# Patient Record
Sex: FEMALE | Race: BLACK OR AFRICAN AMERICAN | Employment: UNEMPLOYED | URBAN - METROPOLITAN AREA
[De-identification: names, ages, dates, MRNs, and addresses within clinical notes are randomized per-mention and may not be internally consistent; named-entity substitution may affect disease eponyms.]

---

## 2017-02-20 ENCOUNTER — OFFICE VISIT (OUTPATIENT)
Dept: OBGYN CLINIC | Facility: CLINIC | Age: 37
End: 2017-02-20

## 2017-02-20 VITALS
WEIGHT: 125 LBS | HEART RATE: 74 BPM | SYSTOLIC BLOOD PRESSURE: 110 MMHG | HEIGHT: 61.5 IN | DIASTOLIC BLOOD PRESSURE: 68 MMHG | BODY MASS INDEX: 23.3 KG/M2

## 2017-02-20 DIAGNOSIS — Z01.419 ENCOUNTER FOR GYNECOLOGICAL EXAMINATION WITHOUT ABNORMAL FINDING: Primary | ICD-10-CM

## 2017-02-20 DIAGNOSIS — Z12.39 BREAST CANCER SCREENING: ICD-10-CM

## 2017-02-20 DIAGNOSIS — Z12.4 CERVICAL CANCER SCREENING: ICD-10-CM

## 2017-02-20 PROCEDURE — 99385 PREV VISIT NEW AGE 18-39: CPT | Performed by: OBSTETRICS & GYNECOLOGY

## 2017-02-20 PROCEDURE — 87624 HPV HI-RISK TYP POOLED RSLT: CPT | Performed by: OBSTETRICS & GYNECOLOGY

## 2017-02-20 PROCEDURE — 88175 CYTOPATH C/V AUTO FLUID REDO: CPT | Performed by: OBSTETRICS & GYNECOLOGY

## 2017-02-20 NOTE — PROGRESS NOTES
GYN H&P     2017  2:29 PM    CC: Patient presents with:  Physical: irregular menses since stopping pill/ would like pregnancy      HPI: patient is a 43year old  here for her annual gyne exam. Pt is new to our office. Moved from [place].  [facility] wheezing. Cardiovascular: Negative for chest pain, palpitations and leg swelling. Gastrointestinal: Negative for nausea, vomiting, abdominal pain, diarrhea, blood in stool   Genitourinary: Negative for dysuria, hematuria and difficulty urinating.    mAy Ramírez Ekta MARIE

## 2017-02-21 LAB — HPV I/H RISK 1 DNA SPEC QL NAA+PROBE: NEGATIVE

## 2017-06-08 ENCOUNTER — OFFICE VISIT (OUTPATIENT)
Dept: OBGYN CLINIC | Facility: CLINIC | Age: 37
End: 2017-06-08

## 2017-06-08 VITALS
WEIGHT: 129 LBS | HEIGHT: 62.5 IN | HEART RATE: 92 BPM | DIASTOLIC BLOOD PRESSURE: 80 MMHG | SYSTOLIC BLOOD PRESSURE: 124 MMHG | BODY MASS INDEX: 23.14 KG/M2

## 2017-06-08 DIAGNOSIS — O09.521 AMA (ADVANCED MATERNAL AGE) MULTIGRAVIDA 35+, FIRST TRIMESTER: Primary | ICD-10-CM

## 2017-06-08 PROBLEM — O09.529 AMA (ADVANCED MATERNAL AGE) MULTIGRAVIDA 35+: Status: ACTIVE | Noted: 2017-06-08

## 2017-06-08 PROBLEM — O09.529 AMA (ADVANCED MATERNAL AGE) MULTIGRAVIDA 35+ (HCC): Status: ACTIVE | Noted: 2017-06-08

## 2017-06-08 PROCEDURE — 87086 URINE CULTURE/COLONY COUNT: CPT | Performed by: OBSTETRICS & GYNECOLOGY

## 2017-06-08 RX ORDER — PRENATAL VIT/IRON FUM/FOLIC AC 27MG-0.8MG
1 TABLET ORAL DAILY
COMMUNITY
End: 2018-08-01 | Stop reason: ALTCHOICE

## 2017-06-08 NOTE — PROGRESS NOTES
NOB  Stopped OC in December, irregular menses   x1  PMH: diverticulitis  PSH: none  Normal PAP/HPV , declined [facility]  Scan: SIUP, 7 w,   Cardiac activity seen  Prenatal care and course discussed with patient including ultrasounds, genetic testing opt

## 2017-06-21 ENCOUNTER — LAB ENCOUNTER (OUTPATIENT)
Dept: LAB | Age: 37
End: 2017-06-21
Attending: OBSTETRICS & GYNECOLOGY
Payer: COMMERCIAL

## 2017-06-21 DIAGNOSIS — O09.521 AMA (ADVANCED MATERNAL AGE) MULTIGRAVIDA 35+, FIRST TRIMESTER: ICD-10-CM

## 2017-06-21 PROCEDURE — 86901 BLOOD TYPING SEROLOGIC RH(D): CPT | Performed by: OBSTETRICS & GYNECOLOGY

## 2017-06-21 PROCEDURE — 86850 RBC ANTIBODY SCREEN: CPT | Performed by: OBSTETRICS & GYNECOLOGY

## 2017-06-21 PROCEDURE — 36415 COLL VENOUS BLD VENIPUNCTURE: CPT | Performed by: OBSTETRICS & GYNECOLOGY

## 2017-06-21 PROCEDURE — 87340 HEPATITIS B SURFACE AG IA: CPT | Performed by: OBSTETRICS & GYNECOLOGY

## 2017-06-21 PROCEDURE — 86762 RUBELLA ANTIBODY: CPT | Performed by: OBSTETRICS & GYNECOLOGY

## 2017-06-21 PROCEDURE — 85025 COMPLETE CBC W/AUTO DIFF WBC: CPT | Performed by: OBSTETRICS & GYNECOLOGY

## 2017-06-21 PROCEDURE — 86780 TREPONEMA PALLIDUM: CPT | Performed by: OBSTETRICS & GYNECOLOGY

## 2017-06-21 PROCEDURE — 87389 HIV-1 AG W/HIV-1&-2 AB AG IA: CPT | Performed by: OBSTETRICS & GYNECOLOGY

## 2017-06-21 PROCEDURE — 86900 BLOOD TYPING SEROLOGIC ABO: CPT | Performed by: OBSTETRICS & GYNECOLOGY

## 2017-06-28 ENCOUNTER — TELEPHONE (OUTPATIENT)
Dept: OBGYN CLINIC | Facility: CLINIC | Age: 37
End: 2017-06-28

## 2017-07-06 ENCOUNTER — OFFICE VISIT (OUTPATIENT)
Dept: OBGYN CLINIC | Facility: CLINIC | Age: 37
End: 2017-07-06

## 2017-07-06 VITALS
WEIGHT: 133 LBS | SYSTOLIC BLOOD PRESSURE: 112 MMHG | HEIGHT: 62.5 IN | BODY MASS INDEX: 23.86 KG/M2 | DIASTOLIC BLOOD PRESSURE: 66 MMHG

## 2017-07-06 DIAGNOSIS — O09.91 HIGH-RISK PREGNANCY, FIRST TRIMESTER: Primary | ICD-10-CM

## 2017-07-06 DIAGNOSIS — O09.521 AMA (ADVANCED MATERNAL AGE) MULTIGRAVIDA 35+, FIRST TRIMESTER: ICD-10-CM

## 2017-07-12 ENCOUNTER — MED REC SCAN ONLY (OUTPATIENT)
Dept: OBGYN CLINIC | Facility: CLINIC | Age: 37
End: 2017-07-12

## 2017-07-12 ENCOUNTER — TELEPHONE (OUTPATIENT)
Dept: OBGYN CLINIC | Facility: CLINIC | Age: 37
End: 2017-07-12

## 2017-07-31 ENCOUNTER — OFFICE VISIT (OUTPATIENT)
Dept: OBGYN CLINIC | Facility: CLINIC | Age: 37
End: 2017-07-31

## 2017-07-31 VITALS
BODY MASS INDEX: 24.4 KG/M2 | WEIGHT: 136 LBS | DIASTOLIC BLOOD PRESSURE: 56 MMHG | HEIGHT: 62.5 IN | SYSTOLIC BLOOD PRESSURE: 104 MMHG

## 2017-07-31 DIAGNOSIS — O09.92 HIGH-RISK PREGNANCY, SECOND TRIMESTER: ICD-10-CM

## 2017-07-31 DIAGNOSIS — Z3A.15 15 WEEKS GESTATION OF PREGNANCY: Primary | ICD-10-CM

## 2017-07-31 DIAGNOSIS — O09.522 AMA (ADVANCED MATERNAL AGE) MULTIGRAVIDA 35+, SECOND TRIMESTER: ICD-10-CM

## 2017-07-31 NOTE — PATIENT INSTRUCTIONS
Medications Safe in Pregnancy  The following over-the-counter medications may be taken safely after 12 weeks gestation by any patient who is pregnant. Please follow the instructions on the package for adult dosage.   If you experience any symptoms benjamín

## 2017-07-31 NOTE — PROGRESS NOTES
ROSETTE  Doing well  No complaints.  No LOF/VB/uctx  RH +  Genetic testing low risk cf dna (first, quad/AFP)  Anatomy Scan to have level 2--order entered (18-20weeks)  rtc 4 wk

## 2017-08-29 ENCOUNTER — OFFICE VISIT (OUTPATIENT)
Dept: OBGYN CLINIC | Facility: CLINIC | Age: 37
End: 2017-08-29

## 2017-08-29 VITALS
WEIGHT: 140 LBS | SYSTOLIC BLOOD PRESSURE: 112 MMHG | DIASTOLIC BLOOD PRESSURE: 68 MMHG | HEIGHT: 62.5 IN | BODY MASS INDEX: 25.12 KG/M2

## 2017-08-29 DIAGNOSIS — O09.92 HIGH-RISK PREGNANCY, SECOND TRIMESTER: ICD-10-CM

## 2017-08-29 DIAGNOSIS — O09.522 AMA (ADVANCED MATERNAL AGE) MULTIGRAVIDA 35+, SECOND TRIMESTER: Primary | ICD-10-CM

## 2017-09-06 NOTE — PROGRESS NOTES
Outpatient Maternal-Fetal Medicine Consultation    Dear Dr. Michael Torres    Thank you for requesting ultrasound evaluation and maternal fetal medicine consultation on your patient Cayden Subramanian.   As you are aware she is a 43year old female  with a ____________________________________________________________________________  Dating:  LMP: 04/17/2017 EDC: 01/22/2018 GA by LMP: 20w3d  Current Scan on: 09/07/2017 EDC: 01/26/2018 GA by current scan: 19w6d  The calculation of the gestational age by curr mm.  ____________________________________________________________________________    I interpreted the results and reviewed them with the patient.     DISCUSSION  During her visit we discussed and reviewed the following issues:  ADVANCED MATERNAL AGE    Chandu Hernandez non-invasive pregnancy testing (NIPT) offers the highest detection rate (with the lowest false positive rate) for the detection of fetal aneuploidy amongst high-risk patients.   The limitations of detailed mid-trimester sonography was reviewed with the zander

## 2017-09-07 ENCOUNTER — OFFICE VISIT (OUTPATIENT)
Dept: PERINATAL CARE | Facility: HOSPITAL | Age: 37
End: 2017-09-07
Attending: OBSTETRICS & GYNECOLOGY
Payer: COMMERCIAL

## 2017-09-07 VITALS
HEART RATE: 100 BPM | BODY MASS INDEX: 25.76 KG/M2 | DIASTOLIC BLOOD PRESSURE: 44 MMHG | WEIGHT: 140 LBS | HEIGHT: 62 IN | SYSTOLIC BLOOD PRESSURE: 107 MMHG

## 2017-09-07 DIAGNOSIS — O09.522 ELDERLY MULTIGRAVIDA IN SECOND TRIMESTER: Primary | ICD-10-CM

## 2017-09-07 PROCEDURE — 99243 OFF/OP CNSLTJ NEW/EST LOW 30: CPT

## 2017-09-07 PROCEDURE — 76811 OB US DETAILED SNGL FETUS: CPT

## 2017-09-11 ENCOUNTER — TELEPHONE (OUTPATIENT)
Dept: OBGYN CLINIC | Facility: CLINIC | Age: 37
End: 2017-09-11

## 2017-09-11 NOTE — TELEPHONE ENCOUNTER
Returned patient's call. She reports having a cold with congestion; sore throat has resolved. Has some pain that started today on the right side of her face. Reviewed medications and congestion relief methods safe in pregnancy.  Questions answered and patie

## 2017-09-14 ENCOUNTER — HOSPITAL ENCOUNTER (OUTPATIENT)
Age: 37
Discharge: HOME | End: 2017-09-14
Attending: FAMILY MEDICINE
Payer: COMMERCIAL

## 2017-09-14 VITALS
HEIGHT: 61 IN | BODY MASS INDEX: 26.43 KG/M2 | TEMPERATURE: 99 F | DIASTOLIC BLOOD PRESSURE: 72 MMHG | RESPIRATION RATE: 18 BRPM | WEIGHT: 140 LBS | SYSTOLIC BLOOD PRESSURE: 127 MMHG | HEART RATE: 112 BPM | OXYGEN SATURATION: 100 %

## 2017-09-14 DIAGNOSIS — J06.9 VIRAL UPPER RESPIRATORY TRACT INFECTION: Primary | ICD-10-CM

## 2017-09-14 PROCEDURE — 99202 OFFICE O/P NEW SF 15 MIN: CPT

## 2017-09-14 PROCEDURE — 99212 OFFICE O/P EST SF 10 MIN: CPT

## 2017-09-14 NOTE — TELEPHONE ENCOUNTER
Pt stating she now is coughing up dark green mucous  I asked if pt followed up with PCP as directed by nurse on 9/11, pt stating she doesn't have a PCP as she just moved to area.   Pt requesting nurse call back to discuss her options

## 2017-09-14 NOTE — ED INITIAL ASSESSMENT (HPI)
Pt c/o cough x 1 week, productive with green Phlegm today, nasal congestion and runny nose. Pt denies sore throat, fever or chills. Denies any SOB when asked but states she does get a little SOB after coughing fits. Pt is 5 months gestation .   Per pt,

## 2017-09-14 NOTE — TELEPHONE ENCOUNTER
Pt reports continued respiratory congestion; productive cough; green sputum; denies fever or chest pain. Pt does not have PCP. Advised pt to go to walk in care. Patient verbalized understanding.

## 2017-09-14 NOTE — ED PROVIDER NOTES
Patient Seen in: [facility]    History   Patient presents with:  Cough/URI    Stated Complaint: COUGH, CONGESTION, SOB x1 week    HPI    Patient is a 58-year-old female.   Coming in today with complaint of cough, congestion noted in HPI. Constitutional and vital signs reviewed. All other systems reviewed and negative except as noted above. PSFH elements reviewed from today and agreed except as otherwise stated in HPI.     Physical Exam   ED Triage Vitals [09/14/17 144 as needed for control cold symptoms. Symptomatic treatment at this time. If she starts running fevers, or if no symptom improvement, she may return for reevaluation.       Disposition and Plan     Clinical Impression:  Viral upper respiratory tract infect

## 2017-09-26 ENCOUNTER — OFFICE VISIT (OUTPATIENT)
Dept: OBGYN CLINIC | Facility: CLINIC | Age: 37
End: 2017-09-26

## 2017-09-26 VITALS
BODY MASS INDEX: 25.76 KG/M2 | HEIGHT: 62 IN | DIASTOLIC BLOOD PRESSURE: 60 MMHG | SYSTOLIC BLOOD PRESSURE: 106 MMHG | WEIGHT: 140 LBS

## 2017-09-26 DIAGNOSIS — O09.522 AMA (ADVANCED MATERNAL AGE) MULTIGRAVIDA 35+, SECOND TRIMESTER: ICD-10-CM

## 2017-09-26 DIAGNOSIS — O09.92 HIGH-RISK PREGNANCY, SECOND TRIMESTER: Primary | ICD-10-CM

## 2017-09-30 ENCOUNTER — LAB ENCOUNTER (OUTPATIENT)
Dept: LAB | Age: 37
End: 2017-09-30
Attending: OBSTETRICS & GYNECOLOGY
Payer: COMMERCIAL

## 2017-09-30 DIAGNOSIS — O09.92 HIGH-RISK PREGNANCY, SECOND TRIMESTER: ICD-10-CM

## 2017-09-30 PROCEDURE — 36415 COLL VENOUS BLD VENIPUNCTURE: CPT

## 2017-09-30 PROCEDURE — 85025 COMPLETE CBC W/AUTO DIFF WBC: CPT

## 2017-09-30 PROCEDURE — 82950 GLUCOSE TEST: CPT

## 2017-10-02 NOTE — PROGRESS NOTES
Contacted patient and reported results. Instructed patient on iron supplement. She states understanding and has no questions at this time.

## 2017-10-26 ENCOUNTER — OFFICE VISIT (OUTPATIENT)
Dept: OBGYN CLINIC | Facility: CLINIC | Age: 37
End: 2017-10-26

## 2017-10-26 VITALS
HEIGHT: 62 IN | DIASTOLIC BLOOD PRESSURE: 70 MMHG | SYSTOLIC BLOOD PRESSURE: 100 MMHG | WEIGHT: 143 LBS | BODY MASS INDEX: 26.31 KG/M2

## 2017-10-26 DIAGNOSIS — O09.522 AMA (ADVANCED MATERNAL AGE) MULTIGRAVIDA 35+, SECOND TRIMESTER: Primary | ICD-10-CM

## 2017-10-26 NOTE — PROGRESS NOTES
ROSETTE  Doing well  Good FM, discussed fetal movement  RH positive  1 hr glucose/ CB normal  TDAP recommended during pregnancy and instructions given  RTC 2 weeks

## 2017-11-15 ENCOUNTER — OFFICE VISIT (OUTPATIENT)
Dept: OBGYN CLINIC | Facility: CLINIC | Age: 37
End: 2017-11-15

## 2017-11-15 VITALS
SYSTOLIC BLOOD PRESSURE: 104 MMHG | HEIGHT: 62 IN | BODY MASS INDEX: 26.68 KG/M2 | WEIGHT: 145 LBS | DIASTOLIC BLOOD PRESSURE: 54 MMHG

## 2017-11-15 DIAGNOSIS — O09.523 AMA (ADVANCED MATERNAL AGE) MULTIGRAVIDA 35+, THIRD TRIMESTER: Primary | ICD-10-CM

## 2017-11-15 DIAGNOSIS — Z23 NEED FOR VACCINATION: ICD-10-CM

## 2017-11-15 DIAGNOSIS — Z34.83 PRENATAL CARE, SUBSEQUENT PREGNANCY, THIRD TRIMESTER: ICD-10-CM

## 2017-11-15 PROCEDURE — 90686 IIV4 VACC NO PRSV 0.5 ML IM: CPT | Performed by: OBSTETRICS & GYNECOLOGY

## 2017-11-15 PROCEDURE — 90471 IMMUNIZATION ADMIN: CPT | Performed by: OBSTETRICS & GYNECOLOGY

## 2017-11-15 PROCEDURE — 90715 TDAP VACCINE 7 YRS/> IM: CPT | Performed by: OBSTETRICS & GYNECOLOGY

## 2017-11-15 PROCEDURE — 90472 IMMUNIZATION ADMIN EACH ADD: CPT | Performed by: OBSTETRICS & GYNECOLOGY

## 2017-11-15 NOTE — PROGRESS NOTES
ROSETTE  Doing well, +FM  Denies VB/LOF/uctx  Rh pos, TDAP and flu today  Growth US at 32 wks  NST at 36 wks for AMA  RTC in 2 wks  Fetal movement instructions given

## 2017-11-29 NOTE — PROGRESS NOTES
Keke Swenson    Dear Dr. Don Cuevas    Thank you for requesting ultrasound evaluation and maternal fetal medicine consultation on your patient Perez Pineda.   As you are aware she is a 43year old female  with bladder. Gastrointestinal Tract: stomach visible. Summary of Ultrasound Findings:  Impression:  The fetal growth is appropriate    ____________________________________________________________________________  Fetal Wellbeing Assessment:  Amniotic flui

## 2017-11-30 ENCOUNTER — OFFICE VISIT (OUTPATIENT)
Dept: OBGYN CLINIC | Facility: CLINIC | Age: 37
End: 2017-11-30

## 2017-11-30 ENCOUNTER — OFFICE VISIT (OUTPATIENT)
Dept: PERINATAL CARE | Facility: HOSPITAL | Age: 37
End: 2017-11-30
Attending: OBSTETRICS & GYNECOLOGY
Payer: COMMERCIAL

## 2017-11-30 VITALS — BODY MASS INDEX: 26 KG/M2 | SYSTOLIC BLOOD PRESSURE: 110 MMHG | WEIGHT: 144 LBS | DIASTOLIC BLOOD PRESSURE: 58 MMHG

## 2017-11-30 VITALS
WEIGHT: 144 LBS | HEART RATE: 108 BPM | SYSTOLIC BLOOD PRESSURE: 106 MMHG | DIASTOLIC BLOOD PRESSURE: 64 MMHG | BODY MASS INDEX: 26 KG/M2

## 2017-11-30 DIAGNOSIS — O09.523 ELDERLY MULTIGRAVIDA IN THIRD TRIMESTER: ICD-10-CM

## 2017-11-30 DIAGNOSIS — O09.523 AMA (ADVANCED MATERNAL AGE) MULTIGRAVIDA 35+, THIRD TRIMESTER: Primary | ICD-10-CM

## 2017-11-30 DIAGNOSIS — Z34.83 PRENATAL CARE, SUBSEQUENT PREGNANCY, THIRD TRIMESTER: ICD-10-CM

## 2017-11-30 PROCEDURE — 99213 OFFICE O/P EST LOW 20 MIN: CPT | Performed by: OBSTETRICS & GYNECOLOGY

## 2017-11-30 PROCEDURE — 76819 FETAL BIOPHYS PROFIL W/O NST: CPT | Performed by: OBSTETRICS & GYNECOLOGY

## 2017-11-30 PROCEDURE — 76805 OB US >/= 14 WKS SNGL FETUS: CPT | Performed by: OBSTETRICS & GYNECOLOGY

## 2017-11-30 NOTE — PROGRESS NOTES
ROSETTE  Doing well, +FM  Denies VB/LOF/uctx  Rh pos, TDAP received  RTC in 2 wks  Fetal movement instructions given  Growth US w/ MFM today  Thinking about PP tubal ligation, discussed at 6 weeks to be performed if she desires

## 2017-12-14 ENCOUNTER — OFFICE VISIT (OUTPATIENT)
Dept: OBGYN CLINIC | Facility: CLINIC | Age: 37
End: 2017-12-14

## 2017-12-14 VITALS
HEIGHT: 62 IN | BODY MASS INDEX: 27.67 KG/M2 | DIASTOLIC BLOOD PRESSURE: 60 MMHG | SYSTOLIC BLOOD PRESSURE: 100 MMHG | WEIGHT: 150.38 LBS

## 2017-12-14 DIAGNOSIS — O09.523 ELDERLY MULTIGRAVIDA IN THIRD TRIMESTER: Primary | ICD-10-CM

## 2017-12-14 DIAGNOSIS — Z34.93 ENCOUNTER FOR SUPERVISION OF NORMAL PREGNANCY IN THIRD TRIMESTER, UNSPECIFIED GRAVIDITY: ICD-10-CM

## 2017-12-14 NOTE — PROGRESS NOTES
ROSETTE  Doing well, +FM  Denies LOF/VB/uctx  Rh positive, TDAP and flu vaccine received  AMA: cffDNA low risk, s/p MFM Level 2 anatomy scan and growth scan with unremarkable findings, NST weekly at 36 wks     RTC in 1 wks    PTL and Fetal movement instruction

## 2017-12-22 ENCOUNTER — OFFICE VISIT (OUTPATIENT)
Dept: OBGYN CLINIC | Facility: CLINIC | Age: 37
End: 2017-12-22

## 2017-12-22 VITALS
HEIGHT: 62 IN | SYSTOLIC BLOOD PRESSURE: 110 MMHG | BODY MASS INDEX: 27.42 KG/M2 | WEIGHT: 149 LBS | DIASTOLIC BLOOD PRESSURE: 68 MMHG

## 2017-12-22 DIAGNOSIS — O09.523 ELDERLY MULTIGRAVIDA IN THIRD TRIMESTER: Primary | ICD-10-CM

## 2017-12-22 PROCEDURE — 87653 STREP B DNA AMP PROBE: CPT | Performed by: OBSTETRICS & GYNECOLOGY

## 2017-12-22 PROCEDURE — 87081 CULTURE SCREEN ONLY: CPT | Performed by: OBSTETRICS & GYNECOLOGY

## 2017-12-22 NOTE — PROGRESS NOTES
ROSETTE  Doing well, +FM  Denies VB/LOF/uctx  Mode of delivery:   anticipated  SVE cl/th/hi   GBS collected (35-37)  RTC 1 week  NST next week

## 2017-12-29 ENCOUNTER — OFFICE VISIT (OUTPATIENT)
Dept: OBGYN CLINIC | Facility: CLINIC | Age: 37
End: 2017-12-29

## 2017-12-29 ENCOUNTER — APPOINTMENT (OUTPATIENT)
Dept: OBGYN CLINIC | Facility: CLINIC | Age: 37
End: 2017-12-29

## 2017-12-29 VITALS — BODY MASS INDEX: 27 KG/M2 | DIASTOLIC BLOOD PRESSURE: 64 MMHG | SYSTOLIC BLOOD PRESSURE: 104 MMHG | WEIGHT: 150 LBS

## 2017-12-29 DIAGNOSIS — Z34.83 PRENATAL CARE, SUBSEQUENT PREGNANCY, THIRD TRIMESTER: ICD-10-CM

## 2017-12-29 DIAGNOSIS — Z3A.36 36 WEEKS GESTATION OF PREGNANCY: Primary | ICD-10-CM

## 2017-12-29 DIAGNOSIS — O09.523 ELDERLY MULTIGRAVIDA IN THIRD TRIMESTER: ICD-10-CM

## 2017-12-29 PROCEDURE — 59025 FETAL NON-STRESS TEST: CPT | Performed by: OBSTETRICS & GYNECOLOGY

## 2017-12-29 NOTE — PROGRESS NOTES
ROSETTE  Doing well, +FM  Denies VB/LOF/uctx  Mode of delivery:   anticipated  SVE cl/80/-1   GBS collected (35-37)POS  RTC 1 week  NST reactive

## 2018-01-04 ENCOUNTER — OFFICE VISIT (OUTPATIENT)
Dept: OBGYN CLINIC | Facility: CLINIC | Age: 38
End: 2018-01-04

## 2018-01-04 ENCOUNTER — APPOINTMENT (OUTPATIENT)
Dept: OBGYN CLINIC | Facility: CLINIC | Age: 38
End: 2018-01-04

## 2018-01-04 VITALS
HEIGHT: 62 IN | BODY MASS INDEX: 27.79 KG/M2 | DIASTOLIC BLOOD PRESSURE: 64 MMHG | SYSTOLIC BLOOD PRESSURE: 100 MMHG | WEIGHT: 151 LBS

## 2018-01-04 DIAGNOSIS — O09.523 AMA (ADVANCED MATERNAL AGE) MULTIGRAVIDA 35+, THIRD TRIMESTER: Primary | ICD-10-CM

## 2018-01-04 PROCEDURE — 59025 FETAL NON-STRESS TEST: CPT | Performed by: OBSTETRICS & GYNECOLOGY

## 2018-01-04 NOTE — PROGRESS NOTES
ROSETTE  Doing well, +FM  Denies VB/LOF/uctx  Mode of delivery:   anticipated.  IOL 17 at 715  SVE /-2 vtx   GBS pos, abx in labor  RTC 1 week  NST reactive

## 2018-01-08 ENCOUNTER — HOSPITAL ENCOUNTER (INPATIENT)
Facility: HOSPITAL | Age: 38
LOS: 2 days | Discharge: HOME | End: 2018-01-10
Attending: OBSTETRICS & GYNECOLOGY | Admitting: OBSTETRICS & GYNECOLOGY
Payer: COMMERCIAL

## 2018-01-08 PROBLEM — Z34.90 PREGNANCY (HCC): Status: ACTIVE | Noted: 2018-01-08

## 2018-01-08 PROBLEM — Z34.90 PREGNANCY: Status: ACTIVE | Noted: 2018-01-08

## 2018-01-08 LAB
BILIRUBIN URINE: NEGATIVE
BLOOD URINE: NEGATIVE
CONTROL RUN WITHIN 24 HOURS?: YES
GLUCOSE URINE: NEGATIVE
KETONE URINE: NEGATIVE
NITRITE URINE: NEGATIVE
PH URINE: 7 (ref 5–8)
PROTEIN URINE: NEGATIVE
SPEC GRAVITY: 1.01 (ref 1–1.03)
URINE COLOR: COLORLESS
UROBILINOGEN URINE: 0.2

## 2018-01-08 RX ORDER — AMPICILLIN 1 G/1
1 INJECTION, POWDER, FOR SOLUTION INTRAMUSCULAR; INTRAVENOUS EVERY 4 HOURS
Status: DISCONTINUED | OUTPATIENT
Start: 2018-01-09 | End: 2018-01-09 | Stop reason: HOSPADM

## 2018-01-08 RX ORDER — TERBUTALINE SULFATE 1 MG/ML
0.25 INJECTION, SOLUTION SUBCUTANEOUS AS NEEDED
Status: DISCONTINUED | OUTPATIENT
Start: 2018-01-08 | End: 2018-01-09 | Stop reason: HOSPADM

## 2018-01-08 RX ORDER — IBUPROFEN 600 MG/1
600 TABLET ORAL ONCE AS NEEDED
Status: DISCONTINUED | OUTPATIENT
Start: 2018-01-08 | End: 2018-01-09 | Stop reason: HOSPADM

## 2018-01-08 RX ORDER — SODIUM CHLORIDE, SODIUM LACTATE, POTASSIUM CHLORIDE, CALCIUM CHLORIDE 600; 310; 30; 20 MG/100ML; MG/100ML; MG/100ML; MG/100ML
INJECTION, SOLUTION INTRAVENOUS CONTINUOUS
Status: DISCONTINUED | OUTPATIENT
Start: 2018-01-09 | End: 2018-01-09 | Stop reason: HOSPADM

## 2018-01-08 RX ORDER — DEXTROSE, SODIUM CHLORIDE, SODIUM LACTATE, POTASSIUM CHLORIDE, AND CALCIUM CHLORIDE 5; .6; .31; .03; .02 G/100ML; G/100ML; G/100ML; G/100ML; G/100ML
INJECTION, SOLUTION INTRAVENOUS AS NEEDED
Status: DISCONTINUED | OUTPATIENT
Start: 2018-01-08 | End: 2018-01-09 | Stop reason: HOSPADM

## 2018-01-08 RX ORDER — TRISODIUM CITRATE DIHYDRATE AND CITRIC ACID MONOHYDRATE 500; 334 MG/5ML; MG/5ML
30 SOLUTION ORAL AS NEEDED
Status: DISCONTINUED | OUTPATIENT
Start: 2018-01-08 | End: 2018-01-09 | Stop reason: HOSPADM

## 2018-01-08 RX ORDER — MELATONIN
325
COMMUNITY
End: 2018-02-07

## 2018-01-08 RX ORDER — NALBUPHINE HCL 10 MG/ML
2.5 AMPUL (ML) INJECTION
Status: DISCONTINUED | OUTPATIENT
Start: 2018-01-08 | End: 2018-01-10

## 2018-01-08 RX ORDER — EPHEDRINE SULFATE 50 MG/ML
5 INJECTION, SOLUTION INTRAVENOUS AS NEEDED
Status: DISCONTINUED | OUTPATIENT
Start: 2018-01-08 | End: 2018-01-09

## 2018-01-09 LAB
ANTIBODY SCREEN: NEGATIVE
ERYTHROCYTE [DISTWIDTH] IN BLOOD BY AUTOMATED COUNT: 14.5 % (ref 11.5–16)
HCT VFR BLD AUTO: 32.7 % (ref 34–50)
HGB BLD-MCNC: 11.1 G/DL (ref 12–16)
MCH RBC QN AUTO: 30 PG (ref 27–33.2)
MCHC RBC AUTO-ENTMCNC: 33.9 G/DL (ref 31–37)
MCV RBC AUTO: 88.4 FL (ref 81–100)
PLATELET # BLD AUTO: 164 10(3)UL (ref 150–450)
RAPID HIV: NONREACTIVE
RBC # BLD AUTO: 3.7 X10(6)UL (ref 3.8–5.1)
RED CELL DISTRIBUTION WIDTH-SD: 46.5 FL (ref 35.1–46.3)
RH BLOOD TYPE: POSITIVE
T PALLIDUM AB SER QL IA: NONREACTIVE
WBC # BLD AUTO: 9.7 X10(3) UL (ref 4–13)

## 2018-01-09 PROCEDURE — 59400 OBSTETRICAL CARE: CPT | Performed by: OBSTETRICS & GYNECOLOGY

## 2018-01-09 RX ORDER — SIMETHICONE 80 MG
80 TABLET,CHEWABLE ORAL 3 TIMES DAILY PRN
Status: DISCONTINUED | OUTPATIENT
Start: 2018-01-09 | End: 2018-01-10

## 2018-01-09 RX ORDER — DOCUSATE SODIUM 100 MG/1
100 CAPSULE, LIQUID FILLED ORAL
Status: DISCONTINUED | OUTPATIENT
Start: 2018-01-09 | End: 2018-01-10

## 2018-01-09 RX ORDER — HYDROCODONE BITARTRATE AND ACETAMINOPHEN 5; 325 MG/1; MG/1
2 TABLET ORAL EVERY 4 HOURS PRN
Status: DISCONTINUED | OUTPATIENT
Start: 2018-01-09 | End: 2018-01-10

## 2018-01-09 RX ORDER — ACETAMINOPHEN 325 MG/1
650 TABLET ORAL EVERY 4 HOURS PRN
Status: DISCONTINUED | OUTPATIENT
Start: 2018-01-09 | End: 2018-01-10

## 2018-01-09 RX ORDER — IBUPROFEN 600 MG/1
600 TABLET ORAL EVERY 6 HOURS
Status: DISCONTINUED | OUTPATIENT
Start: 2018-01-09 | End: 2018-01-10

## 2018-01-09 RX ORDER — HYDROCODONE BITARTRATE AND ACETAMINOPHEN 5; 325 MG/1; MG/1
1 TABLET ORAL EVERY 4 HOURS PRN
Status: DISCONTINUED | OUTPATIENT
Start: 2018-01-09 | End: 2018-01-10

## 2018-01-09 RX ORDER — BISACODYL 10 MG
10 SUPPOSITORY, RECTAL RECTAL ONCE AS NEEDED
Status: ACTIVE | OUTPATIENT
Start: 2018-01-09 | End: 2018-01-09

## 2018-01-09 RX ORDER — ZOLPIDEM TARTRATE 5 MG/1
5 TABLET ORAL NIGHTLY PRN
Status: DISCONTINUED | OUTPATIENT
Start: 2018-01-09 | End: 2018-01-10

## 2018-01-09 NOTE — PROGRESS NOTES
Report received from [facility]. Patient received in stable condition.  Will continue to monitor

## 2018-01-09 NOTE — L&D DELIVERY NOTE
Letitia Dozier, Girl  [[id number]]     Labor Events     labor?:  No    steroids?:  None   Antibiotics received during labor?:  Yes   Antibiotics (enter # doses in comment):  ampicillin   Rupture date:  18   Rupture time:  711   Rupture type: Scoring Key:     0 1 2    Skin color Blue or pale Acrocyanotic Completely pink    Heart rate Absent <100 bpm >100 bpm    Reflex irritability No response Grimace Cry or active withdrawal    Muscle tone Limp Some flexion Active motion    Respiratory effort A sampled and/or banked. IV pitocin and gentle uterine massage helped delivery of the placenta. Umbilical cord gases were not sent. Placenta delivered spontaneosly by uterine massage.    Rectal-vaginal exam was normal.     Bleeding was minimal.  The patient

## 2018-01-09 NOTE — PROGRESS NOTES
Pt  EDC 18 presents to L&D with C/O uc's every 5mins since apx . Pt denies vag bleeding or leaking of fluid. Pt states + fetal movement. Urine spec obtained, EFM tested and applied.  ID bands verified per pt and this RN

## 2018-01-09 NOTE — H&P
Lavell Storm Patient Status:  Inpatient    1980 MRN [id number]   Location [facility] Attending Patti Samuels MD   Hosp Day # 1 PCP None Pcp     Subjective:  Leigh Fried gbbs    Assessment/Plan:    IUP at 38 1/7 weeks  Labor, admitted, had cervical change, then had some spacing of contractions.   She was offered dc to home, requested to stay  Augmentation started         Risks, benefits, alternatives and possible complicati

## 2018-01-09 NOTE — PROGRESS NOTES
Pt transferred via ambulation. POC discussed. Consents explained, pt verb understanding and agreeable to POC,consents signed. Report given to Pavan Montgomery.

## 2018-01-10 VITALS
DIASTOLIC BLOOD PRESSURE: 66 MMHG | OXYGEN SATURATION: 93 % | TEMPERATURE: 98 F | RESPIRATION RATE: 17 BRPM | SYSTOLIC BLOOD PRESSURE: 123 MMHG | BODY MASS INDEX: 28.51 KG/M2 | WEIGHT: 151 LBS | HEART RATE: 75 BPM | HEIGHT: 61 IN

## 2018-01-10 PROBLEM — Z34.90 PREGNANCY: Status: RESOLVED | Noted: 2018-01-08 | Resolved: 2018-01-10

## 2018-01-10 PROBLEM — Z34.90 PREGNANCY (HCC): Status: RESOLVED | Noted: 2018-01-08 | Resolved: 2018-01-10

## 2018-01-10 LAB
BASOPHILS # BLD AUTO: 0.06 X10(3) UL (ref 0–0.1)
BASOPHILS NFR BLD AUTO: 0.5 %
EOSINOPHIL # BLD AUTO: 0.17 X10(3) UL (ref 0–0.3)
EOSINOPHIL NFR BLD AUTO: 1.5 %
ERYTHROCYTE [DISTWIDTH] IN BLOOD BY AUTOMATED COUNT: 14.6 % (ref 11.5–16)
HCT VFR BLD AUTO: 32.4 % (ref 34–50)
HGB BLD-MCNC: 10.8 G/DL (ref 12–16)
IMMATURE GRANULOCYTE COUNT: 0.05 X10(3) UL (ref 0–1)
IMMATURE GRANULOCYTE RATIO %: 0.4 %
LYMPHOCYTES # BLD AUTO: 2.16 X10(3) UL (ref 0.9–4)
LYMPHOCYTES NFR BLD AUTO: 18.8 %
MCH RBC QN AUTO: 29.5 PG (ref 27–33.2)
MCHC RBC AUTO-ENTMCNC: 33.3 G/DL (ref 31–37)
MCV RBC AUTO: 88.5 FL (ref 81–100)
MONOCYTES # BLD AUTO: 0.72 X10(3) UL (ref 0.1–0.6)
MONOCYTES NFR BLD AUTO: 6.3 %
NEUTROPHIL ABS PRELIM: 8.31 X10 (3) UL (ref 1.3–6.7)
NEUTROPHILS # BLD AUTO: 8.31 X10(3) UL (ref 1.3–6.7)
NEUTROPHILS NFR BLD AUTO: 72.5 %
PLATELET # BLD AUTO: 145 10(3)UL (ref 150–450)
RBC # BLD AUTO: 3.66 X10(6)UL (ref 3.8–5.1)
RED CELL DISTRIBUTION WIDTH-SD: 47.2 FL (ref 35.1–46.3)
WBC # BLD AUTO: 11.5 X10(3) UL (ref 4–13)

## 2018-01-10 NOTE — PROGRESS NOTES
PPD#1  No C/O  Afebrile, VS stable  Minimal bleeding  Check Hg: pending  Stable, possible home today

## 2018-01-11 NOTE — PROGRESS NOTES
NURSING DISCHARGE NOTE    Discharged Home via Wheelchair. Accompanied by Support staff  Belongings Taken by patient/family. Discharge instructions reviewed, pt states understanding.

## 2018-01-14 ENCOUNTER — TELEPHONE (OUTPATIENT)
Dept: OBGYN UNIT | Facility: HOSPITAL | Age: 38
End: 2018-01-14

## 2018-01-22 ENCOUNTER — TELEPHONE (OUTPATIENT)
Dept: OBGYN CLINIC | Facility: CLINIC | Age: 38
End: 2018-01-22

## 2018-02-07 ENCOUNTER — OFFICE VISIT (OUTPATIENT)
Dept: OBGYN CLINIC | Facility: CLINIC | Age: 38
End: 2018-02-07

## 2018-02-07 VITALS
DIASTOLIC BLOOD PRESSURE: 70 MMHG | HEIGHT: 61 IN | WEIGHT: 126 LBS | BODY MASS INDEX: 23.79 KG/M2 | SYSTOLIC BLOOD PRESSURE: 104 MMHG | HEART RATE: 102 BPM

## 2018-02-07 RX ORDER — ACETAMINOPHEN AND CODEINE PHOSPHATE 120; 12 MG/5ML; MG/5ML
0.35 SOLUTION ORAL DAILY
Qty: 28 TABLET | Refills: 3 | Status: SHIPPED | OUTPATIENT
Start: 2018-02-07 | End: 2018-04-02

## 2018-02-07 NOTE — PROGRESS NOTES
GYNE postpartum note     S: patient is a 42year old yo   female who presents today for post partum visit. She underwent  on 18 . She is doing well, breast feeding. Has no complaints.  Bleeding is minimal now   Denies any depressed mood, anx

## 2018-02-15 ENCOUNTER — TELEPHONE (OUTPATIENT)
Dept: OBGYN CLINIC | Facility: CLINIC | Age: 38
End: 2018-02-15

## 2018-04-02 ENCOUNTER — TELEPHONE (OUTPATIENT)
Dept: OBGYN CLINIC | Facility: CLINIC | Age: 38
End: 2018-04-02

## 2018-04-02 RX ORDER — ACETAMINOPHEN AND CODEINE PHOSPHATE 120; 12 MG/5ML; MG/5ML
0.35 SOLUTION ORAL DAILY
Qty: 3 PACKAGE | Refills: 0 | Status: SHIPPED | OUTPATIENT
Start: 2018-04-02 | End: 2018-06-05

## 2018-04-02 NOTE — TELEPHONE ENCOUNTER
Received fax request from [facility] for 90-day supply rx for Marielos-Be, per pt's insurance. Patient has appt for annual on 5/17/18. 1 90-day supply sent.

## 2018-06-05 ENCOUNTER — APPOINTMENT (OUTPATIENT)
Dept: LAB | Age: 38
End: 2018-06-05
Attending: NURSE PRACTITIONER
Payer: COMMERCIAL

## 2018-06-05 ENCOUNTER — OFFICE VISIT (OUTPATIENT)
Dept: OBGYN CLINIC | Facility: CLINIC | Age: 38
End: 2018-06-05

## 2018-06-05 ENCOUNTER — TELEPHONE (OUTPATIENT)
Dept: OBGYN CLINIC | Facility: CLINIC | Age: 38
End: 2018-06-05

## 2018-06-05 VITALS
WEIGHT: 129 LBS | DIASTOLIC BLOOD PRESSURE: 68 MMHG | BODY MASS INDEX: 23.74 KG/M2 | SYSTOLIC BLOOD PRESSURE: 118 MMHG | HEIGHT: 61.75 IN

## 2018-06-05 DIAGNOSIS — Z30.41 SURVEILLANCE FOR BIRTH CONTROL, ORAL CONTRACEPTIVES: ICD-10-CM

## 2018-06-05 DIAGNOSIS — Z01.419 WELL WOMAN EXAM WITH ROUTINE GYNECOLOGICAL EXAM: Primary | ICD-10-CM

## 2018-06-05 PROBLEM — Z34.93 ENCOUNTER FOR SUPERVISION OF NORMAL PREGNANCY IN THIRD TRIMESTER (HCC): Status: RESOLVED | Noted: 2017-12-14 | Resolved: 2018-06-05

## 2018-06-05 PROBLEM — O09.523 ELDERLY MULTIGRAVIDA IN THIRD TRIMESTER: Status: RESOLVED | Noted: 2017-06-08 | Resolved: 2018-06-05

## 2018-06-05 PROBLEM — O09.523 ELDERLY MULTIGRAVIDA IN THIRD TRIMESTER (HCC): Status: RESOLVED | Noted: 2017-06-08 | Resolved: 2018-06-05

## 2018-06-05 PROBLEM — Z34.93 ENCOUNTER FOR SUPERVISION OF NORMAL PREGNANCY IN THIRD TRIMESTER: Status: RESOLVED | Noted: 2017-12-14 | Resolved: 2018-06-05

## 2018-06-05 PROCEDURE — 99395 PREV VISIT EST AGE 18-39: CPT | Performed by: NURSE PRACTITIONER

## 2018-06-05 RX ORDER — ACETAMINOPHEN AND CODEINE PHOSPHATE 120; 12 MG/5ML; MG/5ML
0.35 SOLUTION ORAL DAILY
Qty: 3 PACKAGE | Refills: 3 | Status: SHIPPED | OUTPATIENT
Start: 2018-06-05 | End: 2018-08-06 | Stop reason: ALTCHOICE

## 2018-06-05 NOTE — PROGRESS NOTES
Here for Routine Annual Exam  No concerns or questions. No menses since childbirth, still nursing. Contraception- taking the mini pill, would like a salpingectomy. Has a family history of ovarian cancer, wants permanent sterilization.   No C/O    ROS:

## 2018-06-06 NOTE — TELEPHONE ENCOUNTER
Patient called, she saw Chas Bautista yesterday and she forgot to ask her for a note for work that she was at the [address] [place] [address] yesterday.   Please fax it to pt - fax #  [phone]

## 2018-06-06 NOTE — TELEPHONE ENCOUNTER
Patient returned call. She needs a note stating that she was seen in the office yesterday. Letter faxed to [phone]    She also would like to proceed with scheduling tubal with Dr. Colleen Mccarthy.  Patient was counseled in February 2018 at her postpartum vi

## 2018-06-06 NOTE — TELEPHONE ENCOUNTER
Patient seen in office by Tylor Scott on 6/5/18 for an annual.     Call to patient to see if she needs note for work as proof that she was seen for her annual or if it is an excuse from work letter that she is requesting. No answer and VM box is full.

## 2018-06-25 NOTE — TELEPHONE ENCOUNTER
Patient cancelled her pre-op appointment and wants to talk to a nurse to discuss her appointment. Wants to know if it will be an exam or just a conversation. Please call her back to discuss.

## 2018-06-26 NOTE — TELEPHONE ENCOUNTER
S/p  18; pp appt 18; annual 18. Pt still nursing; taking Donnydonelson Tobar Be. Pt desires tubal or salpingectomy  Pt had to cancel pre op appt due to  issues.   Pt would like to know if appt with MD is just discussion or exam so she can plan

## 2018-07-17 ENCOUNTER — HOSPITAL ENCOUNTER (EMERGENCY)
Facility: HOSPITAL | Age: 38
Discharge: HOME | End: 2018-07-18
Attending: EMERGENCY MEDICINE | Admitting: SURGERY
Payer: COMMERCIAL

## 2018-07-17 DIAGNOSIS — K81.0 ACUTE CHOLECYSTITIS: Primary | ICD-10-CM

## 2018-07-17 DIAGNOSIS — K81.9 CHOLECYSTITIS: ICD-10-CM

## 2018-07-18 ENCOUNTER — APPOINTMENT (OUTPATIENT)
Dept: GENERAL RADIOLOGY | Facility: HOSPITAL | Age: 38
End: 2018-07-18
Attending: SURGERY
Payer: COMMERCIAL

## 2018-07-18 ENCOUNTER — ANESTHESIA (OUTPATIENT)
Dept: SURGERY | Facility: HOSPITAL | Age: 38
End: 2018-07-18

## 2018-07-18 ENCOUNTER — ANESTHESIA EVENT (OUTPATIENT)
Dept: SURGERY | Facility: HOSPITAL | Age: 38
End: 2018-07-18

## 2018-07-18 ENCOUNTER — APPOINTMENT (OUTPATIENT)
Dept: CT IMAGING | Age: 38
End: 2018-07-18
Attending: EMERGENCY MEDICINE
Payer: COMMERCIAL

## 2018-07-18 VITALS
BODY MASS INDEX: 24.55 KG/M2 | HEIGHT: 61 IN | HEART RATE: 86 BPM | SYSTOLIC BLOOD PRESSURE: 121 MMHG | RESPIRATION RATE: 16 BRPM | OXYGEN SATURATION: 100 % | WEIGHT: 130 LBS | TEMPERATURE: 98 F | DIASTOLIC BLOOD PRESSURE: 92 MMHG

## 2018-07-18 PROBLEM — K81.0 ACUTE CHOLECYSTITIS: Status: ACTIVE | Noted: 2018-07-18

## 2018-07-18 LAB
ALBUMIN SERPL-MCNC: 3.9 G/DL (ref 3.5–4.8)
ALP LIVER SERPL-CCNC: 88 U/L (ref 37–98)
ALT SERPL-CCNC: 30 U/L (ref 14–54)
AST SERPL-CCNC: 36 U/L (ref 15–41)
BASOPHILS # BLD AUTO: 0.08 X10(3) UL (ref 0–0.1)
BASOPHILS NFR BLD AUTO: 1.2 %
BILIRUB SERPL-MCNC: 0.2 MG/DL (ref 0.1–2)
BILIRUB UR QL STRIP.AUTO: NEGATIVE
BUN BLD-MCNC: 24 MG/DL (ref 8–20)
CALCIUM BLD-MCNC: 9.4 MG/DL (ref 8.3–10.3)
CHLORIDE: 107 MMOL/L (ref 101–111)
CLARITY UR REFRACT.AUTO: CLEAR
CO2: 22 MMOL/L (ref 22–32)
COLOR UR AUTO: YELLOW
CREAT BLD-MCNC: 1.14 MG/DL (ref 0.55–1.02)
EOSINOPHIL # BLD AUTO: 0.21 X10(3) UL (ref 0–0.3)
EOSINOPHIL NFR BLD AUTO: 3.1 %
ERYTHROCYTE [DISTWIDTH] IN BLOOD BY AUTOMATED COUNT: 13.3 % (ref 11.5–16)
GLUCOSE BLD-MCNC: 125 MG/DL (ref 70–99)
GLUCOSE UR STRIP.AUTO-MCNC: NEGATIVE MG/DL
HCT VFR BLD AUTO: 38 % (ref 34–50)
HGB BLD-MCNC: 12.8 G/DL (ref 12–16)
IMMATURE GRANULOCYTE COUNT: 0.01 X10(3) UL (ref 0–1)
IMMATURE GRANULOCYTE RATIO %: 0.1 %
KETONES UR STRIP.AUTO-MCNC: NEGATIVE MG/DL
LEUKOCYTE ESTERASE UR QL STRIP.AUTO: NEGATIVE
LIPASE: 273 U/L (ref 73–393)
LYMPHOCYTES # BLD AUTO: 3.08 X10(3) UL (ref 0.9–4)
LYMPHOCYTES NFR BLD AUTO: 45.3 %
M PROTEIN MFR SERPL ELPH: 7.5 G/DL (ref 6.1–8.3)
MCH RBC QN AUTO: 28.7 PG (ref 27–33.2)
MCHC RBC AUTO-ENTMCNC: 33.7 G/DL (ref 31–37)
MCV RBC AUTO: 85.2 FL (ref 81–100)
MONOCYTES # BLD AUTO: 0.66 X10(3) UL (ref 0.1–1)
MONOCYTES NFR BLD AUTO: 9.7 %
NEUTROPHIL ABS PRELIM: 2.76 X10 (3) UL (ref 1.3–6.7)
NEUTROPHILS # BLD AUTO: 2.76 X10(3) UL (ref 1.3–6.7)
NEUTROPHILS NFR BLD AUTO: 40.6 %
NITRITE UR QL STRIP.AUTO: NEGATIVE
PH UR STRIP.AUTO: 8 [PH] (ref 4.5–8)
PLATELET # BLD AUTO: 226 10(3)UL (ref 150–450)
POCT LOT NUMBER: NORMAL
POCT URINE PREGNANCY: NEGATIVE
POTASSIUM SERPL-SCNC: 3.7 MMOL/L (ref 3.6–5.1)
PROT UR STRIP.AUTO-MCNC: NEGATIVE MG/DL
RBC # BLD AUTO: 4.46 X10(6)UL (ref 3.8–5.1)
RBC UR QL AUTO: NEGATIVE
RED CELL DISTRIBUTION WIDTH-SD: 41.6 FL (ref 35.1–46.3)
SODIUM SERPL-SCNC: 138 MMOL/L (ref 136–144)
SP GR UR STRIP.AUTO: 1.01 (ref 1–1.03)
UROBILINOGEN UR STRIP.AUTO-MCNC: 0.2 MG/DL
WBC # BLD AUTO: 6.8 X10(3) UL (ref 4–13)

## 2018-07-18 PROCEDURE — 74300 X-RAY BILE DUCTS/PANCREAS: CPT | Performed by: SURGERY

## 2018-07-18 PROCEDURE — 99243 OFF/OP CNSLTJ NEW/EST LOW 30: CPT | Performed by: SURGERY

## 2018-07-18 PROCEDURE — 0FT44ZZ RESECTION OF GALLBLADDER, PERCUTANEOUS ENDOSCOPIC APPROACH: ICD-10-PCS | Performed by: SURGERY

## 2018-07-18 PROCEDURE — BF10YZZ FLUOROSCOPY OF BILE DUCTS USING OTHER CONTRAST: ICD-10-PCS | Performed by: SURGERY

## 2018-07-18 PROCEDURE — 74177 CT ABD & PELVIS W/CONTRAST: CPT | Performed by: EMERGENCY MEDICINE

## 2018-07-18 PROCEDURE — 47563 LAPARO CHOLECYSTECTOMY/GRAPH: CPT | Performed by: SURGERY

## 2018-07-18 RX ORDER — DIPHENHYDRAMINE HYDROCHLORIDE 50 MG/ML
12.5 INJECTION INTRAMUSCULAR; INTRAVENOUS AS NEEDED
Status: DISCONTINUED | OUTPATIENT
Start: 2018-07-18 | End: 2018-07-18

## 2018-07-18 RX ORDER — HYDROCODONE BITARTRATE AND ACETAMINOPHEN 5; 325 MG/1; MG/1
2 TABLET ORAL AS NEEDED
Status: COMPLETED | OUTPATIENT
Start: 2018-07-18 | End: 2018-07-18

## 2018-07-18 RX ORDER — ONDANSETRON 2 MG/ML
4 INJECTION INTRAMUSCULAR; INTRAVENOUS ONCE
Status: COMPLETED | OUTPATIENT
Start: 2018-07-18 | End: 2018-07-18

## 2018-07-18 RX ORDER — SODIUM CHLORIDE 9 MG/ML
INJECTION, SOLUTION INTRAVENOUS CONTINUOUS
Status: CANCELLED | OUTPATIENT
Start: 2018-07-18 | End: 2018-07-18

## 2018-07-18 RX ORDER — MEPERIDINE HYDROCHLORIDE 25 MG/ML
12.5 INJECTION INTRAMUSCULAR; INTRAVENOUS; SUBCUTANEOUS AS NEEDED
Status: DISCONTINUED | OUTPATIENT
Start: 2018-07-18 | End: 2018-07-18

## 2018-07-18 RX ORDER — HYDROMORPHONE HYDROCHLORIDE 1 MG/ML
1 INJECTION, SOLUTION INTRAMUSCULAR; INTRAVENOUS; SUBCUTANEOUS EVERY 30 MIN PRN
Status: DISCONTINUED | OUTPATIENT
Start: 2018-07-18 | End: 2018-07-18

## 2018-07-18 RX ORDER — HYDROMORPHONE HYDROCHLORIDE 1 MG/ML
0.5 INJECTION, SOLUTION INTRAMUSCULAR; INTRAVENOUS; SUBCUTANEOUS EVERY 30 MIN PRN
Status: CANCELLED | OUTPATIENT
Start: 2018-07-18 | End: 2018-07-18

## 2018-07-18 RX ORDER — SODIUM CHLORIDE, SODIUM LACTATE, POTASSIUM CHLORIDE, CALCIUM CHLORIDE 600; 310; 30; 20 MG/100ML; MG/100ML; MG/100ML; MG/100ML
INJECTION, SOLUTION INTRAVENOUS CONTINUOUS
Status: DISCONTINUED | OUTPATIENT
Start: 2018-07-18 | End: 2018-07-18

## 2018-07-18 RX ORDER — NALOXONE HYDROCHLORIDE 0.4 MG/ML
80 INJECTION, SOLUTION INTRAMUSCULAR; INTRAVENOUS; SUBCUTANEOUS AS NEEDED
Status: DISCONTINUED | OUTPATIENT
Start: 2018-07-18 | End: 2018-07-18

## 2018-07-18 RX ORDER — MIDAZOLAM HYDROCHLORIDE 1 MG/ML
1 INJECTION INTRAMUSCULAR; INTRAVENOUS EVERY 5 MIN PRN
Status: DISCONTINUED | OUTPATIENT
Start: 2018-07-18 | End: 2018-07-18

## 2018-07-18 RX ORDER — MORPHINE SULFATE 4 MG/ML
2 INJECTION, SOLUTION INTRAMUSCULAR; INTRAVENOUS EVERY 5 MIN PRN
Status: DISCONTINUED | OUTPATIENT
Start: 2018-07-18 | End: 2018-07-18

## 2018-07-18 RX ORDER — HYDROCODONE BITARTRATE AND ACETAMINOPHEN 5; 325 MG/1; MG/1
1 TABLET ORAL EVERY 6 HOURS PRN
Qty: 20 TABLET | Refills: 0 | Status: SHIPPED | OUTPATIENT
Start: 2018-07-18 | End: 2018-08-01 | Stop reason: ALTCHOICE

## 2018-07-18 RX ORDER — KETOROLAC TROMETHAMINE 30 MG/ML
30 INJECTION, SOLUTION INTRAMUSCULAR; INTRAVENOUS ONCE
Status: COMPLETED | OUTPATIENT
Start: 2018-07-18 | End: 2018-07-18

## 2018-07-18 RX ORDER — BUPIVACAINE HYDROCHLORIDE AND EPINEPHRINE 5; 5 MG/ML; UG/ML
INJECTION, SOLUTION EPIDURAL; INTRACAUDAL; PERINEURAL AS NEEDED
Status: DISCONTINUED | OUTPATIENT
Start: 2018-07-18 | End: 2018-07-18 | Stop reason: HOSPADM

## 2018-07-18 RX ORDER — HYDROCODONE BITARTRATE AND ACETAMINOPHEN 5; 325 MG/1; MG/1
1 TABLET ORAL AS NEEDED
Status: COMPLETED | OUTPATIENT
Start: 2018-07-18 | End: 2018-07-18

## 2018-07-18 RX ORDER — ONDANSETRON 2 MG/ML
4 INJECTION INTRAMUSCULAR; INTRAVENOUS EVERY 4 HOURS PRN
Status: CANCELLED | OUTPATIENT
Start: 2018-07-18

## 2018-07-18 RX ORDER — ONDANSETRON 2 MG/ML
4 INJECTION INTRAMUSCULAR; INTRAVENOUS AS NEEDED
Status: DISCONTINUED | OUTPATIENT
Start: 2018-07-18 | End: 2018-07-18

## 2018-07-18 NOTE — OPERATIVE REPORT
[facility]   Operative Note    Beth Santiago Location: OR   CSN [id number] MRN [id number]   Admission Date 7/17/2018 Operation Date 7/18/2018   Attending Physician Nannette Tiwari MD Operating Physician Rica Cheema MD     Date of procedure injury, conversion to an open procedure, possible need for a drain, possible recurrence or persistence of symptoms despite surgery, postoperative diarrhea, possible need for further treatment or intervention pending cholangiogram results.   These and other dissection was kept above the line of Rouviere and a critical view was obtained. The cystic duct was then clipped once proximally on the specimen side. A ductotomy was made. A cholangiocatheter was introduced through the lateral 5 mm port.    The cholang patient did tolerate the procedure well. The patient was taken to the recovery room in stable condition. Complications:  None    EBL:   Minimal     Pathologic specimens:  The gallbladder and its contents    Remy Montalvo MD

## 2018-07-18 NOTE — BRIEF OP NOTE
Pre-Operative Diagnosis: Cholecystitis [K81.9], cholelithiasis     Post-Operative Diagnosis: CHOLECYSTITIS AND CHOLELITHIASIS      Procedure Performed:   Procedure(s):  LAPAROSCOPIC CHOLECYSTECTOMY WITH INTRAOPERATIVE CHOLANGIOGRAM    Surgeon(s) and Role:

## 2018-07-18 NOTE — ED NOTES
Patient states that Toradol did not help with pain. Patient given additional pain medication. Awaiting CT.

## 2018-07-18 NOTE — ED PROVIDER NOTES
Patient Seen in: THE [facility] Emergency Department In [place]    History   Patient presents with:  Nausea/Vomiting/Diarrhea (gastrointestinal)    Stated Complaint: abdomen and wraps around to back nausea    HPI    Patient developed pain in the right upper q No CVA tenderness.   Extremities: No peripheral edema or evidence of DVT       ED Course     Labs Reviewed   COMP METABOLIC PANEL (14) - Abnormal; Notable for the following:        Result Value    Glucose 125 (*)     BUN 24 (*)     Creatinine 1.14 (*)     A

## 2018-07-18 NOTE — ANESTHESIA PREPROCEDURE EVALUATION
PRE-OP EVALUATION    Patient Name: Sergei Uriarte    Pre-op Diagnosis: Cholecystitis [K81.9]    Procedure(s):  LAPAROSCOPIC CHOLECYSTECTOMY WITH INTRAOPERATIVE CHOLANGIOGRAM    Surgeon(s) and Role:     Waqar Rosales MD - Primary    Pre-op kelsie 07/18/2018   HGB 12.8 07/18/2018   HCT 38.0 07/18/2018   MCV 85.2 07/18/2018   MCH 28.7 07/18/2018   MCHC 33.7 07/18/2018   RDW 13.3 07/18/2018   .0 07/18/2018       Lab Results  Component Value Date    07/18/2018   K 3.7 07/18/2018    0

## 2018-07-18 NOTE — CONSULTS
[facility]  Report of Consultation    Ariadne Vargas Patient Status:  Emergency    1980 MRN [id number]   Location [address] [place] [address] Attending Eduardo Christopher MD   Hosp Day # 0 PCP None Pcp     Date of consu Nasal discharge  Eyes:  Negative for eye discharge, visual disturbance   Cardiovascular:  Negative for cool extremity and irregular heartbeat/palpitations  Respiratory:  Negative for cough, dyspnea and wheezing, SOB  Gastrointestinal: Positive abdominal pa GFRAA  70   GFRNAA  61   CA  9.4   ALB  3.9   NA  138   K  3.7   CL  107   CO2  22.0   ALKPHO  88   AST  36   ALT  30   BILT  0.2   TP  7.5         Assessment/Plan:     the patient is a 66-year-old female who is 6 months postpartum   She presents to the

## 2018-07-18 NOTE — ANESTHESIA POSTPROCEDURE EVALUATION
[address] [facility] Patient Status:  Emergency   Age/Gender 42year old female MRN [id number]   [facility] SURGERY Attending Carolynn Pierre MD   Hosp Day # 0 PCP None Pcp       Anesthesia Post-op Note    Procedure(s):

## 2018-08-01 ENCOUNTER — OFFICE VISIT (OUTPATIENT)
Dept: SURGERY | Facility: CLINIC | Age: 38
End: 2018-08-01

## 2018-08-01 VITALS
DIASTOLIC BLOOD PRESSURE: 68 MMHG | RESPIRATION RATE: 16 BRPM | HEIGHT: 61 IN | BODY MASS INDEX: 23.6 KG/M2 | HEART RATE: 82 BPM | WEIGHT: 125 LBS | SYSTOLIC BLOOD PRESSURE: 101 MMHG

## 2018-08-01 DIAGNOSIS — K81.0 ACUTE CHOLECYSTITIS: Primary | ICD-10-CM

## 2018-08-01 PROCEDURE — 99024 POSTOP FOLLOW-UP VISIT: CPT | Performed by: PHYSICIAN ASSISTANT

## 2018-08-01 NOTE — PROGRESS NOTES
Follow Up Visit Note       Active Problems      No diagnosis found. Chief Complaint   Patient presents with:  Post-Op: post-op 7.18 lap rodri RICHTER        History of Present Illness        Allergies  Jeromy Bush has No Known Allergies.     Past Medical / Mayra Freeman bleeding, blood in stool, constipation, diarrhea, nausea and vomiting. Genitourinary: Negative for difficulty urinating, dysuria, frequency and urgency. Musculoskeletal: Negative for arthralgias and myalgias. Skin: Negative for color change and rash.

## 2018-08-01 NOTE — PROGRESS NOTES
Follow Up Visit Note       Active Problems      1.  Acute cholecystitis          Chief Complaint   Patient presents with:  Post-Op: post-op 7.18 lap rodri BCK    History of Present Illness  The patient is a pleasant 77-year-old female who presents for her f Norethindrone (CAROLYN-BE) 0.35 MG Oral Tab Take 1 tablet (0.35 mg total) by mouth daily. Disp: 3 Package Rfl: 3        Review of Systems  The Review of Systems has been reviewed by me during today.   Review of Systems   Constitutional: Negative for chills, di Incision(s) clean, dry, intact, without erythema or cellulitis. Musculoskeletal: Normal range of motion. She exhibits no edema. Neurological: She is alert and oriented to person, place, and time. Skin: Skin is warm and dry. No rash noted.  She is

## 2018-08-03 ENCOUNTER — TELEPHONE (OUTPATIENT)
Dept: OBGYN CLINIC | Facility: CLINIC | Age: 38
End: 2018-08-03

## 2018-08-03 NOTE — TELEPHONE ENCOUNTER
PT would like a new prescription for [facility] since she stopped nursing. Please call back to discuss.

## 2018-08-03 NOTE — TELEPHONE ENCOUNTER
41 y/o called requesting to go back on combo OCP since she stopped nursing due to recent gall bladder removal. She is currently on Tarik Eye Be.   Last OV date: 06/05/18  Recent Test/Labs: N/A   Recommendations: Advised patient we will discuss with Princess Berry and call

## 2018-08-06 NOTE — TELEPHONE ENCOUNTER
Patient returned call. She reports having taken ortho novum last. [facility]. Routed to [facility] for rx.

## 2018-08-06 NOTE — TELEPHONE ENCOUNTER
OK to resume combination OCP, let me know the name of previous otherwise I will Rx a low dose BRITT.

## 2018-08-10 ENCOUNTER — TELEPHONE (OUTPATIENT)
Dept: OBGYN CLINIC | Facility: CLINIC | Age: 38
End: 2018-08-10

## 2018-08-10 ENCOUNTER — OFFICE VISIT (OUTPATIENT)
Dept: OBGYN CLINIC | Facility: CLINIC | Age: 38
End: 2018-08-10
Payer: COMMERCIAL

## 2018-08-10 VITALS
DIASTOLIC BLOOD PRESSURE: 70 MMHG | SYSTOLIC BLOOD PRESSURE: 120 MMHG | WEIGHT: 126 LBS | BODY MASS INDEX: 23.79 KG/M2 | HEIGHT: 61 IN

## 2018-08-10 DIAGNOSIS — Z30.09 BIRTH CONTROL COUNSELING: ICD-10-CM

## 2018-08-10 DIAGNOSIS — Z30.09 STERILIZATION CONSULT: Primary | ICD-10-CM

## 2018-08-10 PROCEDURE — 99213 OFFICE O/P EST LOW 20 MIN: CPT | Performed by: OBSTETRICS & GYNECOLOGY

## 2018-08-10 NOTE — PROGRESS NOTES
GYN H&P     8/10/2018  2:45 PM    CC: Patient presents with: Other: Consult for Surgery       HPI: patient is a 42year old  here for Patient presents with:   Other: Consult for Surgery     Pt is here for sterilization consult, however pt would 23.81 kg/m²    Exam:   GENERAL: well developed, well nourished, in no apparent distress    GYNE/:     deferred    OBGyn Exam          PLAN:    1. Sterilization consult  -discussed laparoscopic bilateral salpingectomy.  Pt aware this is permanent procedure

## 2018-08-10 NOTE — TELEPHONE ENCOUNTER
Fax received from [facility] regarding OCP. Pt has been receiving Alyacen brand name; has been discontinued. Per pharmacy, pt does not want generic. Discussed with patient; she states to disregard fax.   Pt will continue on Sherry Galley Be until Mirena IUD insertion in

## 2018-08-16 ENCOUNTER — OFFICE VISIT (OUTPATIENT)
Dept: FAMILY MEDICINE CLINIC | Facility: CLINIC | Age: 38
End: 2018-08-16
Payer: COMMERCIAL

## 2018-08-16 ENCOUNTER — TELEPHONE (OUTPATIENT)
Dept: OBGYN CLINIC | Facility: CLINIC | Age: 38
End: 2018-08-16

## 2018-08-16 DIAGNOSIS — Z11.1 SCREENING FOR TUBERCULOSIS: Primary | ICD-10-CM

## 2018-08-16 PROCEDURE — 86580 TB INTRADERMAL TEST: CPT | Performed by: NURSE PRACTITIONER

## 2018-08-16 NOTE — TELEPHONE ENCOUNTER
Patient has decided not to do the [facility]. She cancelled her appt. and would like another prescription. .. Norethindrone-Eth Estradiol 1-35 MG-MCG Oral Tab     does not make this anymore so she needs a substitute.     Sent to [id number]

## 2018-08-16 NOTE — PROGRESS NOTES
Geeta Varma 42year old female       [place] 2    · Live vaccines in the past month? no  · Any steroid medication in the past month? no  · History of BCG vaccine? no  · If female, are you currently pregnant?   no

## 2018-08-16 NOTE — PATIENT INSTRUCTIONS
You will need to return to clinic in 48-72 hours to have results of TB test read. Please return to clinic on 08/18/19 between 11:55am to 4:30pm  or on 08/19/18 between 9:00am to 1:30am to have your TB test read.

## 2018-08-16 NOTE — TELEPHONE ENCOUNTER
43 y/o called regarding OCP. She was taking Norethindrone-Eth Estradiol 1-35 MG-MCG Oral Tab previously. She was seen on 08/10/18 for sterilization consult, which she wishes to delay until December.  Patient was going to proceed with Mirena IUD but decided

## 2018-08-18 ENCOUNTER — OFFICE VISIT (OUTPATIENT)
Dept: FAMILY MEDICINE CLINIC | Facility: CLINIC | Age: 38
End: 2018-08-18

## 2018-08-18 DIAGNOSIS — Z11.1 SCREENING-PULMONARY TB: Primary | ICD-10-CM

## 2018-08-18 LAB — INDURATION (): 0 MM (ref 0–11)

## 2018-12-12 ENCOUNTER — TELEPHONE (OUTPATIENT)
Dept: OBGYN CLINIC | Facility: CLINIC | Age: 38
End: 2018-12-12

## 2018-12-12 DIAGNOSIS — Z12.39 BREAST CANCER SCREENING: Primary | ICD-10-CM

## 2019-01-21 ENCOUNTER — OFFICE VISIT (OUTPATIENT)
Dept: INTERNAL MEDICINE CLINIC | Facility: CLINIC | Age: 39
End: 2019-01-21
Payer: COMMERCIAL

## 2019-01-21 VITALS
SYSTOLIC BLOOD PRESSURE: 116 MMHG | TEMPERATURE: 98 F | BODY MASS INDEX: 22.52 KG/M2 | RESPIRATION RATE: 16 BRPM | DIASTOLIC BLOOD PRESSURE: 68 MMHG | HEIGHT: 62 IN | HEART RATE: 72 BPM | WEIGHT: 122.38 LBS

## 2019-01-21 DIAGNOSIS — Z13.29 SCREENING FOR ENDOCRINE, NUTRITIONAL, METABOLIC AND IMMUNITY DISORDER: ICD-10-CM

## 2019-01-21 DIAGNOSIS — Z13.21 SCREENING FOR ENDOCRINE, NUTRITIONAL, METABOLIC AND IMMUNITY DISORDER: ICD-10-CM

## 2019-01-21 DIAGNOSIS — Z13.228 SCREENING FOR ENDOCRINE, NUTRITIONAL, METABOLIC AND IMMUNITY DISORDER: ICD-10-CM

## 2019-01-21 DIAGNOSIS — Z00.00 ROUTINE GENERAL MEDICAL EXAMINATION AT A HEALTH CARE FACILITY: Primary | ICD-10-CM

## 2019-01-21 DIAGNOSIS — Z13.0 SCREENING FOR ENDOCRINE, NUTRITIONAL, METABOLIC AND IMMUNITY DISORDER: ICD-10-CM

## 2019-01-21 DIAGNOSIS — Z13.220 SCREENING FOR LIPID DISORDERS: ICD-10-CM

## 2019-01-21 DIAGNOSIS — Z13.29 SCREENING FOR THYROID DISORDER: ICD-10-CM

## 2019-01-21 DIAGNOSIS — Z23 NEEDS FLU SHOT: ICD-10-CM

## 2019-01-21 DIAGNOSIS — Z13.0 SCREENING FOR OTHER DISORDERS OF BLOOD AND BLOOD-FORMING ORGANS: ICD-10-CM

## 2019-01-21 PROCEDURE — 90686 IIV4 VACC NO PRSV 0.5 ML IM: CPT | Performed by: INTERNAL MEDICINE

## 2019-01-21 PROCEDURE — 99386 PREV VISIT NEW AGE 40-64: CPT | Performed by: INTERNAL MEDICINE

## 2019-01-21 PROCEDURE — 90471 IMMUNIZATION ADMIN: CPT | Performed by: INTERNAL MEDICINE

## 2019-01-21 NOTE — PROGRESS NOTES
Patient presents with:  Physical: cn room 3: new patient heree for a physical today       HPI:    Patient here for cpe without gyne exam  utd on pap, scheduled for mammogram   with 2 little kids, working full time again.  Her  is a psychiatris Allergies  No Known Allergies    Health Maintenance  Immunizations:    Immunization History  Administered            Date(s) Administered    FLULAVAL 6 months & older 0.5 ml Prefilled syringe (34631)                          11/15/2017  01/21/2019 ml Quad  [id number]      Meds & Refills for this Visit:  Requested Prescriptions      No prescriptions requested or ordered in this encounter       Imaging & Consults:  FLULAVAL INFLUENZA VACCINE QUAD PRESERVATIVE FREE 0.5 ML      Return if symptoms worsen

## 2019-01-26 ENCOUNTER — HOSPITAL ENCOUNTER (OUTPATIENT)
Dept: MAMMOGRAPHY | Age: 39
Discharge: HOME | End: 2019-01-26
Attending: NURSE PRACTITIONER
Payer: COMMERCIAL

## 2019-01-26 DIAGNOSIS — Z12.39 BREAST CANCER SCREENING: ICD-10-CM

## 2019-01-26 PROCEDURE — 77067 SCR MAMMO BI INCL CAD: CPT | Performed by: NURSE PRACTITIONER

## 2019-01-26 PROCEDURE — 77063 BREAST TOMOSYNTHESIS BI: CPT | Performed by: NURSE PRACTITIONER

## 2019-01-31 NOTE — TELEPHONE ENCOUNTER
Spoke to patient. She ended up having gall bladder removed emergently. She stated she wants to post-pone sterilization at this time. She is not due for annual until June 2019. OK to refill at this time?

## 2019-02-04 RX ORDER — NORETHINDRONE AND ETHINYL ESTRADIOL 1 MG-35MCG
KIT ORAL
Qty: 84 TABLET | Refills: 1 | Status: SHIPPED | OUTPATIENT
Start: 2019-02-04 | End: 2019-03-27

## 2019-02-04 NOTE — TELEPHONE ENCOUNTER
Last OV: 8/10/18 with Dr. Andrae Kimbrough, consult regarding surgery. Discussed Mirena. Patient decided to not proceed with Mirena or TL discussed. Continued with OCP. Last annual was 6/5/18 with [facility].   Last refill date: 8/16/18  Follow-up: 1 year for annual- 6/2019

## 2019-03-15 ENCOUNTER — TELEPHONE (OUTPATIENT)
Dept: INTERNAL MEDICINE CLINIC | Facility: CLINIC | Age: 39
End: 2019-03-15

## 2019-03-15 NOTE — TELEPHONE ENCOUNTER
Called pt back. Advised TB recommends ER visit as she will need imaging to r/o diverticulitis or appendicitis with the location of her pain. Pt agrees to this plan and verbalized understanding. Cancelled appt today at 3:15 pm, pt aware. FYI to TB.

## 2019-03-15 NOTE — TELEPHONE ENCOUNTER
Nydiaailyn Woodrow [birth year] calling stating she has hx of diverticulitis and for past 2 days had pain lower R side of abd-painful to press on area and pain when she bends over-she wants to know if she should come in or go to ER? can someone talk to her please?  i

## 2019-03-15 NOTE — TELEPHONE ENCOUNTER
Called pt stating x2 days experiencing lower right abd pain. x2 days ago diarrhea- subsided. No blood in stool. No abd swelling or bloating. No N/V. No fever or chills. Hx diverticulitis- unsure if related. Pt ok to wait for 3:15pm appt today w/ TB.  If sx

## 2019-03-25 ENCOUNTER — TELEPHONE (OUTPATIENT)
Dept: OBGYN CLINIC | Facility: CLINIC | Age: 39
End: 2019-03-25

## 2019-03-25 NOTE — TELEPHONE ENCOUNTER
Current birth control making her irritable, says the weeks that she doesn't take pill she is fine. Wondering if it can be switched to different type.     Please advise

## 2019-03-25 NOTE — TELEPHONE ENCOUNTER
35 y/o called regarding birth control. She states she feels irritable when she is on active pills and feels much better when she is on placebo pills. She has been on this OCP for a while and requests to change.   Last OV date: 08/10/18  Recent Test/Labs: NA

## 2019-03-27 RX ORDER — NORETHINDRONE ACETATE AND ETHINYL ESTRADIOL 1MG-20(21)
1 KIT ORAL DAILY
Qty: 3 PACKAGE | Refills: 0 | Status: SHIPPED | OUTPATIENT
Start: 2019-03-27 | End: 2019-06-23

## 2019-04-09 ENCOUNTER — OFFICE VISIT (OUTPATIENT)
Dept: NEUROLOGY | Facility: CLINIC | Age: 39
End: 2019-04-09
Payer: COMMERCIAL

## 2019-04-09 VITALS
WEIGHT: 123 LBS | SYSTOLIC BLOOD PRESSURE: 118 MMHG | RESPIRATION RATE: 16 BRPM | DIASTOLIC BLOOD PRESSURE: 68 MMHG | BODY MASS INDEX: 23 KG/M2 | HEART RATE: 68 BPM

## 2019-04-09 DIAGNOSIS — M54.10 RADICULAR PAIN OF RIGHT LOWER EXTREMITY: ICD-10-CM

## 2019-04-09 DIAGNOSIS — M54.16 RADICULOPATHY OF LUMBAR REGION: Primary | ICD-10-CM

## 2019-04-09 DIAGNOSIS — M54.41 ACUTE RIGHT-SIDED LOW BACK PAIN WITH RIGHT-SIDED SCIATICA: ICD-10-CM

## 2019-04-09 DIAGNOSIS — M54.17 LUMBOSACRAL RADICULOPATHY: ICD-10-CM

## 2019-04-09 PROBLEM — M54.50 ACUTE RIGHT-SIDED LOW BACK PAIN: Status: ACTIVE | Noted: 2019-04-09

## 2019-04-09 PROCEDURE — 99204 OFFICE O/P NEW MOD 45 MIN: CPT | Performed by: OTHER

## 2019-04-09 RX ORDER — METHYLPREDNISOLONE 4 MG/1
TABLET ORAL
Qty: 1 PACKAGE | Refills: 0 | Status: SHIPPED | OUTPATIENT
Start: 2019-04-09 | End: 2019-06-17 | Stop reason: ALTCHOICE

## 2019-04-09 RX ORDER — CETIRIZINE HYDROCHLORIDE 10 MG/1
10 TABLET ORAL DAILY
COMMUNITY

## 2019-04-09 NOTE — PROGRESS NOTES
[facility]   Neurology; INITIAL CLINIC VISIT  2019, 10:51 AM     Niki Pablo Patient Status:  No patient class for patient encounter    1980 MRN [id number]   Location [address] [place] [address] Tablet Therapy Pack Take as instructed Disp: 1 Package Rfl: 0   Norethin Ace-Eth Estrad-FE (JUNEL FE 1/20) 1-20 MG-MCG Oral Tab Take 1 tablet by mouth daily.  Disp: 3 Package Rfl: 0         REVIEW OF SYSTEMS:    GENERAL HEALTH:  feels well, calm, normal dex reactive, Fundi normal       CN III, IV, VI:  Horrizontal and vertical movements normal.  Normal pursuit and saccades.                             No nystagmus, no ptosis       CN V: Masseters strong, Facial sensations normal,        CN  VII:  Symmetric fac of medications were discussed in details. The patient was  given ample opportunity to ask questions. All questions and concerns were addressed.      Joel Velasquez MD  General Neurology   Neuromuscular/ Electrodiagnostic Specialist  [facility]

## 2019-04-09 NOTE — PROGRESS NOTES
The patient states she is having lower back that goes down her leg and stop mid calf. The patient states the pain is constant. The patient states this started about a year and a half ago.

## 2019-04-22 ENCOUNTER — HOSPITAL ENCOUNTER (OUTPATIENT)
Dept: PHYSICAL THERAPY | Facility: HOSPITAL | Age: 39
Setting detail: THERAPIES SERIES
Discharge: HOME | End: 2019-04-22
Attending: Other
Payer: COMMERCIAL

## 2019-04-22 DIAGNOSIS — M54.16 RADICULOPATHY OF LUMBAR REGION: ICD-10-CM

## 2019-04-22 PROCEDURE — 97110 THERAPEUTIC EXERCISES: CPT

## 2019-04-22 PROCEDURE — 97162 PT EVAL MOD COMPLEX 30 MIN: CPT

## 2019-04-22 NOTE — PROGRESS NOTES
SPINE EVALUATION:   Referring Physician: Dr. Alonso Staples  Diagnosis: lumbar radiculopathy     Date of Service: 4/22/2019     PATIENT SUMMARY   Carroll France is a 34year old y/o female who presents to therapy today with complaints of R lumbar radiculopathy. L WFL  Rotation: R WFL; L WFL    Accessory motion: NT  Palpation: slight tenderness to palpation of R PSIS    Strength:   LE   Hip flexion: R 4/5; L 4/5  Hip abduction: R 4+/5; L 4+/5   Knee extension: R 5/5; L 5/5   DF: R 4/5; L 4/5      Special tests: If you have any questions, please contact me at Dept: [phone]    Sincerely,  Electronically signed by therapist: Fab Castillo PT    [de-identified] certification required: Yes  I certify the need for these services furnished under this plan of treatme

## 2019-04-26 ENCOUNTER — HOSPITAL ENCOUNTER (OUTPATIENT)
Dept: PHYSICAL THERAPY | Facility: HOSPITAL | Age: 39
Setting detail: THERAPIES SERIES
Discharge: HOME | End: 2019-04-26
Attending: Other
Payer: COMMERCIAL

## 2019-04-26 PROCEDURE — 97110 THERAPEUTIC EXERCISES: CPT

## 2019-04-26 PROCEDURE — 97140 MANUAL THERAPY 1/> REGIONS: CPT

## 2019-04-26 NOTE — PROGRESS NOTES
Dx: R Lumbar radiculopathy         Authorized # of Visits:  [id number]         Next MD visit: none scheduled  Fall Risk: standard         Precautions: n/a             Subjective: Patient reports that the sharp, most distal pain    Objective:   Sharp pain in R late

## 2019-04-29 ENCOUNTER — HOSPITAL ENCOUNTER (OUTPATIENT)
Dept: PHYSICAL THERAPY | Facility: HOSPITAL | Age: 39
Setting detail: THERAPIES SERIES
Discharge: HOME | End: 2019-04-29
Attending: Other
Payer: COMMERCIAL

## 2019-04-29 PROCEDURE — 97140 MANUAL THERAPY 1/> REGIONS: CPT

## 2019-04-29 PROCEDURE — 97110 THERAPEUTIC EXERCISES: CPT

## 2019-04-29 RX ORDER — NORETHINDRONE ACETATE AND ETHINYL ESTRADIOL AND FERROUS FUMARATE 1MG-20(21)
KIT ORAL
Qty: 84 TABLET | Refills: 0 | OUTPATIENT
Start: 2019-04-29

## 2019-04-29 NOTE — PROGRESS NOTES
Dx: R Lumbar radiculopathy         Authorized # of Visits:  61         Next MD visit: none scheduled  Fall Risk: standard         Precautions: n/a             Subjective: Patient reports that her pain is worse today, likely due to being in the car a lot to Supine DKTC stretch 2x30 sec        Seated hamstring stretch 2x30 sec BL         Seated SB lumbar flexion x10 Seated SB lumbar flexion x10        Step stool stretch x10 on R Step stool stretch x10 on R         BOSU forward lunge with trunk rotation x20

## 2019-05-03 ENCOUNTER — HOSPITAL ENCOUNTER (OUTPATIENT)
Dept: PHYSICAL THERAPY | Facility: HOSPITAL | Age: 39
Setting detail: THERAPIES SERIES
Discharge: HOME | End: 2019-05-03
Attending: Other
Payer: COMMERCIAL

## 2019-05-03 PROCEDURE — 97110 THERAPEUTIC EXERCISES: CPT

## 2019-05-03 PROCEDURE — 97140 MANUAL THERAPY 1/> REGIONS: CPT

## 2019-05-03 NOTE — PROGRESS NOTES
Dx: R Lumbar radiculopathy         Authorized # of Visits:  61         Next MD visit: none scheduled  Fall Risk: standard         Precautions: n/a             Subjective: Patient reports that she has not been having any low back or distal thigh pain but th Manual R hooklying traction  R sciatic nerve glides  R hamstring stretch  x10' total Manual R hooklying traction  R sciatic nerve glides  R hamstring stretch  x10' total Manual R hooklying traction  R sciatic nerve glides  R hamstring stretch  S/l lumb

## 2019-05-06 ENCOUNTER — HOSPITAL ENCOUNTER (OUTPATIENT)
Dept: PHYSICAL THERAPY | Facility: HOSPITAL | Age: 39
Setting detail: THERAPIES SERIES
Discharge: HOME | End: 2019-05-06
Attending: Other
Payer: COMMERCIAL

## 2019-05-06 PROCEDURE — 97140 MANUAL THERAPY 1/> REGIONS: CPT

## 2019-05-06 PROCEDURE — 97110 THERAPEUTIC EXERCISES: CPT

## 2019-05-06 NOTE — PROGRESS NOTES
Dx: R Lumbar radiculopathy         Authorized # of Visits:  61         Next MD visit: none scheduled  Fall Risk: standard         Precautions: n/a             Subjective: Patient reports that she has not been performing her HEP very regularly and she devel nerve glides  R hamstring stretch  x10' total Manual R hooklying traction  R sciatic nerve glides  R hamstring stretch  S/l lumbar rotation in passive flexion  x10' total Manual R hooklying traction  R sciatic nerve glides  R hamstring stretch  x10' total

## 2019-05-10 ENCOUNTER — HOSPITAL ENCOUNTER (OUTPATIENT)
Dept: PHYSICAL THERAPY | Facility: HOSPITAL | Age: 39
Setting detail: THERAPIES SERIES
Discharge: HOME | End: 2019-05-10
Attending: Other
Payer: COMMERCIAL

## 2019-05-10 PROCEDURE — 97140 MANUAL THERAPY 1/> REGIONS: CPT

## 2019-05-10 PROCEDURE — 97110 THERAPEUTIC EXERCISES: CPT

## 2019-05-10 NOTE — PROGRESS NOTES
Dx: R Lumbar radiculopathy         Authorized # of Visits:  61         Next MD visit: none scheduled  Fall Risk: standard         Precautions: n/a             Subjective: Patient reports that she has been very diligent about doing her HEP at home.     Natividad Williamson stretch  x10' total Manual R hooklying traction  R sciatic nerve glides  R hamstring stretch  x10' total Manual R hooklying traction  R sciatic nerve glides  R hamstring stretch  S/l lumbar rotation in passive flexion  x10' total Manual R hooklying tractio

## 2019-05-13 ENCOUNTER — APPOINTMENT (OUTPATIENT)
Dept: PHYSICAL THERAPY | Facility: HOSPITAL | Age: 39
End: 2019-05-13
Attending: Other
Payer: COMMERCIAL

## 2019-05-17 ENCOUNTER — APPOINTMENT (OUTPATIENT)
Dept: PHYSICAL THERAPY | Facility: HOSPITAL | Age: 39
End: 2019-05-17
Attending: Other
Payer: COMMERCIAL

## 2019-05-31 ENCOUNTER — HOSPITAL ENCOUNTER (OUTPATIENT)
Dept: PHYSICAL THERAPY | Facility: HOSPITAL | Age: 39
Setting detail: THERAPIES SERIES
Discharge: HOME | End: 2019-05-31
Attending: INTERNAL MEDICINE
Payer: COMMERCIAL

## 2019-05-31 PROCEDURE — 97110 THERAPEUTIC EXERCISES: CPT

## 2019-05-31 NOTE — PROGRESS NOTES
Dx: R Lumbar radiculopathy         Authorized # of Visits:  61         Next MD visit: none scheduled  Fall Risk: standard         Precautions: n/a             Subjective: Patient reports that she has not noticed the constant pain in the back of the leg whe cuing for PPT, 2' Lower trunk rotation x2' Child's pose 10 sec holds x5     Manual R hooklying traction  R sciatic nerve glides  R hamstring stretch  x10' total Manual R hooklying traction  R sciatic nerve glides  R hamstring stretch  x10' total Manual R h

## 2019-06-07 ENCOUNTER — APPOINTMENT (OUTPATIENT)
Dept: PHYSICAL THERAPY | Facility: HOSPITAL | Age: 39
End: 2019-06-07
Attending: INTERNAL MEDICINE
Payer: COMMERCIAL

## 2019-06-17 ENCOUNTER — OFFICE VISIT (OUTPATIENT)
Dept: NEUROLOGY | Facility: CLINIC | Age: 39
End: 2019-06-17
Payer: COMMERCIAL

## 2019-06-17 VITALS
WEIGHT: 123 LBS | DIASTOLIC BLOOD PRESSURE: 68 MMHG | HEART RATE: 68 BPM | BODY MASS INDEX: 23 KG/M2 | RESPIRATION RATE: 14 BRPM | SYSTOLIC BLOOD PRESSURE: 120 MMHG

## 2019-06-17 DIAGNOSIS — M54.41 ACUTE RIGHT-SIDED LOW BACK PAIN WITH RIGHT-SIDED SCIATICA: Primary | ICD-10-CM

## 2019-06-17 DIAGNOSIS — M54.10 RADICULAR PAIN OF RIGHT LOWER EXTREMITY: ICD-10-CM

## 2019-06-17 DIAGNOSIS — M54.17 LUMBOSACRAL RADICULOPATHY: ICD-10-CM

## 2019-06-17 PROCEDURE — 99213 OFFICE O/P EST LOW 20 MIN: CPT | Performed by: OTHER

## 2019-06-17 NOTE — PROGRESS NOTES
[facility]   Neurology; follow up CLINIC VISIT  2019    [place] Patient Status:  No patient class for patient encounter    1980 MRN [id number]   Location [address] [place] [address], [address] [place] [address] BRAD Mirza smoked. She has never used smokeless tobacco. She reports that she drinks alcohol. She reports that she does not use drugs.     ALLERGIES:  No Known Allergies    MEDICATIONS:    Current Outpatient Medications:  cetirizine 10 MG Oral Tab Take 10 mg by mouth expression and comprehension, no dysarthria, voice is normal in volume, follow commends well;  Memory and comprehension:  MMSE is normal,   Cranial Nerves       CN II:  Visual fields full, Pupils equal and reactive, Fundi normal       CN III, IV, VI:  Horr questions. All questions and concerns were addressed.      Thom Steele MD  General Neurology   Neuromuscular/ Electrodiagnostic Specialist  [facility]  6/17/2019

## 2019-06-17 NOTE — PROGRESS NOTES
The patient states she is no longer having the shooting pain in her right leg. The patient states she is still having pain more in her right buttock.

## 2019-06-24 RX ORDER — NORETHINDRONE ACETATE AND ETHINYL ESTRADIOL AND FERROUS FUMARATE 1MG-20(21)
KIT ORAL
Qty: 84 TABLET | Refills: 0 | Status: SHIPPED | OUTPATIENT
Start: 2019-06-24 | End: 2019-09-13

## 2019-07-12 ENCOUNTER — OFFICE VISIT (OUTPATIENT)
Dept: OBGYN CLINIC | Facility: CLINIC | Age: 39
End: 2019-07-12
Payer: COMMERCIAL

## 2019-07-12 VITALS
HEART RATE: 69 BPM | WEIGHT: 122 LBS | SYSTOLIC BLOOD PRESSURE: 106 MMHG | HEIGHT: 61 IN | DIASTOLIC BLOOD PRESSURE: 70 MMHG | BODY MASS INDEX: 23.03 KG/M2

## 2019-07-12 DIAGNOSIS — Z12.4 CERVICAL CANCER SCREENING: ICD-10-CM

## 2019-07-12 DIAGNOSIS — Z01.419 WELL WOMAN EXAM: Primary | ICD-10-CM

## 2019-07-12 DIAGNOSIS — Z12.39 BREAST CANCER SCREENING: ICD-10-CM

## 2019-07-12 PROCEDURE — 99396 PREV VISIT EST AGE 40-64: CPT | Performed by: OBSTETRICS & GYNECOLOGY

## 2019-07-12 NOTE — PROGRESS NOTES
GYN H&P     2019  9:29 AM    CC: Patient presents with:  Physical      HPI: patient is a 34year old  here for her annual gyne exam.    Taking ocp, on second month, slightly irregular menses  Normal pap and hpv neg   x 2  Normal mammogra file    Social Needs      Financial resource strain: Not on file      Food insecurity:        Worry: Not on file        Inability: Not on file      Transportation needs:        Medical: Not on file        Non-medical: Not on file    Tobacco Use      Rubain Musculoskeletal: Negative  Skin: Negative    Neurological: Negative   Psychiatric/Behavioral: The patient is not nervous/anxious.  No depression        O /70   Pulse 69   Ht 61\"   Wt 122 lb   LMP  (LMP Unknown)   BMI 23.05 kg/m²   Body mass index i

## 2019-07-25 ENCOUNTER — TELEPHONE (OUTPATIENT)
Dept: OBGYN CLINIC | Facility: CLINIC | Age: 39
End: 2019-07-25

## 2019-07-25 NOTE — TELEPHONE ENCOUNTER
Pt was last seen on 7/12/19 by Dr. Angie Dave. Pt advised to schedule a presurgical appt with another provider to discuss surgery. Routed to [facility] staff. Please call patient to schedule appt.

## 2019-09-13 RX ORDER — NORETHINDRONE ACETATE AND ETHINYL ESTRADIOL AND FERROUS FUMARATE 1MG-20(21)
KIT ORAL
Qty: 84 TABLET | Refills: 0 | Status: SHIPPED | OUTPATIENT
Start: 2019-09-13 | End: 2019-12-01

## 2019-09-13 NOTE — TELEPHONE ENCOUNTER
Last OV: 7/12/19 with Dr. Marga Mar for annual  Last refill date: 6/24/19 - 90 day supply  Follow-up: October/November 2019  Next appt.: 10/10/19 with Dr. Trev Griffin    One refill sent.

## 2019-10-10 ENCOUNTER — OFFICE VISIT (OUTPATIENT)
Dept: OBGYN CLINIC | Facility: CLINIC | Age: 39
End: 2019-10-10
Payer: COMMERCIAL

## 2019-10-10 ENCOUNTER — TELEPHONE (OUTPATIENT)
Dept: OBGYN CLINIC | Facility: CLINIC | Age: 39
End: 2019-10-10

## 2019-10-10 VITALS
HEIGHT: 61 IN | SYSTOLIC BLOOD PRESSURE: 120 MMHG | BODY MASS INDEX: 24.2 KG/M2 | WEIGHT: 128.19 LBS | HEART RATE: 90 BPM | DIASTOLIC BLOOD PRESSURE: 66 MMHG

## 2019-10-10 DIAGNOSIS — Z30.09 BIRTH CONTROL COUNSELING: Primary | ICD-10-CM

## 2019-10-10 DIAGNOSIS — Z30.09 STERILIZATION CONSULT: ICD-10-CM

## 2019-10-10 PROCEDURE — 99213 OFFICE O/P EST LOW 20 MIN: CPT | Performed by: OBSTETRICS & GYNECOLOGY

## 2019-10-10 NOTE — TELEPHONE ENCOUNTER
Spoke with patient. Offered 12/5/19 at 0730. She was hoping for later in December; doesn't have her schedule in front of her right now. She will call back after she sees what her calendar looks like for that week.  Patient is aware that this is only being h

## 2019-10-10 NOTE — PROGRESS NOTES
Toan Ag is a 34year old female. HPI:   Patient presents with:   Other: pre op consult - tubal removal.       reviewed option, alternatives  Risks reviewed  Questions answered    Desires laparoscopic bilateral salpingectomy      HISTORY:  Past Me procedure was reviewed, all questions were addressed and answered to the patient's satisfaction/      Desires to be done in december       Orders This Visit:  No orders of the defined types were placed in this encounter.       Meds This Visit:  Requested Pr

## 2019-10-10 NOTE — TELEPHONE ENCOUNTER
----- Message from Alicia Crews MD sent at 10/10/2019  1:47 PM CDT -----  Laparoscopic bilateral salpingectomy    Need asst  Desires to be done in December  Diagnosis: desires sterilization  Anesthesia: general    outpt proc

## 2019-10-11 NOTE — TELEPHONE ENCOUNTER
Contacted patient. She really would prefer later in December if possible- last 2 weeks. Nothing available with Dr. Darnell Atkinsno at this time.  Patient will call back when she decides what she wants to do; she may call back to schedule appt with another MD in th

## 2019-12-02 RX ORDER — NORETHINDRONE ACETATE AND ETHINYL ESTRADIOL AND FERROUS FUMARATE 1MG-20(21)
KIT ORAL
Qty: 84 TABLET | Refills: 2 | Status: SHIPPED | OUTPATIENT
Start: 2019-12-02 | End: 2020-07-14 | Stop reason: DRUGHIGH

## 2019-12-03 NOTE — TELEPHONE ENCOUNTER
Last OV: 10/10/19 with Dr. Aki Padgett for birth control counseling; last annual 7/12/19  Last refill date: 9/13/19  Follow-up: 1 year or sooner for pre-op to discuss TL  Next appt.: none scheduled; due for annual 7/2020    Refill sent.  Patient is not schedul

## 2020-01-18 ENCOUNTER — LAB ENCOUNTER (OUTPATIENT)
Dept: LAB | Age: 40
End: 2020-01-18
Attending: INTERNAL MEDICINE
Payer: COMMERCIAL

## 2020-01-18 DIAGNOSIS — Z00.00 ROUTINE GENERAL MEDICAL EXAMINATION AT A HEALTH CARE FACILITY: ICD-10-CM

## 2020-01-18 LAB
ALBUMIN SERPL-MCNC: 3.4 G/DL (ref 3.4–5)
ALBUMIN/GLOB SERPL: 0.9 {RATIO} (ref 1–2)
ALP LIVER SERPL-CCNC: 37 U/L (ref 37–98)
ALT SERPL-CCNC: 27 U/L (ref 13–56)
ANION GAP SERPL CALC-SCNC: 5 MMOL/L (ref 0–18)
AST SERPL-CCNC: 20 U/L (ref 15–37)
BASOPHILS # BLD AUTO: 0.11 X10(3) UL (ref 0–0.2)
BASOPHILS NFR BLD AUTO: 2.2 %
BILIRUB SERPL-MCNC: 0.3 MG/DL (ref 0.1–2)
BUN BLD-MCNC: 13 MG/DL (ref 7–18)
BUN/CREAT SERPL: 13.5 (ref 10–20)
CALCIUM BLD-MCNC: 9.2 MG/DL (ref 8.5–10.1)
CHLORIDE SERPL-SCNC: 110 MMOL/L (ref 98–112)
CHOLEST SMN-MCNC: 192 MG/DL (ref ?–200)
CO2 SERPL-SCNC: 25 MMOL/L (ref 21–32)
CREAT BLD-MCNC: 0.96 MG/DL (ref 0.55–1.02)
DEPRECATED RDW RBC AUTO: 44.1 FL (ref 35.1–46.3)
EOSINOPHIL # BLD AUTO: 0.22 X10(3) UL (ref 0–0.7)
EOSINOPHIL NFR BLD AUTO: 4.3 %
ERYTHROCYTE [DISTWIDTH] IN BLOOD BY AUTOMATED COUNT: 13.1 % (ref 11–15)
GLOBULIN PLAS-MCNC: 3.8 G/DL (ref 2.8–4.4)
GLUCOSE BLD-MCNC: 75 MG/DL (ref 70–99)
HCT VFR BLD AUTO: 40.6 % (ref 35–48)
HDLC SERPL-MCNC: 78 MG/DL (ref 40–59)
HGB BLD-MCNC: 13 G/DL (ref 12–16)
IMM GRANULOCYTES # BLD AUTO: 0.01 X10(3) UL (ref 0–1)
IMM GRANULOCYTES NFR BLD: 0.2 %
LDLC SERPL CALC-MCNC: 98 MG/DL (ref ?–100)
LYMPHOCYTES # BLD AUTO: 1.92 X10(3) UL (ref 1–4)
LYMPHOCYTES NFR BLD AUTO: 37.7 %
M PROTEIN MFR SERPL ELPH: 7.2 G/DL (ref 6.4–8.2)
MCH RBC QN AUTO: 29.2 PG (ref 26–34)
MCHC RBC AUTO-ENTMCNC: 32 G/DL (ref 31–37)
MCV RBC AUTO: 91.2 FL (ref 80–100)
MONOCYTES # BLD AUTO: 0.49 X10(3) UL (ref 0.1–1)
MONOCYTES NFR BLD AUTO: 9.6 %
NEUTROPHILS # BLD AUTO: 2.34 X10 (3) UL (ref 1.5–7.7)
NEUTROPHILS # BLD AUTO: 2.34 X10(3) UL (ref 1.5–7.7)
NEUTROPHILS NFR BLD AUTO: 46 %
NONHDLC SERPL-MCNC: 114 MG/DL (ref ?–130)
OSMOLALITY SERPL CALC.SUM OF ELEC: 289 MOSM/KG (ref 275–295)
PATIENT FASTING Y/N/NP: YES
PATIENT FASTING Y/N/NP: YES
PLATELET # BLD AUTO: 203 10(3)UL (ref 150–450)
POTASSIUM SERPL-SCNC: 4.1 MMOL/L (ref 3.5–5.1)
RBC # BLD AUTO: 4.45 X10(6)UL (ref 3.8–5.3)
SODIUM SERPL-SCNC: 140 MMOL/L (ref 136–145)
TRIGL SERPL-MCNC: 78 MG/DL (ref 30–149)
TSI SER-ACNC: 2.09 MIU/ML (ref 0.36–3.74)
VLDLC SERPL CALC-MCNC: 16 MG/DL (ref 0–30)
WBC # BLD AUTO: 5.1 X10(3) UL (ref 4–11)

## 2020-01-18 PROCEDURE — 84443 ASSAY THYROID STIM HORMONE: CPT

## 2020-01-18 PROCEDURE — 80061 LIPID PANEL: CPT

## 2020-01-18 PROCEDURE — 85025 COMPLETE CBC W/AUTO DIFF WBC: CPT

## 2020-01-18 PROCEDURE — 80053 COMPREHEN METABOLIC PANEL: CPT

## 2020-01-18 PROCEDURE — 36415 COLL VENOUS BLD VENIPUNCTURE: CPT

## 2020-01-31 ENCOUNTER — OFFICE VISIT (OUTPATIENT)
Dept: INTERNAL MEDICINE CLINIC | Facility: CLINIC | Age: 40
End: 2020-01-31
Payer: COMMERCIAL

## 2020-01-31 VITALS
HEIGHT: 61 IN | RESPIRATION RATE: 16 BRPM | HEART RATE: 64 BPM | SYSTOLIC BLOOD PRESSURE: 120 MMHG | WEIGHT: 124 LBS | DIASTOLIC BLOOD PRESSURE: 82 MMHG | BODY MASS INDEX: 23.41 KG/M2

## 2020-01-31 DIAGNOSIS — F41.9 ANXIETY: ICD-10-CM

## 2020-01-31 DIAGNOSIS — Z00.00 ROUTINE GENERAL MEDICAL EXAMINATION AT A HEALTH CARE FACILITY: Primary | ICD-10-CM

## 2020-01-31 PROCEDURE — 99396 PREV VISIT EST AGE 40-64: CPT | Performed by: INTERNAL MEDICINE

## 2020-01-31 NOTE — PROGRESS NOTES
Patient presents with:  Physical: cn room 4: physical       HPI:    Patient here for CPE without gyne exam  Seeing her gyne next month, due for mammogram and she will schedule this. Pap due next month   with 2 little kids.  C/o generalized anxiety si Mother 50   • Other (Other) Mother         Hep C     Social History    Tobacco Use      Smoking status: Never Smoker      Smokeless tobacco: Never Used    Alcohol use:  Yes      Alcohol/week: 0.0 standard drinks      Frequency: 2-4 times a month      Drinks tender, no masses, no organomegaly or hernias. Breasts: no masses or lumps, no LAD  Neurological:  No cranial nerve deficit or sensory deficit. Normal muscle tone. Coordination normal.   Skin: Skin is warm and dry. No rash noted. No erythema. No pallor.

## 2020-02-26 ENCOUNTER — HOSPITAL ENCOUNTER (OUTPATIENT)
Dept: MAMMOGRAPHY | Age: 40
Discharge: HOME | End: 2020-02-26
Attending: OBSTETRICS & GYNECOLOGY
Payer: COMMERCIAL

## 2020-02-26 DIAGNOSIS — R92.2 DENSE BREAST TISSUE ON MAMMOGRAM: Primary | ICD-10-CM

## 2020-02-26 DIAGNOSIS — R92.2 INCONCLUSIVE MAMMOGRAM DUE TO DENSE BREASTS: ICD-10-CM

## 2020-02-26 DIAGNOSIS — Z12.31 ENCOUNTER FOR SCREENING MAMMOGRAM FOR MALIGNANT NEOPLASM OF BREAST: ICD-10-CM

## 2020-02-26 PROCEDURE — 77063 BREAST TOMOSYNTHESIS BI: CPT | Performed by: OBSTETRICS & GYNECOLOGY

## 2020-02-26 PROCEDURE — 77067 SCR MAMMO BI INCL CAD: CPT | Performed by: OBSTETRICS & GYNECOLOGY

## 2020-02-26 NOTE — PROGRESS NOTES
Orders placed. Spoke with patient. Reported results and recommendations. Questions answered and patient states understanding.

## 2020-02-28 ENCOUNTER — HOSPITAL ENCOUNTER (OUTPATIENT)
Dept: MAMMOGRAPHY | Facility: HOSPITAL | Age: 40
Discharge: HOME | End: 2020-02-28
Attending: OBSTETRICS & GYNECOLOGY
Payer: COMMERCIAL

## 2020-02-28 DIAGNOSIS — R92.2 INCONCLUSIVE MAMMOGRAM DUE TO DENSE BREASTS: ICD-10-CM

## 2020-02-28 DIAGNOSIS — R92.2 DENSE BREAST TISSUE ON MAMMOGRAM: ICD-10-CM

## 2020-02-28 PROCEDURE — 76642 ULTRASOUND BREAST LIMITED: CPT | Performed by: OBSTETRICS & GYNECOLOGY

## 2020-02-28 PROCEDURE — 77065 DX MAMMO INCL CAD UNI: CPT | Performed by: OBSTETRICS & GYNECOLOGY

## 2020-02-28 PROCEDURE — 77061 BREAST TOMOSYNTHESIS UNI: CPT | Performed by: OBSTETRICS & GYNECOLOGY

## 2020-05-08 ENCOUNTER — TELEMEDICINE (OUTPATIENT)
Dept: INTERNAL MEDICINE CLINIC | Facility: CLINIC | Age: 40
End: 2020-05-08

## 2020-05-08 DIAGNOSIS — F41.9 ANXIETY: ICD-10-CM

## 2020-05-08 DIAGNOSIS — M54.16 LUMBAR RADICULOPATHY, RIGHT: Primary | ICD-10-CM

## 2020-05-08 DIAGNOSIS — G47.09 OTHER INSOMNIA: ICD-10-CM

## 2020-05-08 PROCEDURE — 99214 OFFICE O/P EST MOD 30 MIN: CPT | Performed by: INTERNAL MEDICINE

## 2020-05-08 RX ORDER — METHYLPREDNISOLONE 4 MG/1
TABLET ORAL
Qty: 1 KIT | Refills: 0 | Status: SHIPPED | OUTPATIENT
Start: 2020-05-08 | End: 2020-07-14 | Stop reason: ALTCHOICE

## 2020-05-08 RX ORDER — ZOLPIDEM TARTRATE 10 MG/1
10 TABLET ORAL NIGHTLY PRN
Qty: 30 TABLET | Refills: 0 | Status: SHIPPED | OUTPATIENT
Start: 2020-05-08 | End: 2021-03-04

## 2020-05-09 NOTE — PROGRESS NOTES
Due to COVID-19 ACTION PLAN, the patient's office visit was converted to a phone or video visit. Patient understands and accepts financial responsibility for any deductible, co-insurance and/or co-pays associated with this service.   Time Spent: 23 min directed. Other - zolpidem as directed prn, pt has taken it before without side effects. Aware to take it sparingly  -     Zolpidem Tartrate 10 MG Oral Tab; Take 1 tablet (10 mg total) by mouth nightly as needed for Sleep.     Anxiety- related to COVID 1

## 2020-07-14 ENCOUNTER — OFFICE VISIT (OUTPATIENT)
Dept: OBGYN CLINIC | Facility: CLINIC | Age: 40
End: 2020-07-14
Payer: COMMERCIAL

## 2020-07-14 VITALS
HEART RATE: 68 BPM | DIASTOLIC BLOOD PRESSURE: 68 MMHG | HEIGHT: 61 IN | WEIGHT: 121.81 LBS | SYSTOLIC BLOOD PRESSURE: 112 MMHG | TEMPERATURE: 98 F | BODY MASS INDEX: 23 KG/M2

## 2020-07-14 DIAGNOSIS — Z01.419 WELL WOMAN EXAM WITH ROUTINE GYNECOLOGICAL EXAM: Primary | ICD-10-CM

## 2020-07-14 DIAGNOSIS — Z30.41 ENCOUNTER FOR SURVEILLANCE OF CONTRACEPTIVE PILLS: ICD-10-CM

## 2020-07-14 DIAGNOSIS — Z12.4 ENCOUNTER FOR SCREENING FOR CERVICAL CANCER: ICD-10-CM

## 2020-07-14 PROCEDURE — 3008F BODY MASS INDEX DOCD: CPT | Performed by: OBSTETRICS & GYNECOLOGY

## 2020-07-14 PROCEDURE — 87624 HPV HI-RISK TYP POOLED RSLT: CPT | Performed by: OBSTETRICS & GYNECOLOGY

## 2020-07-14 PROCEDURE — 3078F DIAST BP <80 MM HG: CPT | Performed by: OBSTETRICS & GYNECOLOGY

## 2020-07-14 PROCEDURE — 99396 PREV VISIT EST AGE 40-64: CPT | Performed by: OBSTETRICS & GYNECOLOGY

## 2020-07-14 PROCEDURE — 3074F SYST BP LT 130 MM HG: CPT | Performed by: OBSTETRICS & GYNECOLOGY

## 2020-07-14 RX ORDER — NORETHINDRONE ACETATE AND ETHINYL ESTRADIOL 1.5-30(21)
1 KIT ORAL DAILY
Qty: 3 PACKAGE | Refills: 3 | Status: ON HOLD | OUTPATIENT
Start: 2020-07-14 | End: 2021-03-31

## 2020-07-14 NOTE — PROGRESS NOTES
[facility]  Obstetrics and Gynecology  History & Physical    CC: Patient presents for a well woman exam     Subjective:     HPI: Prudence Lin is a 37year old  female here for a well women exam. Patient reports OCP since 2018.  She repor ([id number])                          11/15/2017  01/21/2019      Influenza             10/01/2019      TDAP                  11/15/2017      Tb Intradermal Test   08/16/2018      PSH:  Past Surgical History:   Procedure Laterality Date   • CHOLECYSTECTOMY Forced sexual activity: Not on file    Other Topics      Concerns:         Service: Not Asked        Blood Transfusions: Not Asked        Caffeine Concern: No          one cup of coffee        Occupational Exposure: Not Asked        Hobby Hazards: appearing  PERINEUM: no lesions  VAGINA: normal appearing vagina with normal color and discharge, no lesions  CERVIX: normal appearing cervix without discharge or lesions  UTERUS: uterus is normal size, shape, consistency and nontender  ADNEXA: normal adne

## 2020-07-16 LAB — HPV I/H RISK 1 DNA SPEC QL NAA+PROBE: NEGATIVE

## 2020-07-17 LAB
LAST PAP RESULT: NORMAL
PAP HISTORY (OTHER THAN LAST PAP): NORMAL

## 2020-08-10 RX ORDER — NORETHINDRONE ACETATE AND ETHINYL ESTRADIOL AND FERROUS FUMARATE 1MG-20(21)
KIT ORAL
Qty: 84 TABLET | Refills: 2 | OUTPATIENT
Start: 2020-08-10

## 2020-08-10 NOTE — TELEPHONE ENCOUNTER
Last OV: 7/14/20 with Dr. Mela Juarez for annual  Last refill date: 7/14/20  Follow-up: 1 year  Next appt.: none scheduled; due 7/2021      Patient changed from [facility] 1/20 to Junel Fe 1.5/30 at last office visit. Refill request is for the 1/20 strength.  Pa

## 2020-08-19 ENCOUNTER — TELEPHONE (OUTPATIENT)
Dept: OBGYN CLINIC | Facility: CLINIC | Age: 40
End: 2020-08-19

## 2020-08-19 NOTE — TELEPHONE ENCOUNTER
Patient called, you saw her for her annual on July 14th and she asked you about what you think about her having tubal done. Told pt to make an appt for consultation.  Pt wants to know if you can do phone or virtual visit or does she need to come back in to

## 2020-08-19 NOTE — TELEPHONE ENCOUNTER
I use this visit as opportunity to perform physical exam prior to surgery. I advise an in person office visit.

## 2020-09-01 ENCOUNTER — OFFICE VISIT (OUTPATIENT)
Dept: NEUROLOGY | Facility: CLINIC | Age: 40
End: 2020-09-01
Payer: COMMERCIAL

## 2020-09-01 VITALS
WEIGHT: 125 LBS | BODY MASS INDEX: 24 KG/M2 | HEART RATE: 78 BPM | RESPIRATION RATE: 16 BRPM | DIASTOLIC BLOOD PRESSURE: 66 MMHG | SYSTOLIC BLOOD PRESSURE: 112 MMHG

## 2020-09-01 DIAGNOSIS — M54.41 ACUTE RIGHT-SIDED LOW BACK PAIN WITH RIGHT-SIDED SCIATICA: ICD-10-CM

## 2020-09-01 DIAGNOSIS — M54.10 RADICULAR PAIN OF RIGHT LOWER EXTREMITY: Primary | ICD-10-CM

## 2020-09-01 DIAGNOSIS — M54.17 LUMBOSACRAL RADICULOPATHY: ICD-10-CM

## 2020-09-01 PROCEDURE — 99214 OFFICE O/P EST MOD 30 MIN: CPT | Performed by: OTHER

## 2020-09-01 PROCEDURE — 3078F DIAST BP <80 MM HG: CPT | Performed by: OTHER

## 2020-09-01 PROCEDURE — 3074F SYST BP LT 130 MM HG: CPT | Performed by: OTHER

## 2020-09-01 RX ORDER — NAPROXEN SODIUM 550 MG/1
550 TABLET ORAL 2 TIMES DAILY WITH MEALS
Qty: 60 TABLET | Refills: 3 | Status: SHIPPED | OUTPATIENT
Start: 2020-09-01 | End: 2021-04-12

## 2020-09-01 NOTE — PROGRESS NOTES
Patient reports pain below both buttocks and outer aspect of right knee. Numbness right foot for past 3 months off and on.

## 2020-09-01 NOTE — PROGRESS NOTES
[facility]   Neurology; follow up CLINIC VISIT  2020    Matias Kim Patient Status:  No patient class for patient encounter    1980 MRN [id number]   Location [facility], [address], [address] [facility] PCP Tin LAPAROSCOPIC CHOLECYSTECTOMY  07/18/2018    Dr Jaskaran Lopez   • LAPAROSCOPIC CHOLECYSTECTOMY N/A 7/18/2018    Performed by Ronnie Ricketts MD at [facility] MAIN OR       FAMILY HISTORY:  family history includes Cancer in her mother;  Other in her mother; Ovarian Cancer ( loss  ALLERGY/IMM.: denies food or seasonal allergies      PHYSICAL EXAMINATION:  VITAL SIGNS: /66 (BP Location: Right arm, Patient Position: Sitting, Cuff Size: adult)   Pulse 78   Resp 16   Wt 125 lb (56.7 kg)   LMP 06/30/2020 (Exact Date)   BMI 23 for nerve pain,            Problem List Items Addressed This Visit     Acute right-sided low back pain    Lumbosacral radiculopathy    Relevant Orders    MRI SPINE LUMBAR (CPT=72148)    ORTHOPEDIC - INTERNAL    Radicular pain of right lower extremity - Elvia

## 2020-09-12 ENCOUNTER — HOSPITAL ENCOUNTER (OUTPATIENT)
Dept: MRI IMAGING | Age: 40
Discharge: HOME | End: 2020-09-12
Attending: Other
Payer: COMMERCIAL

## 2020-09-12 DIAGNOSIS — M54.17 LUMBOSACRAL RADICULOPATHY: ICD-10-CM

## 2020-09-12 PROCEDURE — 72148 MRI LUMBAR SPINE W/O DYE: CPT | Performed by: OTHER

## 2020-09-14 ENCOUNTER — PATIENT MESSAGE (OUTPATIENT)
Dept: NEUROLOGY | Facility: CLINIC | Age: 40
End: 2020-09-14

## 2020-09-14 ENCOUNTER — TELEPHONE (OUTPATIENT)
Dept: NEUROLOGY | Facility: CLINIC | Age: 40
End: 2020-09-14

## 2020-09-15 NOTE — TELEPHONE ENCOUNTER
Spoke with patient about MRI results. Patient verbalized understanding and states will schedule Ortho dex't.

## 2020-09-15 NOTE — TELEPHONE ENCOUNTER
Let detailed message on confidential VM (ok per HIPAA) with below. Encouraged call back with any questions/concerns. Office hours and contact info provided.

## 2020-09-15 NOTE — TELEPHONE ENCOUNTER
From: Lucille Puente  To: Talha Chaudhary MD  Sent: 9/14/2020 5:31 PM CDT  Subject: Test Results Question    I have a question about MRI Scan Spine resulted on 9/12/20, 10:46 AM.  Can you give me a call when you have time. [phone]. Please and thank you.

## 2020-10-02 ENCOUNTER — OFFICE VISIT (OUTPATIENT)
Dept: ORTHOPEDICS CLINIC | Facility: CLINIC | Age: 40
End: 2020-10-02
Payer: COMMERCIAL

## 2020-10-02 VITALS — HEART RATE: 74 BPM | OXYGEN SATURATION: 100 %

## 2020-10-02 DIAGNOSIS — M53.3 SI (SACROILIAC) JOINT DYSFUNCTION: ICD-10-CM

## 2020-10-02 DIAGNOSIS — S76.311A STRAIN OF RIGHT HAMSTRING, INITIAL ENCOUNTER: Primary | ICD-10-CM

## 2020-10-02 PROCEDURE — 99203 OFFICE O/P NEW LOW 30 MIN: CPT | Performed by: FAMILY MEDICINE

## 2020-10-02 NOTE — PROGRESS NOTES
[facility] New Patient Note    CC: Patient presents with:   Other: lumbosacral radiculopathy      HPI: This 37year old female presents today with complaints of chronic right-sided low back pain and buttock pain that has been present and intermittent • Norethin Ace-Eth Estrad-FE (JUNEL FE 1.5/30) 1.5-30 MG-MCG Oral Tab Take 1 tablet by mouth daily. 3 Package 3   • Zolpidem Tartrate 10 MG Oral Tab Take 1 tablet (10 mg total) by mouth nightly as needed for Sleep.  30 tablet 0   • triamcinolone acetonide rash.  Psychiatric: Normal mood and affect. Musculoskeletal: Evaluation of patient's spine reveals mild tenderness palpation over the piriformis and SI joint on the right. She does have tenderness to the ischial tuberosity.   She has no significant pain

## 2020-10-03 ENCOUNTER — HOSPITAL ENCOUNTER (OUTPATIENT)
Dept: GENERAL RADIOLOGY | Age: 40
Discharge: HOME | End: 2020-10-03
Attending: FAMILY MEDICINE
Payer: COMMERCIAL

## 2020-10-03 DIAGNOSIS — M53.3 SI (SACROILIAC) JOINT DYSFUNCTION: ICD-10-CM

## 2020-10-03 PROCEDURE — 72202 X-RAY EXAM SI JOINTS 3/> VWS: CPT | Performed by: FAMILY MEDICINE

## 2020-10-07 ENCOUNTER — TELEPHONE (OUTPATIENT)
Dept: ORTHOPEDICS CLINIC | Facility: CLINIC | Age: 40
End: 2020-10-07

## 2020-10-07 NOTE — TELEPHONE ENCOUNTER
Spoke with patient and notified her the radiologist impressions of her recent x-rays. Pt notified if Dr. Loretta Garduno has any other impressions she will be notified when he is in the office. Pt verbalized understanding. No further questions at this time.

## 2020-10-07 NOTE — TELEPHONE ENCOUNTER
Patient is looking for test results.  Please call patient and then release them to [facility]

## 2020-11-25 ENCOUNTER — OFFICE VISIT (OUTPATIENT)
Dept: OBGYN CLINIC | Facility: CLINIC | Age: 40
End: 2020-11-25
Payer: COMMERCIAL

## 2020-11-25 VITALS
HEIGHT: 61 IN | DIASTOLIC BLOOD PRESSURE: 64 MMHG | BODY MASS INDEX: 23.75 KG/M2 | WEIGHT: 125.81 LBS | HEART RATE: 70 BPM | SYSTOLIC BLOOD PRESSURE: 116 MMHG

## 2020-11-25 DIAGNOSIS — Z30.09 STERILIZATION CONSULT: Primary | ICD-10-CM

## 2020-11-25 PROCEDURE — 3074F SYST BP LT 130 MM HG: CPT | Performed by: OBSTETRICS & GYNECOLOGY

## 2020-11-25 PROCEDURE — 99072 ADDL SUPL MATRL&STAF TM PHE: CPT | Performed by: OBSTETRICS & GYNECOLOGY

## 2020-11-25 PROCEDURE — 99213 OFFICE O/P EST LOW 20 MIN: CPT | Performed by: OBSTETRICS & GYNECOLOGY

## 2020-11-25 PROCEDURE — 3008F BODY MASS INDEX DOCD: CPT | Performed by: OBSTETRICS & GYNECOLOGY

## 2020-11-25 PROCEDURE — 3078F DIAST BP <80 MM HG: CPT | Performed by: OBSTETRICS & GYNECOLOGY

## 2020-11-25 NOTE — PROGRESS NOTES
GYN H&P     2020  3:03 PM    CC: Patient is here for tubal ligation consult    HPI: patient is a 37year old  here for her tubal ligation consult  Patient and her  are 100% sure they do not want more children.    She works from [employer] •  Norethin Ace-Eth Estrad-FE (JUNEL FE 1.5/30) 1.5-30 MG-MCG Oral Tab, Take 1 tablet by mouth daily. , Disp: 3 Package, Rfl: 3    •  Zolpidem Tartrate 10 MG Oral Tab, Take 1 tablet (10 mg total) by mouth nightly as needed for Sleep., Disp: 30 tablet, Rfl: 2. Laparoscopic surgery: I would recommend salpingectomy in her case, due to [facility] of ovarian cancer. We reviewed this is not 100% prevent ovarian cancer but decreases her risk.  We reviewed typical recovery time from surgery, need for someone to drive her ho

## 2020-12-14 ENCOUNTER — TELEPHONE (OUTPATIENT)
Dept: INTERNAL MEDICINE CLINIC | Facility: CLINIC | Age: 40
End: 2020-12-14

## 2020-12-14 DIAGNOSIS — Z13.29 SCREENING FOR THYROID DISORDER: ICD-10-CM

## 2020-12-14 DIAGNOSIS — Z13.228 SCREENING FOR METABOLIC DISORDER: ICD-10-CM

## 2020-12-14 DIAGNOSIS — Z13.0 SCREENING FOR DISORDER OF BLOOD AND BLOOD-FORMING ORGANS: ICD-10-CM

## 2020-12-14 DIAGNOSIS — Z13.220 SCREENING FOR LIPID DISORDERS: ICD-10-CM

## 2020-12-14 DIAGNOSIS — Z00.00 ROUTINE GENERAL MEDICAL EXAMINATION AT A HEALTH CARE FACILITY: Primary | ICD-10-CM

## 2020-12-14 NOTE — TELEPHONE ENCOUNTER
Future Appointments   Date Time Provider Beck Montalvo   1/18/2021  4:00 PM Rubi Hinson MD [facility] [address] [facility]     Orders to edward- Pt informed that labs need to be completed no sooner than 2 weeks prior to the appt.  Pt aware to fast-no call back

## 2020-12-16 NOTE — TELEPHONE ENCOUNTER
Labs sent per protocol    Future Appointments   Date Time Provider Beck Montalvo   1/14/2021  3:20 PM Irene Alpers, MD [facility] [facility]

## 2021-01-28 ENCOUNTER — TELEPHONE (OUTPATIENT)
Dept: OBGYN CLINIC | Facility: CLINIC | Age: 41
End: 2021-01-28

## 2021-01-28 DIAGNOSIS — Z12.31 ENCOUNTER FOR SCREENING MAMMOGRAM FOR MALIGNANT NEOPLASM OF BREAST: Primary | ICD-10-CM

## 2021-01-29 ENCOUNTER — TELEPHONE (OUTPATIENT)
Dept: OBGYN CLINIC | Facility: CLINIC | Age: 41
End: 2021-01-29

## 2021-01-29 DIAGNOSIS — Z30.2 REQUEST FOR STERILIZATION: Primary | ICD-10-CM

## 2021-02-03 NOTE — TELEPHONE ENCOUNTER
Spoke with patient and she is okay with going with another provider. Patient is available 3/29-4/2 routed to Dr. Shannan Dobbins to see whats next.

## 2021-02-04 NOTE — TELEPHONE ENCOUNTER
Surgery scheduled for 3/31/2021 at 9:30AM  Pre/post op   Future Appointments   Date Time Provider Beck Katia   3/4/2021  2:00 PM Tonya Julio MD [facility] [address]   3/10/2021  7:40 AM PF [facility] RM1 PF [facility] [place]   4/12/2021 10:00 AM Mary

## 2021-03-04 ENCOUNTER — TELEMEDICINE (OUTPATIENT)
Dept: OBGYN CLINIC | Facility: CLINIC | Age: 41
End: 2021-03-04
Payer: COMMERCIAL

## 2021-03-04 VITALS — HEIGHT: 61 IN | BODY MASS INDEX: 23.6 KG/M2 | WEIGHT: 125 LBS

## 2021-03-04 DIAGNOSIS — Z30.09 STERILIZATION CONSULT: Primary | ICD-10-CM

## 2021-03-04 PROCEDURE — 99213 OFFICE O/P EST LOW 20 MIN: CPT | Performed by: OBSTETRICS & GYNECOLOGY

## 2021-03-04 PROCEDURE — 3008F BODY MASS INDEX DOCD: CPT | Performed by: OBSTETRICS & GYNECOLOGY

## 2021-03-04 NOTE — PROGRESS NOTES
Virtual/Telephone     Cora Magaña verbally consents to a Virtual/Telephone visit service on 03/04/21. Patient understands and accepts financial responsibility for any deductible, co-insurance and/or co-pays associated with this service.     Duration of Social History:  Social History    Socioeconomic History      Marital status:       Spouse name: Not on file      Number of children: Not on file      Years of education: Not on file      Highest education level: Not on file    Occupational Hi Care: Not Asked        Exercise: Yes          3-4 x a week         Bike Helmet: Not Asked        Seat Belt: Yes        Self-Exams: Not Asked    Social History Narrative      Not on file        Family History:  Family History   Problem Relation Age of Onset the plan of care. All questions were answered to the best of my ability at this time.     I spent a total of 22 minutes doing the following:  Reviewing paper/electronic records  Obtaining a history  Evaluating the patient  Discussing treatment options  Cou

## 2021-03-04 NOTE — PATIENT INSTRUCTIONS
Pelvic Laparoscopy    Laparoscopy is a type of surgery done using very small incisions. This type of surgery is possible because of the laparoscope. This is a long, slender tool with a camera and light. It lets your surgeon see inside the stomach.  To do · You may have some discharge from the vagina. If so, ask the nurse for a pad. · You will be asked to walk around to improve breathing and blood flow. · If you had a catheter, it will most likely be removed before you go home.   · You can go home as soon © 5702-0241 [employer] [address]. All rights reserved. This information is not intended as a substitute for professional medical care. Always follow your healthcare professional's instructions.

## 2021-03-10 ENCOUNTER — HOSPITAL ENCOUNTER (OUTPATIENT)
Dept: MAMMOGRAPHY | Age: 41
Discharge: HOME | End: 2021-03-10
Attending: OBSTETRICS & GYNECOLOGY
Payer: COMMERCIAL

## 2021-03-10 DIAGNOSIS — Z12.31 ENCOUNTER FOR SCREENING MAMMOGRAM FOR MALIGNANT NEOPLASM OF BREAST: ICD-10-CM

## 2021-03-10 PROCEDURE — 77067 SCR MAMMO BI INCL CAD: CPT | Performed by: OBSTETRICS & GYNECOLOGY

## 2021-03-10 PROCEDURE — 77063 BREAST TOMOSYNTHESIS BI: CPT | Performed by: OBSTETRICS & GYNECOLOGY

## 2021-03-10 RX ORDER — MELATONIN 10 MG
1 CAPSULE ORAL EVERY EVENING
COMMUNITY

## 2021-03-19 ENCOUNTER — HOSPITAL ENCOUNTER (OUTPATIENT)
Dept: MAMMOGRAPHY | Age: 41
Discharge: HOME | End: 2021-03-19
Attending: OBSTETRICS & GYNECOLOGY
Payer: COMMERCIAL

## 2021-03-19 ENCOUNTER — TELEPHONE (OUTPATIENT)
Dept: OBGYN CLINIC | Facility: CLINIC | Age: 41
End: 2021-03-19

## 2021-03-19 ENCOUNTER — HOSPITAL ENCOUNTER (OUTPATIENT)
Dept: ULTRASOUND IMAGING | Age: 41
Discharge: HOME | End: 2021-03-19
Attending: OBSTETRICS & GYNECOLOGY
Payer: COMMERCIAL

## 2021-03-19 DIAGNOSIS — R92.2 INCONCLUSIVE MAMMOGRAM: ICD-10-CM

## 2021-03-19 PROCEDURE — 76642 ULTRASOUND BREAST LIMITED: CPT | Performed by: OBSTETRICS & GYNECOLOGY

## 2021-03-19 PROCEDURE — 77061 BREAST TOMOSYNTHESIS UNI: CPT | Performed by: OBSTETRICS & GYNECOLOGY

## 2021-03-19 PROCEDURE — 77065 DX MAMMO INCL CAD UNI: CPT | Performed by: OBSTETRICS & GYNECOLOGY

## 2021-03-19 NOTE — TELEPHONE ENCOUNTER
Diagnostic mammogram completed today. Recommendation made for MRI to further investigate area of focal asymmetry. Routed to Dr. Luigi Raman for review and order.

## 2021-03-19 NOTE — TELEPHONE ENCOUNTER
No need to call Dr Rea Sees he wanted to let Dr. Avila Benltey know that he recommends an MRI for the patient

## 2021-03-26 ENCOUNTER — HOSPITAL ENCOUNTER (OUTPATIENT)
Dept: MRI IMAGING | Age: 41
Discharge: HOME | End: 2021-03-26
Attending: OBSTETRICS & GYNECOLOGY
Payer: COMMERCIAL

## 2021-03-26 DIAGNOSIS — R92.8 ABNORMAL MAMMOGRAM: ICD-10-CM

## 2021-03-26 PROCEDURE — A9575 INJ GADOTERATE MEGLUMI 0.1ML: HCPCS | Performed by: OBSTETRICS & GYNECOLOGY

## 2021-03-26 PROCEDURE — 77049 MRI BREAST C-+ W/CAD BI: CPT | Performed by: OBSTETRICS & GYNECOLOGY

## 2021-03-29 ENCOUNTER — LAB ENCOUNTER (OUTPATIENT)
Dept: LAB | Age: 41
End: 2021-03-29
Attending: OBSTETRICS & GYNECOLOGY
Payer: COMMERCIAL

## 2021-03-29 DIAGNOSIS — Z30.2 REQUEST FOR STERILIZATION: ICD-10-CM

## 2021-03-30 ENCOUNTER — ANESTHESIA EVENT (OUTPATIENT)
Dept: SURGERY | Facility: HOSPITAL | Age: 41
End: 2021-03-30
Payer: COMMERCIAL

## 2021-03-30 NOTE — ANESTHESIA PREPROCEDURE EVALUATION
PRE-OP EVALUATION    Patient Name: Sandy Beltrán    Admit Diagnosis: Request for sterilization [Z30.2]    Pre-op Diagnosis: Request for sterilization [Z30.2]    laparoscopic bilateral salpingectomy    Anesthesia Procedure: laparoscopic bilateral salpinge Post-procedure pain management plan discussed with surgeon and patient.       Plan/risks discussed with: patient                Present on Admission:  **None**

## 2021-03-30 NOTE — H&P
[facility]  Obstetrics and Gynecology  Gynecologic History & Physical    Lakisha Duong Patient Status:  Hospital Outpatient Surgery    1980 MRN [id number]   [facility] SURGERY Attending Delbert Macdonald MD   Hospital Day 0 [facility] Normocephalic without obvious deformity, atraumatic  Neck: No thyromegaly, supple, non-tender, no masses, no adenopathy  Lungs: Clear to auscultation bilaterally, no rales, wheezes or rhonchi  Heart[de-identified] Regular rate and rhythm, no gallops or murmurs  Abdomen

## 2021-03-31 ENCOUNTER — HOSPITAL ENCOUNTER (OUTPATIENT)
Facility: HOSPITAL | Age: 41
Setting detail: HOSPITAL OUTPATIENT SURGERY
Discharge: HOME | End: 2021-03-31
Attending: OBSTETRICS & GYNECOLOGY | Admitting: OBSTETRICS & GYNECOLOGY
Payer: COMMERCIAL

## 2021-03-31 ENCOUNTER — ANESTHESIA (OUTPATIENT)
Dept: SURGERY | Facility: HOSPITAL | Age: 41
End: 2021-03-31
Payer: COMMERCIAL

## 2021-03-31 VITALS
TEMPERATURE: 98 F | HEIGHT: 61 IN | HEART RATE: 80 BPM | WEIGHT: 125.44 LBS | BODY MASS INDEX: 23.68 KG/M2 | DIASTOLIC BLOOD PRESSURE: 71 MMHG | SYSTOLIC BLOOD PRESSURE: 106 MMHG | RESPIRATION RATE: 18 BRPM | OXYGEN SATURATION: 100 %

## 2021-03-31 DIAGNOSIS — Z30.2 REQUEST FOR STERILIZATION: Primary | ICD-10-CM

## 2021-03-31 PROCEDURE — 0UB74ZZ EXCISION OF BILATERAL FALLOPIAN TUBES, PERCUTANEOUS ENDOSCOPIC APPROACH: ICD-10-PCS | Performed by: OBSTETRICS & GYNECOLOGY

## 2021-03-31 PROCEDURE — 58661 LAPAROSCOPY REMOVE ADNEXA: CPT | Performed by: OBSTETRICS & GYNECOLOGY

## 2021-03-31 RX ORDER — ONDANSETRON 2 MG/ML
INJECTION INTRAMUSCULAR; INTRAVENOUS AS NEEDED
Status: DISCONTINUED | OUTPATIENT
Start: 2021-03-31 | End: 2021-03-31 | Stop reason: SURG

## 2021-03-31 RX ORDER — MEPERIDINE HYDROCHLORIDE 25 MG/ML
12.5 INJECTION INTRAMUSCULAR; INTRAVENOUS; SUBCUTANEOUS AS NEEDED
Status: DISCONTINUED | OUTPATIENT
Start: 2021-03-31 | End: 2021-03-31

## 2021-03-31 RX ORDER — HYDROCODONE BITARTRATE AND ACETAMINOPHEN 5; 325 MG/1; MG/1
1 TABLET ORAL AS NEEDED
Status: COMPLETED | OUTPATIENT
Start: 2021-03-31 | End: 2021-03-31

## 2021-03-31 RX ORDER — KETOROLAC TROMETHAMINE 30 MG/ML
INJECTION, SOLUTION INTRAMUSCULAR; INTRAVENOUS AS NEEDED
Status: DISCONTINUED | OUTPATIENT
Start: 2021-03-31 | End: 2021-03-31 | Stop reason: SURG

## 2021-03-31 RX ORDER — MIDAZOLAM HYDROCHLORIDE 1 MG/ML
1 INJECTION INTRAMUSCULAR; INTRAVENOUS EVERY 5 MIN PRN
Status: DISCONTINUED | OUTPATIENT
Start: 2021-03-31 | End: 2021-03-31

## 2021-03-31 RX ORDER — DIPHENHYDRAMINE HYDROCHLORIDE 50 MG/ML
12.5 INJECTION INTRAMUSCULAR; INTRAVENOUS AS NEEDED
Status: DISCONTINUED | OUTPATIENT
Start: 2021-03-31 | End: 2021-03-31

## 2021-03-31 RX ORDER — SODIUM CHLORIDE, SODIUM LACTATE, POTASSIUM CHLORIDE, CALCIUM CHLORIDE 600; 310; 30; 20 MG/100ML; MG/100ML; MG/100ML; MG/100ML
INJECTION, SOLUTION INTRAVENOUS CONTINUOUS
Status: DISCONTINUED | OUTPATIENT
Start: 2021-03-31 | End: 2021-03-31

## 2021-03-31 RX ORDER — HYDROMORPHONE HYDROCHLORIDE 1 MG/ML
INJECTION, SOLUTION INTRAMUSCULAR; INTRAVENOUS; SUBCUTANEOUS
Status: COMPLETED
Start: 2021-03-31 | End: 2021-03-31

## 2021-03-31 RX ORDER — HYDROCODONE BITARTRATE AND ACETAMINOPHEN 5; 325 MG/1; MG/1
1 TABLET ORAL EVERY 6 HOURS PRN
Qty: 5 TABLET | Refills: 0 | Status: SHIPPED | OUTPATIENT
Start: 2021-03-31 | End: 2021-04-12

## 2021-03-31 RX ORDER — NALOXONE HYDROCHLORIDE 0.4 MG/ML
80 INJECTION, SOLUTION INTRAMUSCULAR; INTRAVENOUS; SUBCUTANEOUS AS NEEDED
Status: DISCONTINUED | OUTPATIENT
Start: 2021-03-31 | End: 2021-03-31

## 2021-03-31 RX ORDER — LIDOCAINE HYDROCHLORIDE 10 MG/ML
INJECTION, SOLUTION EPIDURAL; INFILTRATION; INTRACAUDAL; PERINEURAL AS NEEDED
Status: DISCONTINUED | OUTPATIENT
Start: 2021-03-31 | End: 2021-03-31 | Stop reason: SURG

## 2021-03-31 RX ORDER — PHENYLEPHRINE HCL 10 MG/ML
VIAL (ML) INJECTION AS NEEDED
Status: DISCONTINUED | OUTPATIENT
Start: 2021-03-31 | End: 2021-03-31 | Stop reason: SURG

## 2021-03-31 RX ORDER — METOCLOPRAMIDE HYDROCHLORIDE 5 MG/ML
10 INJECTION INTRAMUSCULAR; INTRAVENOUS AS NEEDED
Status: DISCONTINUED | OUTPATIENT
Start: 2021-03-31 | End: 2021-03-31

## 2021-03-31 RX ORDER — IBUPROFEN 600 MG/1
600 TABLET ORAL EVERY 6 HOURS PRN
Qty: 20 TABLET | Refills: 0 | Status: SHIPPED | OUTPATIENT
Start: 2021-03-31 | End: 2021-04-12

## 2021-03-31 RX ORDER — ACETAMINOPHEN 500 MG
1000 TABLET ORAL ONCE
Status: DISCONTINUED | OUTPATIENT
Start: 2021-03-31 | End: 2021-03-31

## 2021-03-31 RX ORDER — ACETAMINOPHEN 500 MG
1000 TABLET ORAL EVERY 6 HOURS PRN
COMMUNITY
End: 2021-04-12

## 2021-03-31 RX ORDER — HYDROCODONE BITARTRATE AND ACETAMINOPHEN 5; 325 MG/1; MG/1
2 TABLET ORAL AS NEEDED
Status: COMPLETED | OUTPATIENT
Start: 2021-03-31 | End: 2021-03-31

## 2021-03-31 RX ORDER — HYDROMORPHONE HYDROCHLORIDE 1 MG/ML
0.4 INJECTION, SOLUTION INTRAMUSCULAR; INTRAVENOUS; SUBCUTANEOUS EVERY 5 MIN PRN
Status: DISCONTINUED | OUTPATIENT
Start: 2021-03-31 | End: 2021-03-31

## 2021-03-31 RX ORDER — ONDANSETRON 2 MG/ML
4 INJECTION INTRAMUSCULAR; INTRAVENOUS AS NEEDED
Status: DISCONTINUED | OUTPATIENT
Start: 2021-03-31 | End: 2021-03-31

## 2021-03-31 RX ORDER — ACETAMINOPHEN 500 MG
1000 TABLET ORAL ONCE AS NEEDED
Status: DISCONTINUED | OUTPATIENT
Start: 2021-03-31 | End: 2021-03-31

## 2021-03-31 RX ORDER — BUPIVACAINE HYDROCHLORIDE 5 MG/ML
INJECTION, SOLUTION EPIDURAL; INTRACAUDAL AS NEEDED
Status: DISCONTINUED | OUTPATIENT
Start: 2021-03-31 | End: 2021-03-31 | Stop reason: HOSPADM

## 2021-03-31 RX ORDER — ROCURONIUM BROMIDE 10 MG/ML
INJECTION, SOLUTION INTRAVENOUS AS NEEDED
Status: DISCONTINUED | OUTPATIENT
Start: 2021-03-31 | End: 2021-03-31 | Stop reason: SURG

## 2021-03-31 RX ORDER — DEXAMETHASONE SODIUM PHOSPHATE 4 MG/ML
VIAL (ML) INJECTION AS NEEDED
Status: DISCONTINUED | OUTPATIENT
Start: 2021-03-31 | End: 2021-03-31 | Stop reason: SURG

## 2021-03-31 RX ORDER — MIDAZOLAM HYDROCHLORIDE 1 MG/ML
INJECTION INTRAMUSCULAR; INTRAVENOUS AS NEEDED
Status: DISCONTINUED | OUTPATIENT
Start: 2021-03-31 | End: 2021-03-31 | Stop reason: SURG

## 2021-03-31 RX ADMIN — ROCURONIUM BROMIDE 35 MG: 10 INJECTION, SOLUTION INTRAVENOUS at 09:55:00

## 2021-03-31 RX ADMIN — SODIUM CHLORIDE, SODIUM LACTATE, POTASSIUM CHLORIDE, CALCIUM CHLORIDE: 600; 310; 30; 20 INJECTION, SOLUTION INTRAVENOUS at 09:50:00

## 2021-03-31 RX ADMIN — KETOROLAC TROMETHAMINE 30 MG: 30 INJECTION, SOLUTION INTRAMUSCULAR; INTRAVENOUS at 10:48:00

## 2021-03-31 RX ADMIN — DEXAMETHASONE SODIUM PHOSPHATE 8 MG: 4 MG/ML VIAL (ML) INJECTION at 10:00:00

## 2021-03-31 RX ADMIN — MIDAZOLAM HYDROCHLORIDE 2 MG: 1 INJECTION INTRAMUSCULAR; INTRAVENOUS at 09:52:00

## 2021-03-31 RX ADMIN — LIDOCAINE HYDROCHLORIDE 50 MG: 10 INJECTION, SOLUTION EPIDURAL; INFILTRATION; INTRACAUDAL; PERINEURAL at 09:55:00

## 2021-03-31 RX ADMIN — ONDANSETRON 4 MG: 2 INJECTION INTRAMUSCULAR; INTRAVENOUS at 10:48:00

## 2021-03-31 RX ADMIN — SODIUM CHLORIDE, SODIUM LACTATE, POTASSIUM CHLORIDE, CALCIUM CHLORIDE: 600; 310; 30; 20 INJECTION, SOLUTION INTRAVENOUS at 10:55:00

## 2021-03-31 RX ADMIN — PHENYLEPHRINE HCL 150 MCG: 10 MG/ML VIAL (ML) INJECTION at 10:05:00

## 2021-03-31 NOTE — INTERVAL H&P NOTE
Pre-op Diagnosis: Request for sterilization [Z30.2]    The above referenced H&P was reviewed by Shanti Espinosa MD on 3/31/2021, the patient was examined and no significant changes have occurred in the patient's condition since the H&P was performed.   I disc

## 2021-03-31 NOTE — ANESTHESIA PROCEDURE NOTES
Airway  Urgency: elective      General Information and Staff    Patient location during procedure: OR  Anesthesiologist: Laura Julien MD  Resident/CRNA: Maciel Ferguson CRNA  Performed: CRNA     Indications and Patient Condition  Indications for a

## 2021-03-31 NOTE — OPERATIVE REPORT
Pre-Operative Diagnosis: Request for sterilization [Z30.2]     Post-Operative Diagnosis: Request for sterilization [Z30.2]      Procedure Performed:   Laparoscopic bilateral salpingectomy    Surgeon(s) and Role:     * Yasmany Gagnon MD - Primary    Assista laparoscope. Additional disposable 5 mm bladeless laparoscopic ports were placed in the left and right lower quadrants without complications under direct visualization with the laparoscope.  All three incisions were infiltrated with 0.5% Marcaine solution

## 2021-03-31 NOTE — ANESTHESIA POSTPROCEDURE EVALUATION
[address] [place]  Patient Status:  Hospital Outpatient Surgery   Age/Gender 41year old female MRN [id number]   Location [facility] Attending Ria Santos MD   Hosp Day # 0 PCP Prosper Patel MD       Anesth

## 2021-04-01 ENCOUNTER — MED REC SCAN ONLY (OUTPATIENT)
Dept: OBGYN CLINIC | Facility: CLINIC | Age: 41
End: 2021-04-01

## 2021-04-12 ENCOUNTER — OFFICE VISIT (OUTPATIENT)
Dept: OBGYN CLINIC | Facility: CLINIC | Age: 41
End: 2021-04-12
Payer: COMMERCIAL

## 2021-04-12 VITALS
SYSTOLIC BLOOD PRESSURE: 104 MMHG | BODY MASS INDEX: 23.67 KG/M2 | HEIGHT: 61 IN | WEIGHT: 125.38 LBS | HEART RATE: 86 BPM | DIASTOLIC BLOOD PRESSURE: 60 MMHG

## 2021-04-12 DIAGNOSIS — Z98.890 POST-OPERATIVE STATE: Primary | ICD-10-CM

## 2021-04-12 PROCEDURE — 3078F DIAST BP <80 MM HG: CPT | Performed by: OBSTETRICS & GYNECOLOGY

## 2021-04-12 PROCEDURE — 99024 POSTOP FOLLOW-UP VISIT: CPT | Performed by: OBSTETRICS & GYNECOLOGY

## 2021-04-12 PROCEDURE — 3074F SYST BP LT 130 MM HG: CPT | Performed by: OBSTETRICS & GYNECOLOGY

## 2021-04-12 PROCEDURE — 3008F BODY MASS INDEX DOCD: CPT | Performed by: OBSTETRICS & GYNECOLOGY

## 2021-04-12 NOTE — PROGRESS NOTES
Subjective:  Patient presents with: Other: Post op visit 2 weeks    Patient presents 2 weeks status post laparoscopy bilateral salpingectomy. Currently without complaints. Tolerating general diet. Regular bowel movements. No urinary complaints.  Pain w

## 2021-04-21 ENCOUNTER — HOSPITAL ENCOUNTER (EMERGENCY)
Age: 41
Discharge: HOME | End: 2021-04-21
Attending: EMERGENCY MEDICINE
Payer: COMMERCIAL

## 2021-04-21 VITALS
HEIGHT: 61 IN | WEIGHT: 125 LBS | SYSTOLIC BLOOD PRESSURE: 135 MMHG | BODY MASS INDEX: 23.6 KG/M2 | TEMPERATURE: 98 F | RESPIRATION RATE: 20 BRPM | DIASTOLIC BLOOD PRESSURE: 94 MMHG | HEART RATE: 85 BPM | OXYGEN SATURATION: 99 %

## 2021-04-21 DIAGNOSIS — N39.0 URINARY TRACT INFECTION WITH HEMATURIA, SITE UNSPECIFIED: Primary | ICD-10-CM

## 2021-04-21 DIAGNOSIS — R31.9 URINARY TRACT INFECTION WITH HEMATURIA, SITE UNSPECIFIED: Primary | ICD-10-CM

## 2021-04-21 PROCEDURE — 87186 SC STD MICRODIL/AGAR DIL: CPT | Performed by: EMERGENCY MEDICINE

## 2021-04-21 PROCEDURE — 87086 URINE CULTURE/COLONY COUNT: CPT | Performed by: EMERGENCY MEDICINE

## 2021-04-21 PROCEDURE — 99283 EMERGENCY DEPT VISIT LOW MDM: CPT

## 2021-04-21 PROCEDURE — 87077 CULTURE AEROBIC IDENTIFY: CPT | Performed by: EMERGENCY MEDICINE

## 2021-04-21 PROCEDURE — 81015 MICROSCOPIC EXAM OF URINE: CPT | Performed by: EMERGENCY MEDICINE

## 2021-04-21 PROCEDURE — 81001 URINALYSIS AUTO W/SCOPE: CPT | Performed by: EMERGENCY MEDICINE

## 2021-04-21 RX ORDER — CIPROFLOXACIN 500 MG/1
500 TABLET, FILM COATED ORAL 2 TIMES DAILY
Qty: 14 TABLET | Refills: 0 | Status: SHIPPED | OUTPATIENT
Start: 2021-04-21 | End: 2021-04-28

## 2021-04-21 RX ORDER — PHENAZOPYRIDINE HYDROCHLORIDE 200 MG/1
200 TABLET, FILM COATED ORAL 3 TIMES DAILY PRN
Qty: 6 TABLET | Refills: 0 | Status: SHIPPED | OUTPATIENT
Start: 2021-04-21 | End: 2021-04-28

## 2021-04-22 NOTE — ED PROVIDER NOTES
Patient Seen in: THE [facility] Emergency Department In [place]      History   Patient presents with:  Urinary Symptoms    Stated Complaint: painful urination    HPI/Subjective:   HPI    39-year-old female presents reporting sharp vaginal pains with urination BMI 23.62 kg/m²         Physical Exam    General:  Vitals as listed. No acute distress   HEENT: Sclerae anicteric. Conjunctivae show no pallor.   Oropharynx clear, mucous membranes moist   Neck: supple, no rigidity   Lungs: good air exchange and clear   H hematuria, site unspecified  (primary encounter diagnosis)     Disposition:  Discharge  4/21/2021 11:20 pm    Follow-up:  Leobardo Ash MD  [address] [place] [address]  [phone]    Schedule an appointment as soon as possible fo

## 2021-04-23 ENCOUNTER — OFFICE VISIT (OUTPATIENT)
Dept: INTERNAL MEDICINE CLINIC | Facility: CLINIC | Age: 41
End: 2021-04-23
Payer: COMMERCIAL

## 2021-04-23 ENCOUNTER — LAB ENCOUNTER (OUTPATIENT)
Dept: LAB | Age: 41
End: 2021-04-23
Attending: INTERNAL MEDICINE
Payer: COMMERCIAL

## 2021-04-23 VITALS
RESPIRATION RATE: 16 BRPM | TEMPERATURE: 98 F | HEART RATE: 72 BPM | SYSTOLIC BLOOD PRESSURE: 98 MMHG | HEIGHT: 61 IN | WEIGHT: 123 LBS | BODY MASS INDEX: 23.22 KG/M2 | DIASTOLIC BLOOD PRESSURE: 74 MMHG

## 2021-04-23 DIAGNOSIS — F51.02 ADJUSTMENT INSOMNIA: ICD-10-CM

## 2021-04-23 DIAGNOSIS — Z13.220 SCREENING FOR LIPID DISORDERS: ICD-10-CM

## 2021-04-23 DIAGNOSIS — Z13.29 SCREENING FOR THYROID DISORDER: ICD-10-CM

## 2021-04-23 DIAGNOSIS — R31.0 GROSS HEMATURIA: Primary | ICD-10-CM

## 2021-04-23 DIAGNOSIS — Z13.228 SCREENING FOR METABOLIC DISORDER: ICD-10-CM

## 2021-04-23 DIAGNOSIS — Z00.00 ROUTINE GENERAL MEDICAL EXAMINATION AT A HEALTH CARE FACILITY: ICD-10-CM

## 2021-04-23 DIAGNOSIS — M79.604 RIGHT LEG PAIN: ICD-10-CM

## 2021-04-23 PROCEDURE — 36415 COLL VENOUS BLD VENIPUNCTURE: CPT

## 2021-04-23 PROCEDURE — 80053 COMPREHEN METABOLIC PANEL: CPT

## 2021-04-23 PROCEDURE — 99214 OFFICE O/P EST MOD 30 MIN: CPT | Performed by: INTERNAL MEDICINE

## 2021-04-23 PROCEDURE — 3074F SYST BP LT 130 MM HG: CPT | Performed by: INTERNAL MEDICINE

## 2021-04-23 PROCEDURE — 80061 LIPID PANEL: CPT

## 2021-04-23 PROCEDURE — 3078F DIAST BP <80 MM HG: CPT | Performed by: INTERNAL MEDICINE

## 2021-04-23 PROCEDURE — 84443 ASSAY THYROID STIM HORMONE: CPT

## 2021-04-23 PROCEDURE — 3008F BODY MASS INDEX DOCD: CPT | Performed by: INTERNAL MEDICINE

## 2021-04-23 RX ORDER — METHYLPREDNISOLONE 4 MG/1
TABLET ORAL
Qty: 1 KIT | Refills: 0 | Status: SHIPPED | OUTPATIENT
Start: 2021-04-23 | End: 2021-07-09 | Stop reason: ALTCHOICE

## 2021-04-23 NOTE — PROGRESS NOTES
Stephanie Elmore is a 41year old female. Patient presents with: Follow - Up: SN RM 3; [facility] 4/21 UTI, improved symptoms.  still with leg pain, sleep is better      HPI:     Patient here for f/u-  C/o gross hematuria and dysuria that prompted her to go Smoker      Smokeless tobacco: Never Used    Vaping Use      Vaping Use: Never used    Alcohol use:  Yes      Alcohol/week: 0.0 standard drinks      Comment: cage done 1/31/2020    Drug use: No    Family History   Problem Relation Age of Onset   • Cancer Mo this Visit:  Requested Prescriptions     Signed Prescriptions Disp Refills   • methylPREDNISolone (MEDROL) 4 MG Oral Tablet Therapy Pack 1 kit 0     Sig: As directed. Imaging & Consults:  None    Return if symptoms worsen or fail to improve.   There a

## 2021-04-30 ENCOUNTER — OFFICE VISIT (OUTPATIENT)
Dept: INTERNAL MEDICINE CLINIC | Facility: CLINIC | Age: 41
End: 2021-04-30
Payer: COMMERCIAL

## 2021-04-30 VITALS
TEMPERATURE: 97 F | RESPIRATION RATE: 16 BRPM | HEART RATE: 76 BPM | BODY MASS INDEX: 22.84 KG/M2 | DIASTOLIC BLOOD PRESSURE: 64 MMHG | WEIGHT: 121 LBS | SYSTOLIC BLOOD PRESSURE: 98 MMHG | HEIGHT: 61 IN

## 2021-04-30 DIAGNOSIS — Z00.00 ROUTINE GENERAL MEDICAL EXAMINATION AT A HEALTH CARE FACILITY: Primary | ICD-10-CM

## 2021-04-30 DIAGNOSIS — D17.1 LIPOMA OF TORSO: ICD-10-CM

## 2021-04-30 PROBLEM — Z11.1 SCREENING FOR TUBERCULOSIS: Status: RESOLVED | Noted: 2021-04-30 | Resolved: 2021-04-30

## 2021-04-30 PROBLEM — Z11.1 SCREENING FOR TUBERCULOSIS: Status: ACTIVE | Noted: 2021-04-30

## 2021-04-30 PROCEDURE — 3074F SYST BP LT 130 MM HG: CPT | Performed by: INTERNAL MEDICINE

## 2021-04-30 PROCEDURE — 3078F DIAST BP <80 MM HG: CPT | Performed by: INTERNAL MEDICINE

## 2021-04-30 PROCEDURE — 99396 PREV VISIT EST AGE 40-64: CPT | Performed by: INTERNAL MEDICINE

## 2021-04-30 PROCEDURE — 3008F BODY MASS INDEX DOCD: CPT | Performed by: INTERNAL MEDICINE

## 2021-04-30 NOTE — PROGRESS NOTES
Patient presents with:  Physical: SN Rm 4      HPI:    Patient here for CPE, sees gyne. utd on pap and mammogram  UTI resolved with cipro, no  complaints  She is , needs forms filled out today.   CPE labs reviewed with patient and WNL  Lipoma ri Smokeless tobacco: Never Used    Vaping Use      Vaping Use: Never used    Alcohol use:  Yes      Alcohol/week: 0.0 standard drinks      Comment: cage done 1/31/2020    Drug use: No      Current Outpatient Medications   Medication Sig Dispense Refill   • Me range of motion. No edema and no tenderness. No effusions. Lymphadenopathy: No cervical adenopathy. Neurological: Normal reflexes. No cranial nerve deficit or sensory deficit. Normal muscle tone. Coordination normal.   Skin: Skin is warm and dry.  No ra

## 2021-07-09 ENCOUNTER — OFFICE VISIT (OUTPATIENT)
Dept: INTERNAL MEDICINE CLINIC | Facility: CLINIC | Age: 41
End: 2021-07-09
Payer: COMMERCIAL

## 2021-07-09 VITALS
DIASTOLIC BLOOD PRESSURE: 62 MMHG | TEMPERATURE: 97 F | HEART RATE: 68 BPM | SYSTOLIC BLOOD PRESSURE: 98 MMHG | RESPIRATION RATE: 16 BRPM | BODY MASS INDEX: 22.84 KG/M2 | HEIGHT: 61 IN | WEIGHT: 121 LBS

## 2021-07-09 DIAGNOSIS — M53.3 SI (SACROILIAC) JOINT DYSFUNCTION: Primary | ICD-10-CM

## 2021-07-09 DIAGNOSIS — M79.18 RIGHT BUTTOCK PAIN: ICD-10-CM

## 2021-07-09 PROCEDURE — 99213 OFFICE O/P EST LOW 20 MIN: CPT | Performed by: INTERNAL MEDICINE

## 2021-07-09 PROCEDURE — 3008F BODY MASS INDEX DOCD: CPT | Performed by: INTERNAL MEDICINE

## 2021-07-09 PROCEDURE — 3078F DIAST BP <80 MM HG: CPT | Performed by: INTERNAL MEDICINE

## 2021-07-09 PROCEDURE — 3074F SYST BP LT 130 MM HG: CPT | Performed by: INTERNAL MEDICINE

## 2021-07-09 NOTE — PROGRESS NOTES
Padmini Mccarthy is a 41year old female. Patient presents with:  Back Pain: SN Rm 4; R side sciatic pain, ongoing, used Rx steroid w/ no relief      HPI:     Patient c/o intermittent right low back, buttock and upper leg pain since 2018.  Started when she cough  CARDIOVASCULAR: denies chest pain  GI: denies abdominal pain  MS: as above      EXAM:   BP 98/62 (BP Location: Left arm, Patient Position: Sitting, Cuff Size: adult)   Pulse 68   Temp 97.3 °F (36.3 °C) (Temporal)   Resp 16   Ht 5' 1\" (1.549 m)   Wt

## 2021-07-23 ENCOUNTER — OFFICE VISIT (OUTPATIENT)
Dept: PAIN CLINIC | Facility: CLINIC | Age: 41
End: 2021-07-23
Payer: COMMERCIAL

## 2021-07-23 VITALS
OXYGEN SATURATION: 98 % | BODY MASS INDEX: 23 KG/M2 | HEART RATE: 68 BPM | WEIGHT: 121 LBS | SYSTOLIC BLOOD PRESSURE: 102 MMHG | DIASTOLIC BLOOD PRESSURE: 66 MMHG

## 2021-07-23 DIAGNOSIS — G57.01 PIRIFORMIS SYNDROME OF RIGHT SIDE: Primary | ICD-10-CM

## 2021-07-23 PROCEDURE — 3078F DIAST BP <80 MM HG: CPT | Performed by: ANESTHESIOLOGY

## 2021-07-23 PROCEDURE — 3074F SYST BP LT 130 MM HG: CPT | Performed by: ANESTHESIOLOGY

## 2021-07-23 PROCEDURE — 99243 OFF/OP CNSLTJ NEW/EST LOW 30: CPT | Performed by: ANESTHESIOLOGY

## 2021-07-23 NOTE — H&P
Name: Shanae Cheung   : 1980   DOS: 2021     Chief complaint: Right buttock pain    History of present illness:  Shanae Cheung is a 41year old female referred for evaluation of right-sided gluteal pain which began during her pregnancy.   Surekha Bauer The patient is awake, alert, oriented and corporative. She has a normal affect. The patient ambulates with normal gait. HEENT: No gross lesion noted. PEERL. No icterus. Neck and Upper Extremity: Supple. No thyromegaly or lymphadenopathy.   Motor Lennice Daft Management

## 2021-07-23 NOTE — PROGRESS NOTES
HPI/Subjective:   Patient ID: Farzad Fatima is a 41year old female. HPI    History/Other:   Review of Systems  Current Outpatient Medications   Medication Sig Dispense Refill   • Melatonin 10 MG Oral Cap Take 1 capsule by mouth every evening.      • c

## 2021-07-27 ENCOUNTER — TELEPHONE (OUTPATIENT)
Dept: NEUROLOGY | Facility: CLINIC | Age: 41
End: 2021-07-27

## 2021-07-27 DIAGNOSIS — G57.01 PIRIFORMIS SYNDROME OF RIGHT SIDE: Primary | ICD-10-CM

## 2021-07-27 NOTE — TELEPHONE ENCOUNTER
Prior authorization request completed for: Right Piriformis Injection   Authorization #No Prior Authorization/Nor Pre Determination is Required   Spoke with Alejos Meigs at [address] [place] [address] [phone]  Reference #:[id number]    Time:13:19    Authorization dates: N/

## 2021-07-27 NOTE — TELEPHONE ENCOUNTER
Question Answer Comment   Anesthesia Type Local    Provider Dany Lopez    [facility]    Medical clearance requested (will send to Pain Navigator) No    Patient has Medicare coverage?  No    Comments (Please list entire procedure name here.) right piriformis inj

## 2021-07-27 NOTE — TELEPHONE ENCOUNTER
Patient advised of insurance approval to proceed with injections and is agreeable to scheduling. Patient scheduled for procedure, pre-procedure instructions reviewed. Patient prefers local sedation.  Reviewed sedation instructions including no need to fast • Agrylin (Anagrelide) 10 days  • Brilinta (Ticagrelor) 7 days  • Imbruvica (Ibrutinib) 3 days   • Enbrel (Etanercept) 24 hours   • Fragmin (Dalteparin) 24 hours   • Pletal (Cilostazol) 7 days  • Effient (Prasugrel) 7 days  • Pradaxa 10 days  • Trental 7 d increase the frequency of your glucose monitoring after the procedure as this may cause a temporary increase in your blood sugar. Contact your primary care physician if your blood sugar rises as you may require some medication adjustment.   It is normal to

## 2021-08-05 ENCOUNTER — TELEPHONE (OUTPATIENT)
Dept: PHYSICAL THERAPY | Facility: HOSPITAL | Age: 41
End: 2021-08-05

## 2021-08-09 ENCOUNTER — TELEPHONE (OUTPATIENT)
Dept: PAIN CLINIC | Facility: CLINIC | Age: 41
End: 2021-08-09

## 2021-08-09 NOTE — TELEPHONE ENCOUNTER
Sofiya Coleman  9:39 AM                Pt calling to cancel her procedure tomorrow with Dr. JU MONTALVO [facility] due to just returning home from [place].  Pt stating that her children's  is requiring a 10-14 day quarentine due to traveling to a \"hot-spot\

## 2021-08-09 NOTE — TELEPHONE ENCOUNTER
Pt calling to cancel her procedure tomorrow with Dr. Janice Cooley due to just returning home from [place]. Pt stating that her children's  is requiring a 10-14 day quarentine due to traveling to a \"hot-spot\".  Pt states that she is fully vaccinated but she

## 2021-08-09 NOTE — TELEPHONE ENCOUNTER
Patient calling to reschedule 8/10 injection (see note below). Pt was rescheduled. Pt did not need a review of instructions. Pt was appreciative of the assistance. No further needs.        Appointment Date: 8/10 rescheduled to 8/19  Check-In Time: 9:00am

## 2021-08-10 ENCOUNTER — APPOINTMENT (OUTPATIENT)
Dept: PHYSICAL THERAPY | Age: 41
End: 2021-08-10
Attending: INTERNAL MEDICINE
Payer: COMMERCIAL

## 2021-08-12 ENCOUNTER — APPOINTMENT (OUTPATIENT)
Dept: PHYSICAL THERAPY | Age: 41
End: 2021-08-12
Attending: INTERNAL MEDICINE
Payer: COMMERCIAL

## 2021-08-17 ENCOUNTER — APPOINTMENT (OUTPATIENT)
Dept: PHYSICAL THERAPY | Age: 41
End: 2021-08-17
Attending: INTERNAL MEDICINE
Payer: COMMERCIAL

## 2021-08-18 NOTE — TELEPHONE ENCOUNTER
Patient calling to reschedule 8/19 injection due to a work conflict. Pt was rescheduled. Pt did not need a review of instructions. Pt was appreciative of the assistance.  No further needs.         Appointment Date: 8/19 rescheduled to 8/24  Check-In [facility]

## 2021-08-19 ENCOUNTER — APPOINTMENT (OUTPATIENT)
Dept: PHYSICAL THERAPY | Age: 41
End: 2021-08-19
Attending: INTERNAL MEDICINE
Payer: COMMERCIAL

## 2021-08-24 ENCOUNTER — APPOINTMENT (OUTPATIENT)
Dept: PHYSICAL THERAPY | Age: 41
End: 2021-08-24
Attending: INTERNAL MEDICINE
Payer: COMMERCIAL

## 2021-08-24 ENCOUNTER — APPOINTMENT (OUTPATIENT)
Dept: GENERAL RADIOLOGY | Facility: HOSPITAL | Age: 41
End: 2021-08-24
Attending: ANESTHESIOLOGY
Payer: COMMERCIAL

## 2021-08-24 ENCOUNTER — HOSPITAL ENCOUNTER (OUTPATIENT)
Facility: HOSPITAL | Age: 41
Setting detail: HOSPITAL OUTPATIENT SURGERY
Discharge: HOME | End: 2021-08-24
Attending: ANESTHESIOLOGY | Admitting: ANESTHESIOLOGY
Payer: COMMERCIAL

## 2021-08-24 VITALS
TEMPERATURE: 97 F | DIASTOLIC BLOOD PRESSURE: 70 MMHG | RESPIRATION RATE: 16 BRPM | SYSTOLIC BLOOD PRESSURE: 115 MMHG | HEART RATE: 67 BPM | OXYGEN SATURATION: 100 %

## 2021-08-24 DIAGNOSIS — G57.01 PIRIFORMIS SYNDROME OF RIGHT SIDE: ICD-10-CM

## 2021-08-24 PROCEDURE — 20552 NJX 1/MLT TRIGGER POINT 1/2: CPT | Performed by: ANESTHESIOLOGY

## 2021-08-24 PROCEDURE — 3E023BZ INTRODUCTION OF ANESTHETIC AGENT INTO MUSCLE, PERCUTANEOUS APPROACH: ICD-10-PCS | Performed by: ANESTHESIOLOGY

## 2021-08-24 PROCEDURE — 3E0233Z INTRODUCTION OF ANTI-INFLAMMATORY INTO MUSCLE, PERCUTANEOUS APPROACH: ICD-10-PCS | Performed by: ANESTHESIOLOGY

## 2021-08-24 PROCEDURE — 77002 NEEDLE LOCALIZATION BY XRAY: CPT | Performed by: ANESTHESIOLOGY

## 2021-08-24 PROCEDURE — BW111ZZ FLUOROSCOPY OF ABDOMEN AND PELVIS USING LOW OSMOLAR CONTRAST: ICD-10-PCS | Performed by: ANESTHESIOLOGY

## 2021-08-24 RX ORDER — DIPHENHYDRAMINE HYDROCHLORIDE 50 MG/ML
50 INJECTION INTRAMUSCULAR; INTRAVENOUS ONCE AS NEEDED
Status: DISCONTINUED | OUTPATIENT
Start: 2021-08-24 | End: 2021-08-24

## 2021-08-24 RX ORDER — ONDANSETRON 2 MG/ML
4 INJECTION INTRAMUSCULAR; INTRAVENOUS ONCE AS NEEDED
Status: DISCONTINUED | OUTPATIENT
Start: 2021-08-24 | End: 2021-08-24

## 2021-08-24 RX ORDER — METHYLPREDNISOLONE ACETATE 40 MG/ML
INJECTION, SUSPENSION INTRA-ARTICULAR; INTRALESIONAL; INTRAMUSCULAR; SOFT TISSUE
Status: DISCONTINUED | OUTPATIENT
Start: 2021-08-24 | End: 2021-08-24

## 2021-08-24 RX ORDER — LIDOCAINE HYDROCHLORIDE 10 MG/ML
INJECTION, SOLUTION EPIDURAL; INFILTRATION; INTRACAUDAL; PERINEURAL
Status: DISCONTINUED | OUTPATIENT
Start: 2021-08-24 | End: 2021-08-24

## 2021-08-24 NOTE — H&P
History & Physical Examination    Patient Name: Cora Magaña  MRN: [id number]  CSN: [id number]  YOB: 1980    Pre-Operative Diagnosis:  Piriformis syndrome of right side [G57.01]    Present Illness: Patient with piriformis syndrome for pirif

## 2021-08-24 NOTE — OPERATIVE REPORT
[facility]  Operative Report  2021     Sandy Beltrán Patient Status:  Hospital Outpatient Surgery    1980 MRN [id number]   Location [address] [place] [address] Attending Jamil Manriquez MD   Hosp Day # 0 PCP Colleen Díaz MD procedure very well. There was no subjective or objective loss of motor strength. The patient was observed until discharge criteria met. Discharge instructions were given and patient was released to a responsible adult. Complications: None.     Follow

## 2021-08-26 ENCOUNTER — APPOINTMENT (OUTPATIENT)
Dept: PHYSICAL THERAPY | Age: 41
End: 2021-08-26
Attending: INTERNAL MEDICINE
Payer: COMMERCIAL

## 2021-08-30 ENCOUNTER — APPOINTMENT (OUTPATIENT)
Dept: PHYSICAL THERAPY | Age: 41
End: 2021-08-30
Attending: INTERNAL MEDICINE
Payer: COMMERCIAL

## 2021-08-31 ENCOUNTER — APPOINTMENT (OUTPATIENT)
Dept: PHYSICAL THERAPY | Age: 41
End: 2021-08-31
Attending: INTERNAL MEDICINE
Payer: COMMERCIAL

## 2021-09-03 ENCOUNTER — APPOINTMENT (OUTPATIENT)
Dept: PHYSICAL THERAPY | Age: 41
End: 2021-09-03
Attending: INTERNAL MEDICINE
Payer: COMMERCIAL

## 2021-09-07 ENCOUNTER — APPOINTMENT (OUTPATIENT)
Dept: PHYSICAL THERAPY | Age: 41
End: 2021-09-07
Attending: INTERNAL MEDICINE
Payer: COMMERCIAL

## 2021-09-10 ENCOUNTER — OFFICE VISIT (OUTPATIENT)
Dept: INTERNAL MEDICINE CLINIC | Facility: CLINIC | Age: 41
End: 2021-09-10
Payer: COMMERCIAL

## 2021-09-10 ENCOUNTER — LAB ENCOUNTER (OUTPATIENT)
Dept: LAB | Age: 41
End: 2021-09-10
Attending: INTERNAL MEDICINE
Payer: COMMERCIAL

## 2021-09-10 ENCOUNTER — APPOINTMENT (OUTPATIENT)
Dept: PHYSICAL THERAPY | Age: 41
End: 2021-09-10
Attending: INTERNAL MEDICINE
Payer: COMMERCIAL

## 2021-09-10 VITALS
HEART RATE: 81 BPM | WEIGHT: 122.38 LBS | TEMPERATURE: 98 F | BODY MASS INDEX: 23.11 KG/M2 | HEIGHT: 61 IN | SYSTOLIC BLOOD PRESSURE: 112 MMHG | RESPIRATION RATE: 16 BRPM | OXYGEN SATURATION: 100 % | DIASTOLIC BLOOD PRESSURE: 70 MMHG

## 2021-09-10 DIAGNOSIS — R19.7 DIARRHEA OF PRESUMED INFECTIOUS ORIGIN: Primary | ICD-10-CM

## 2021-09-10 PROCEDURE — 3078F DIAST BP <80 MM HG: CPT | Performed by: INTERNAL MEDICINE

## 2021-09-10 PROCEDURE — 3074F SYST BP LT 130 MM HG: CPT | Performed by: INTERNAL MEDICINE

## 2021-09-10 PROCEDURE — 3008F BODY MASS INDEX DOCD: CPT | Performed by: INTERNAL MEDICINE

## 2021-09-10 PROCEDURE — 99213 OFFICE O/P EST LOW 20 MIN: CPT | Performed by: INTERNAL MEDICINE

## 2021-09-10 NOTE — PROGRESS NOTES
Gabriel Shelton is a 41year old female. Patient presents with:  Diarrhea: RM 3-JY, pt states been going for 3 weeks      HPI:     Patient c/o diarrhea for last 4 weeks. Happens from 9 am to 12pm, about 3-4 episodes of loose watery stool.  No cramps with i Breastfeeding No   BMI 23.13 kg/m²   GENERAL: well developed, NAD  SKIN: no rashes,no suspicious lesions  HEENT: atraumatic, normocephalic  LUNGS: normal rate without respiratory distress, lungs clear to auscultation  CARDIO: RRR nl S1 S2  GI: normal bowel

## 2021-09-11 ENCOUNTER — NURSE ONLY (OUTPATIENT)
Dept: LAB | Facility: HOSPITAL | Age: 41
End: 2021-09-11
Attending: INTERNAL MEDICINE
Payer: COMMERCIAL

## 2021-09-11 DIAGNOSIS — R19.7 DIARRHEA OF PRESUMED INFECTIOUS ORIGIN: ICD-10-CM

## 2021-09-14 ENCOUNTER — LAB ENCOUNTER (OUTPATIENT)
Dept: LAB | Age: 41
End: 2021-09-14
Attending: INTERNAL MEDICINE
Payer: COMMERCIAL

## 2021-09-14 DIAGNOSIS — R19.7 DIARRHEA OF PRESUMED INFECTIOUS ORIGIN: ICD-10-CM

## 2021-09-14 PROCEDURE — 87077 CULTURE AEROBIC IDENTIFY: CPT

## 2021-09-14 PROCEDURE — 87045 FECES CULTURE AEROBIC BACT: CPT

## 2021-09-14 PROCEDURE — 87046 STOOL CULTR AEROBIC BACT EA: CPT

## 2021-09-14 PROCEDURE — 87427 SHIGA-LIKE TOXIN AG IA: CPT

## 2021-09-15 ENCOUNTER — LAB ENCOUNTER (OUTPATIENT)
Dept: LAB | Age: 41
End: 2021-09-15
Attending: INTERNAL MEDICINE
Payer: COMMERCIAL

## 2021-09-15 DIAGNOSIS — R19.7 DIARRHEA OF PRESUMED INFECTIOUS ORIGIN: ICD-10-CM

## 2021-09-15 LAB
ALBUMIN SERPL-MCNC: 3.8 G/DL (ref 3.4–5)
ALBUMIN/GLOB SERPL: 1.1 {RATIO} (ref 1–2)
ALP LIVER SERPL-CCNC: 54 U/L
ALT SERPL-CCNC: 27 U/L
ANION GAP SERPL CALC-SCNC: 7 MMOL/L (ref 0–18)
AST SERPL-CCNC: 23 U/L (ref 15–37)
BASOPHILS # BLD AUTO: 0.09 X10(3) UL (ref 0–0.2)
BASOPHILS NFR BLD AUTO: 1.6 %
BILIRUB SERPL-MCNC: 0.3 MG/DL (ref 0.1–2)
BUN BLD-MCNC: 11 MG/DL (ref 7–18)
CALCIUM BLD-MCNC: 9 MG/DL (ref 8.5–10.1)
CHLORIDE SERPL-SCNC: 104 MMOL/L (ref 98–112)
CO2 SERPL-SCNC: 27 MMOL/L (ref 21–32)
CREAT BLD-MCNC: 0.89 MG/DL
EOSINOPHIL # BLD AUTO: 0.12 X10(3) UL (ref 0–0.7)
EOSINOPHIL NFR BLD AUTO: 2.1 %
ERYTHROCYTE [DISTWIDTH] IN BLOOD BY AUTOMATED COUNT: 13.2 %
GLOBULIN PLAS-MCNC: 3.5 G/DL (ref 2.8–4.4)
GLUCOSE BLD-MCNC: 78 MG/DL (ref 70–99)
HCT VFR BLD AUTO: 39.7 %
HGB BLD-MCNC: 12.9 G/DL
IMM GRANULOCYTES # BLD AUTO: 0.01 X10(3) UL (ref 0–1)
IMM GRANULOCYTES NFR BLD: 0.2 %
LYMPHOCYTES # BLD AUTO: 1.78 X10(3) UL (ref 1–4)
LYMPHOCYTES NFR BLD AUTO: 30.9 %
MCH RBC QN AUTO: 29.1 PG (ref 26–34)
MCHC RBC AUTO-ENTMCNC: 32.5 G/DL (ref 31–37)
MCV RBC AUTO: 89.6 FL
MONOCYTES # BLD AUTO: 0.56 X10(3) UL (ref 0.1–1)
MONOCYTES NFR BLD AUTO: 9.7 %
NEUTROPHILS # BLD AUTO: 3.2 X10 (3) UL (ref 1.5–7.7)
NEUTROPHILS # BLD AUTO: 3.2 X10(3) UL (ref 1.5–7.7)
NEUTROPHILS NFR BLD AUTO: 55.5 %
OSMOLALITY SERPL CALC.SUM OF ELEC: 284 MOSM/KG (ref 275–295)
PATIENT FASTING Y/N/NP: NO
PLATELET # BLD AUTO: 190 10(3)UL (ref 150–450)
POTASSIUM SERPL-SCNC: 3.5 MMOL/L (ref 3.5–5.1)
PROT SERPL-MCNC: 7.3 G/DL (ref 6.4–8.2)
RBC # BLD AUTO: 4.43 X10(6)UL
SARS-COV-2 RNA RESP QL NAA+PROBE: NOT DETECTED
SODIUM SERPL-SCNC: 138 MMOL/L (ref 136–145)
WBC # BLD AUTO: 5.8 X10(3) UL (ref 4–11)

## 2021-09-15 PROCEDURE — 36415 COLL VENOUS BLD VENIPUNCTURE: CPT

## 2021-09-15 PROCEDURE — 80053 COMPREHEN METABOLIC PANEL: CPT

## 2021-09-15 PROCEDURE — 85025 COMPLETE CBC W/AUTO DIFF WBC: CPT

## 2021-09-17 ENCOUNTER — OFFICE VISIT (OUTPATIENT)
Dept: OBGYN CLINIC | Facility: CLINIC | Age: 41
End: 2021-09-17
Payer: COMMERCIAL

## 2021-09-17 ENCOUNTER — LAB ENCOUNTER (OUTPATIENT)
Dept: LAB | Age: 41
End: 2021-09-17
Attending: OBSTETRICS & GYNECOLOGY
Payer: COMMERCIAL

## 2021-09-17 VITALS
WEIGHT: 121.19 LBS | HEART RATE: 71 BPM | HEIGHT: 61 IN | SYSTOLIC BLOOD PRESSURE: 114 MMHG | BODY MASS INDEX: 22.88 KG/M2 | DIASTOLIC BLOOD PRESSURE: 70 MMHG

## 2021-09-17 DIAGNOSIS — Z30.09 STERILIZATION CONSULT: ICD-10-CM

## 2021-09-17 DIAGNOSIS — N91.1 SECONDARY AMENORRHEA: ICD-10-CM

## 2021-09-17 DIAGNOSIS — Z12.39 ENCOUNTER FOR SCREENING FOR MALIGNANT NEOPLASM OF BREAST, UNSPECIFIED SCREENING MODALITY: ICD-10-CM

## 2021-09-17 DIAGNOSIS — Z12.4 SCREENING FOR MALIGNANT NEOPLASM OF CERVIX: ICD-10-CM

## 2021-09-17 DIAGNOSIS — Z01.419 WELL WOMAN EXAM WITH ROUTINE GYNECOLOGICAL EXAM: Primary | ICD-10-CM

## 2021-09-17 PROBLEM — Z30.41 SURVEILLANCE FOR BIRTH CONTROL, ORAL CONTRACEPTIVES: Status: RESOLVED | Noted: 2018-06-05 | Resolved: 2021-09-17

## 2021-09-17 LAB
ESTRADIOL SERPL-MCNC: 261.5 PG/ML
FSH SERPL-ACNC: 15.5 MIU/ML
LH SERPL-ACNC: 20.9 MIU/ML
PROLACTIN SERPL-MCNC: 11.3 NG/ML

## 2021-09-17 PROCEDURE — 87624 HPV HI-RISK TYP POOLED RSLT: CPT | Performed by: OBSTETRICS & GYNECOLOGY

## 2021-09-17 PROCEDURE — 84402 ASSAY OF FREE TESTOSTERONE: CPT

## 2021-09-17 PROCEDURE — 84146 ASSAY OF PROLACTIN: CPT

## 2021-09-17 PROCEDURE — 83001 ASSAY OF GONADOTROPIN (FSH): CPT

## 2021-09-17 PROCEDURE — 36415 COLL VENOUS BLD VENIPUNCTURE: CPT

## 2021-09-17 PROCEDURE — 88175 CYTOPATH C/V AUTO FLUID REDO: CPT | Performed by: OBSTETRICS & GYNECOLOGY

## 2021-09-17 PROCEDURE — 84403 ASSAY OF TOTAL TESTOSTERONE: CPT

## 2021-09-17 PROCEDURE — 83002 ASSAY OF GONADOTROPIN (LH): CPT

## 2021-09-17 PROCEDURE — 3078F DIAST BP <80 MM HG: CPT | Performed by: OBSTETRICS & GYNECOLOGY

## 2021-09-17 PROCEDURE — 99396 PREV VISIT EST AGE 40-64: CPT | Performed by: OBSTETRICS & GYNECOLOGY

## 2021-09-17 PROCEDURE — 3008F BODY MASS INDEX DOCD: CPT | Performed by: OBSTETRICS & GYNECOLOGY

## 2021-09-17 PROCEDURE — 82670 ASSAY OF TOTAL ESTRADIOL: CPT

## 2021-09-17 PROCEDURE — 3074F SYST BP LT 130 MM HG: CPT | Performed by: OBSTETRICS & GYNECOLOGY

## 2021-09-17 NOTE — PROGRESS NOTES
GYN H&P     2021  10:54 AM    CC: Patient is here for annual exam    HPI: patient is a 41year old  here for her annual exam  She reports since her tuba ligation on  she had menses in July and none since. She is always hot.  Was on OCP b and blood in stool. Genitourinary: Positive for menstrual problem. Negative for urgency, vaginal bleeding and vaginal discharge. All other systems reviewed and are negative.         /70   Pulse 71   Ht 61\"   Wt 121 lb 3.2 oz (55 kg)   LMP 07/15/2 Radicular pain of right lower extremity     Lumbosacral radiculopathy     Sterilization consult     Piriformis syndrome of right side     Diarrhea of presumed infectious origin       Normal exam, no evidence of pregnancy (and doubt pregnancy given salpinge

## 2021-09-20 ENCOUNTER — TELEPHONE (OUTPATIENT)
Dept: OBGYN CLINIC | Facility: CLINIC | Age: 41
End: 2021-09-20

## 2021-09-20 LAB — HPV I/H RISK 1 DNA SPEC QL NAA+PROBE: NEGATIVE

## 2021-09-20 NOTE — TELEPHONE ENCOUNTER
Patient called for results. Can you please advise on blood work. Patient aware she will hear back tomorrow.

## 2021-09-21 NOTE — TELEPHONE ENCOUNTER
Patient notified that Testosterone is still pending and we will call her once finalized.   It usually takes about 1 week for her testosterone is final

## 2021-09-22 LAB
SEX HORMONE BINDING GLOBULIN: 89 NMOL/L
TESTOSTERONE -MS, BIOAVAILAB: 8.8 NG/DL
TESTOSTERONE, -MS/MS: 36 NG/DL
TESTOSTERONE, FREE -MS/MS: 3.1 PG/ML

## 2021-09-27 ENCOUNTER — TELEPHONE (OUTPATIENT)
Dept: OBGYN CLINIC | Facility: CLINIC | Age: 41
End: 2021-09-27

## 2021-09-27 NOTE — PROGRESS NOTES
Please let pt know her labs are overall unremarkable.  She is definitively not postmenopausal as her estrogen levels are normal, however, her other labs are indeterminite so could be approaching perimenopause  Either way, I would recommend she observe for a

## 2021-09-27 NOTE — TELEPHONE ENCOUNTER
Patient states that her test results came in 10 days ago and she is waiting on a phone call to advise to further treatment or next steps

## 2022-01-26 ENCOUNTER — TELEPHONE (OUTPATIENT)
Dept: INTERNAL MEDICINE CLINIC | Facility: CLINIC | Age: 42
End: 2022-01-26

## 2022-01-26 DIAGNOSIS — Z11.1 TUBERCULOSIS SCREENING: Primary | ICD-10-CM

## 2022-01-26 NOTE — TELEPHONE ENCOUNTER
Pt stated she needs a TB test for work. Pt requesting lab for TB and pw to include MMR results, Tdap vaccine and TB results. Pt will attached the form that TB did for her, but it is incomplete and needs to be redone. Please advise.

## 2022-01-27 NOTE — TELEPHONE ENCOUNTER
TB, okay to order TB quant? Pended. Had MMR lab 6/5/2018, TDAP 11/15/17    Will ask about paperwork when calling pt back.

## 2022-01-27 NOTE — TELEPHONE ENCOUNTER
----- Message -----   From: Alex Capay   Sent: 1/26/2022   4:19 PM CST   To: [employer] Customer Response Pool   Subject: Form to be completed                               Topic: <<Select One of the Topics Below>>.       Hi Dr. Celena Bunn,   My employer is

## 2022-01-28 NOTE — TELEPHONE ENCOUNTER
Called pt to inform her that the attachment did not come in with the message. Pt informed me she will be having her  fax her form needing to be filled out from her 4/2021 physical.  Please call when completed.   Pt has already completed TB test else

## 2022-02-08 NOTE — TELEPHONE ENCOUNTER
Called pt and informed her that MMR, Adult  facility form and immunizations was faxed attention Karen Gilmore at [phone]. Confirmation received. COPY sent to scanning and put in TB and ES bin. Original mailed to pt.

## 2022-03-29 ENCOUNTER — HOSPITAL ENCOUNTER (EMERGENCY)
Age: 42
Discharge: HOME | End: 2022-03-29
Attending: EMERGENCY MEDICINE
Payer: COMMERCIAL

## 2022-03-29 VITALS
HEART RATE: 70 BPM | SYSTOLIC BLOOD PRESSURE: 137 MMHG | DIASTOLIC BLOOD PRESSURE: 57 MMHG | RESPIRATION RATE: 18 BRPM | BODY MASS INDEX: 20.96 KG/M2 | OXYGEN SATURATION: 100 % | TEMPERATURE: 97 F | HEIGHT: 61 IN | WEIGHT: 111 LBS

## 2022-03-29 DIAGNOSIS — R30.0 DYSURIA: Primary | ICD-10-CM

## 2022-03-29 DIAGNOSIS — N39.0 ACUTE UTI: ICD-10-CM

## 2022-03-29 LAB
BILIRUB UR QL CFM: NEGATIVE
RBC #/AREA URNS AUTO: >10 /HPF

## 2022-03-29 PROCEDURE — 99283 EMERGENCY DEPT VISIT LOW MDM: CPT

## 2022-03-29 PROCEDURE — 87086 URINE CULTURE/COLONY COUNT: CPT | Performed by: EMERGENCY MEDICINE

## 2022-03-29 PROCEDURE — 81001 URINALYSIS AUTO W/SCOPE: CPT

## 2022-03-29 PROCEDURE — 81001 URINALYSIS AUTO W/SCOPE: CPT | Performed by: EMERGENCY MEDICINE

## 2022-03-29 PROCEDURE — 81015 MICROSCOPIC EXAM OF URINE: CPT

## 2022-03-29 PROCEDURE — 87086 URINE CULTURE/COLONY COUNT: CPT

## 2022-03-29 RX ORDER — LISDEXAMFETAMINE DIMESYLATE 40 MG/1
CAPSULE ORAL
COMMUNITY
Start: 2022-03-17

## 2022-03-29 RX ORDER — CIPROFLOXACIN 500 MG/1
500 TABLET, FILM COATED ORAL 2 TIMES DAILY
Qty: 20 TABLET | Refills: 0 | Status: SHIPPED | OUTPATIENT
Start: 2022-03-29 | End: 2022-04-08

## 2022-03-29 RX ORDER — PHENAZOPYRIDINE HYDROCHLORIDE 200 MG/1
200 TABLET, FILM COATED ORAL 3 TIMES DAILY PRN
Qty: 6 TABLET | Refills: 0 | Status: SHIPPED | OUTPATIENT
Start: 2022-03-29 | End: 2022-04-05

## 2022-03-30 NOTE — ED INITIAL ASSESSMENT (HPI)
Pt came w/ c/o pain with urination since 1600. Pain in abdomen earlier per pt has stopped. Per pt 2nd UTI. Per pt denies all other complaints at the moment.

## 2022-06-29 ENCOUNTER — HOSPITAL ENCOUNTER (EMERGENCY)
Age: 42
Discharge: HOME | End: 2022-06-30
Attending: EMERGENCY MEDICINE
Payer: COMMERCIAL

## 2022-06-29 ENCOUNTER — APPOINTMENT (OUTPATIENT)
Dept: CT IMAGING | Age: 42
End: 2022-06-29
Attending: EMERGENCY MEDICINE
Payer: COMMERCIAL

## 2022-06-29 VITALS
OXYGEN SATURATION: 100 % | SYSTOLIC BLOOD PRESSURE: 100 MMHG | WEIGHT: 111 LBS | HEIGHT: 61 IN | RESPIRATION RATE: 16 BRPM | BODY MASS INDEX: 20.96 KG/M2 | HEART RATE: 78 BPM | TEMPERATURE: 100 F | DIASTOLIC BLOOD PRESSURE: 54 MMHG

## 2022-06-29 DIAGNOSIS — K57.92 ACUTE DIVERTICULITIS: Primary | ICD-10-CM

## 2022-06-29 LAB
ALBUMIN SERPL-MCNC: 4.1 G/DL (ref 3.4–5)
ALBUMIN/GLOB SERPL: 1.2 {RATIO} (ref 1–2)
ALP LIVER SERPL-CCNC: 63 U/L
ALT SERPL-CCNC: 28 U/L
ANION GAP SERPL CALC-SCNC: 4 MMOL/L (ref 0–18)
AST SERPL-CCNC: 25 U/L (ref 15–37)
BASOPHILS # BLD AUTO: 0.08 X10(3) UL (ref 0–0.2)
BASOPHILS NFR BLD AUTO: 0.6 %
BILIRUB SERPL-MCNC: 0.3 MG/DL (ref 0.1–2)
BILIRUB UR QL STRIP.AUTO: NEGATIVE
BUN BLD-MCNC: 12 MG/DL (ref 7–18)
CALCIUM BLD-MCNC: 9.2 MG/DL (ref 8.5–10.1)
CHLORIDE SERPL-SCNC: 102 MMOL/L (ref 98–112)
CLARITY UR REFRACT.AUTO: CLEAR
CO2 SERPL-SCNC: 28 MMOL/L (ref 21–32)
COLOR UR AUTO: YELLOW
CREAT BLD-MCNC: 0.88 MG/DL
EOSINOPHIL # BLD AUTO: 0.09 X10(3) UL (ref 0–0.7)
EOSINOPHIL NFR BLD AUTO: 0.7 %
ERYTHROCYTE [DISTWIDTH] IN BLOOD BY AUTOMATED COUNT: 12.9 %
GLOBULIN PLAS-MCNC: 3.5 G/DL (ref 2.8–4.4)
GLUCOSE BLD-MCNC: 97 MG/DL (ref 70–99)
GLUCOSE UR STRIP.AUTO-MCNC: NEGATIVE MG/DL
HCT VFR BLD AUTO: 39.6 %
HGB BLD-MCNC: 13 G/DL
IMM GRANULOCYTES # BLD AUTO: 0.05 X10(3) UL (ref 0–1)
IMM GRANULOCYTES NFR BLD: 0.4 %
KETONES UR STRIP.AUTO-MCNC: NEGATIVE MG/DL
LEUKOCYTE ESTERASE UR QL STRIP.AUTO: NEGATIVE
LYMPHOCYTES # BLD AUTO: 0.89 X10(3) UL (ref 1–4)
LYMPHOCYTES NFR BLD AUTO: 7.2 %
MCH RBC QN AUTO: 28.8 PG (ref 26–34)
MCHC RBC AUTO-ENTMCNC: 32.8 G/DL (ref 31–37)
MCV RBC AUTO: 87.8 FL
MONOCYTES # BLD AUTO: 0.83 X10(3) UL (ref 0.1–1)
MONOCYTES NFR BLD AUTO: 6.7 %
NEUTROPHILS # BLD AUTO: 10.42 X10 (3) UL (ref 1.5–7.7)
NEUTROPHILS # BLD AUTO: 10.42 X10(3) UL (ref 1.5–7.7)
NEUTROPHILS NFR BLD AUTO: 84.4 %
NITRITE UR QL STRIP.AUTO: NEGATIVE
OSMOLALITY SERPL CALC.SUM OF ELEC: 278 MOSM/KG (ref 275–295)
PH UR STRIP.AUTO: 6 [PH] (ref 5–8)
PLATELET # BLD AUTO: 227 10(3)UL (ref 150–450)
POTASSIUM SERPL-SCNC: 3.8 MMOL/L (ref 3.5–5.1)
PROT SERPL-MCNC: 7.6 G/DL (ref 6.4–8.2)
PROT UR STRIP.AUTO-MCNC: NEGATIVE MG/DL
RBC # BLD AUTO: 4.51 X10(6)UL
RBC UR QL AUTO: NEGATIVE
SODIUM SERPL-SCNC: 134 MMOL/L (ref 136–145)
SP GR UR STRIP.AUTO: 1.01 (ref 1–1.03)
UROBILINOGEN UR STRIP.AUTO-MCNC: 0.2 MG/DL
WBC # BLD AUTO: 12.4 X10(3) UL (ref 4–11)

## 2022-06-29 PROCEDURE — 85025 COMPLETE CBC W/AUTO DIFF WBC: CPT | Performed by: EMERGENCY MEDICINE

## 2022-06-29 PROCEDURE — 81003 URINALYSIS AUTO W/O SCOPE: CPT

## 2022-06-29 PROCEDURE — 36415 COLL VENOUS BLD VENIPUNCTURE: CPT

## 2022-06-29 PROCEDURE — 74177 CT ABD & PELVIS W/CONTRAST: CPT | Performed by: EMERGENCY MEDICINE

## 2022-06-29 PROCEDURE — 99284 EMERGENCY DEPT VISIT MOD MDM: CPT

## 2022-06-29 PROCEDURE — 85025 COMPLETE CBC W/AUTO DIFF WBC: CPT

## 2022-06-29 PROCEDURE — 80053 COMPREHEN METABOLIC PANEL: CPT | Performed by: EMERGENCY MEDICINE

## 2022-06-29 PROCEDURE — 81003 URINALYSIS AUTO W/O SCOPE: CPT | Performed by: EMERGENCY MEDICINE

## 2022-06-29 PROCEDURE — 80053 COMPREHEN METABOLIC PANEL: CPT

## 2022-06-29 RX ORDER — METRONIDAZOLE 500 MG/1
500 TABLET ORAL ONCE
Status: COMPLETED | OUTPATIENT
Start: 2022-06-29 | End: 2022-06-29

## 2022-06-29 RX ORDER — SULFAMETHOXAZOLE AND TRIMETHOPRIM 800; 160 MG/1; MG/1
1 TABLET ORAL ONCE
Status: COMPLETED | OUTPATIENT
Start: 2022-06-29 | End: 2022-06-29

## 2022-06-29 RX ORDER — METRONIDAZOLE 500 MG/1
500 TABLET ORAL 3 TIMES DAILY
Qty: 30 TABLET | Refills: 0 | Status: SHIPPED | OUTPATIENT
Start: 2022-06-29 | End: 2022-07-09

## 2022-06-29 RX ORDER — SULFAMETHOXAZOLE AND TRIMETHOPRIM 800; 160 MG/1; MG/1
1 TABLET ORAL 2 TIMES DAILY
Qty: 20 TABLET | Refills: 0 | Status: SHIPPED | OUTPATIENT
Start: 2022-06-29 | End: 2022-07-09

## 2022-06-29 RX ORDER — ONDANSETRON 4 MG/1
4 TABLET, ORALLY DISINTEGRATING ORAL ONCE
Status: COMPLETED | OUTPATIENT
Start: 2022-06-29 | End: 2022-06-29

## 2022-06-29 RX ORDER — ONDANSETRON 4 MG/1
4 TABLET, ORALLY DISINTEGRATING ORAL EVERY 4 HOURS PRN
Qty: 10 TABLET | Refills: 0 | Status: SHIPPED | OUTPATIENT
Start: 2022-06-29 | End: 2022-07-06

## 2022-06-30 NOTE — ED INITIAL ASSESSMENT (HPI)
Pt presents to ER with RLQ pain. She has a hx of diverticulitis and reports the last flare-up was also on her right side, instead of LLQ. She still has her appendix; poor appetite today.

## 2022-09-07 NOTE — MR AVS SNAPSHOT
After Visit Summary   2/20/2017    Farhad Eye    MRN: [id number]           Visit Information        Provider Department Dept Phone    2/20/2017  1:30 PM Yuki Tinsley MD [facility] [phone]      Your Vitals Were     BP Pulse Ht Respiratory at bedside giving treatment     Minh Pierre RN  09/07/22 [id number]

## 2022-09-15 ENCOUNTER — HOSPITAL ENCOUNTER (OUTPATIENT)
Dept: MAMMOGRAPHY | Age: 42
Discharge: HOME | End: 2022-09-15
Attending: OBSTETRICS & GYNECOLOGY
Payer: COMMERCIAL

## 2022-09-15 DIAGNOSIS — Z12.39 ENCOUNTER FOR SCREENING FOR MALIGNANT NEOPLASM OF BREAST, UNSPECIFIED SCREENING MODALITY: ICD-10-CM

## 2022-09-15 PROCEDURE — 77063 BREAST TOMOSYNTHESIS BI: CPT | Performed by: OBSTETRICS & GYNECOLOGY

## 2022-09-15 PROCEDURE — 77067 SCR MAMMO BI INCL CAD: CPT | Performed by: OBSTETRICS & GYNECOLOGY

## 2022-11-06 ENCOUNTER — HOSPITAL ENCOUNTER (OUTPATIENT)
Age: 42
Discharge: HOME | End: 2022-11-06
Payer: COMMERCIAL

## 2022-11-06 VITALS
DIASTOLIC BLOOD PRESSURE: 85 MMHG | BODY MASS INDEX: 20.77 KG/M2 | TEMPERATURE: 98 F | WEIGHT: 110 LBS | HEART RATE: 113 BPM | SYSTOLIC BLOOD PRESSURE: 122 MMHG | OXYGEN SATURATION: 99 % | RESPIRATION RATE: 16 BRPM | HEIGHT: 61 IN

## 2022-11-06 DIAGNOSIS — B34.9 VIRAL SYNDROME: ICD-10-CM

## 2022-11-06 DIAGNOSIS — R50.9 FEVER, UNSPECIFIED FEVER CAUSE: Primary | ICD-10-CM

## 2022-11-06 LAB
POCT INFLUENZA A: NEGATIVE
POCT INFLUENZA B: NEGATIVE
SARS-COV-2 RNA RESP QL NAA+PROBE: NOT DETECTED

## 2023-01-24 ENCOUNTER — NURSE NAVIGATOR ENCOUNTER (OUTPATIENT)
Dept: HEMATOLOGY/ONCOLOGY | Facility: HOSPITAL | Age: 43
End: 2023-01-24

## 2023-01-24 NOTE — PROGRESS NOTES
THONGCB in regards to her VM about scheduling an appointment for the high risk breast assessment clinic. Awaiting phone call back from the patient.

## 2023-02-11 ENCOUNTER — OFFICE VISIT (OUTPATIENT)
Dept: FAMILY MEDICINE CLINIC | Facility: CLINIC | Age: 43
End: 2023-02-11
Payer: COMMERCIAL

## 2023-02-11 VITALS
HEIGHT: 61 IN | SYSTOLIC BLOOD PRESSURE: 120 MMHG | OXYGEN SATURATION: 98 % | BODY MASS INDEX: 21.14 KG/M2 | HEART RATE: 108 BPM | RESPIRATION RATE: 18 BRPM | TEMPERATURE: 97 F | WEIGHT: 112 LBS | DIASTOLIC BLOOD PRESSURE: 78 MMHG

## 2023-02-11 DIAGNOSIS — U07.1 COVID-19: Primary | ICD-10-CM

## 2023-02-11 LAB
OPERATOR ID: ABNORMAL
POCT LOT NUMBER: ABNORMAL
RAPID SARS-COV-2 BY PCR: DETECTED

## 2023-02-11 PROCEDURE — 99213 OFFICE O/P EST LOW 20 MIN: CPT | Performed by: PHYSICIAN ASSISTANT

## 2023-02-11 PROCEDURE — U0002 COVID-19 LAB TEST NON-CDC: HCPCS | Performed by: PHYSICIAN ASSISTANT

## 2023-02-11 PROCEDURE — 3078F DIAST BP <80 MM HG: CPT | Performed by: PHYSICIAN ASSISTANT

## 2023-02-11 PROCEDURE — 3008F BODY MASS INDEX DOCD: CPT | Performed by: PHYSICIAN ASSISTANT

## 2023-02-11 PROCEDURE — 3074F SYST BP LT 130 MM HG: CPT | Performed by: PHYSICIAN ASSISTANT

## 2023-02-15 ENCOUNTER — TELEPHONE (OUTPATIENT)
Dept: INTERNAL MEDICINE CLINIC | Facility: CLINIC | Age: 43
End: 2023-02-15

## 2023-02-16 NOTE — TELEPHONE ENCOUNTER
Received a page from pt asking for medication for COVID infection. Seen in [facility] on Saturday, sxs started day before. Tested positive in [facility]. Pt wondering if she could be treated with steroids like have been prescribed for her co-worker. Reviewed treatment options including Paxlovid. Discussed pt out of window for Paxlovid. Pt denied any further needs at this time.

## 2023-05-15 ENCOUNTER — TELEPHONE (OUTPATIENT)
Dept: INTERNAL MEDICINE CLINIC | Facility: CLINIC | Age: 43
End: 2023-05-15

## 2023-05-15 DIAGNOSIS — Z13.0 SCREENING FOR DISORDER OF BLOOD AND BLOOD-FORMING ORGANS: ICD-10-CM

## 2023-05-15 DIAGNOSIS — Z00.00 ROUTINE GENERAL MEDICAL EXAMINATION AT A HEALTH CARE FACILITY: Primary | ICD-10-CM

## 2023-05-15 DIAGNOSIS — Z13.29 SCREENING FOR THYROID DISORDER: ICD-10-CM

## 2023-05-15 DIAGNOSIS — Z13.220 SCREENING FOR LIPID DISORDERS: ICD-10-CM

## 2023-05-15 DIAGNOSIS — Z13.228 SCREENING FOR METABOLIC DISORDER: ICD-10-CM

## 2023-05-15 NOTE — TELEPHONE ENCOUNTER
Future Appointments   Date Time Provider Beck Montalvo   8/4/2023  9:00 AM Ana Olmstead MD [facility]     Informed must fast no call back required.  Orders to Rosey Ornelas

## 2023-05-24 ENCOUNTER — OFFICE VISIT (OUTPATIENT)
Dept: INTERNAL MEDICINE CLINIC | Facility: CLINIC | Age: 43
End: 2023-05-24
Payer: COMMERCIAL

## 2023-05-24 VITALS
RESPIRATION RATE: 16 BRPM | WEIGHT: 115.81 LBS | SYSTOLIC BLOOD PRESSURE: 118 MMHG | BODY MASS INDEX: 20.52 KG/M2 | DIASTOLIC BLOOD PRESSURE: 64 MMHG | OXYGEN SATURATION: 100 % | TEMPERATURE: 98 F | HEIGHT: 63 IN | HEART RATE: 80 BPM

## 2023-05-24 DIAGNOSIS — G57.01 PIRIFORMIS SYNDROME OF RIGHT SIDE: Primary | ICD-10-CM

## 2023-05-24 PROCEDURE — 3008F BODY MASS INDEX DOCD: CPT | Performed by: INTERNAL MEDICINE

## 2023-05-24 PROCEDURE — 3074F SYST BP LT 130 MM HG: CPT | Performed by: INTERNAL MEDICINE

## 2023-05-24 PROCEDURE — 99213 OFFICE O/P EST LOW 20 MIN: CPT | Performed by: INTERNAL MEDICINE

## 2023-05-24 PROCEDURE — 3078F DIAST BP <80 MM HG: CPT | Performed by: INTERNAL MEDICINE

## 2023-05-24 RX ORDER — GUANFACINE 2 MG/1
TABLET, EXTENDED RELEASE ORAL
COMMUNITY
Start: 2023-02-21

## 2023-05-24 RX ORDER — PROPRANOLOL HYDROCHLORIDE 10 MG/1
10 TABLET ORAL 2 TIMES DAILY
COMMUNITY
Start: 2023-03-08

## 2023-05-24 RX ORDER — NAPROXEN 500 MG/1
500 TABLET ORAL 2 TIMES DAILY WITH MEALS
Qty: 30 TABLET | Refills: 0 | Status: SHIPPED | OUTPATIENT
Start: 2023-05-24

## 2023-05-24 RX ORDER — GUANFACINE 1 MG/1
1 TABLET, EXTENDED RELEASE ORAL DAILY
COMMUNITY
Start: 2023-04-05

## 2023-07-07 ENCOUNTER — OFFICE VISIT (OUTPATIENT)
Dept: PAIN CLINIC | Facility: CLINIC | Age: 43
End: 2023-07-07
Payer: COMMERCIAL

## 2023-07-07 VITALS — SYSTOLIC BLOOD PRESSURE: 118 MMHG | DIASTOLIC BLOOD PRESSURE: 70 MMHG | OXYGEN SATURATION: 99 % | HEART RATE: 82 BPM

## 2023-07-07 DIAGNOSIS — G57.01 PIRIFORMIS SYNDROME OF RIGHT SIDE: Primary | ICD-10-CM

## 2023-07-07 PROCEDURE — 3074F SYST BP LT 130 MM HG: CPT | Performed by: ANESTHESIOLOGY

## 2023-07-07 PROCEDURE — 99214 OFFICE O/P EST MOD 30 MIN: CPT | Performed by: ANESTHESIOLOGY

## 2023-07-07 PROCEDURE — 3078F DIAST BP <80 MM HG: CPT | Performed by: ANESTHESIOLOGY

## 2023-07-07 RX ORDER — METHYLPREDNISOLONE 4 MG/1
TABLET ORAL
Qty: 1 EACH | Refills: 0 | Status: SHIPPED | OUTPATIENT
Start: 2023-07-07

## 2023-07-07 RX ORDER — LISDEXAMFETAMINE DIMESYLATE 50 MG
50 CAPSULE ORAL EVERY MORNING
COMMUNITY
Start: 2023-06-21

## 2023-07-07 NOTE — PROGRESS NOTES
Name: Stacy Vilchis   : 1980   DOS: 2023     Pain Clinic Follow Up Visit:   Patient presents with: Follow - Up: LOV ; constant pain in right leg      Stacy Vilchis is a 38year old female with a history of piriformis syndrome. Patient was treated for this with piriformis injection in . This led to 100% resolution of pain symptoms for 1.5 years. Now has return of symptoms. Pt denies any chills, fever, or weakness. There is no bladder or bowel incontinence associated with the pain. REVIEW OF SYSTEMS:  A ten point review of systems was performed with pertinent positives and negatives in the HPI. No Known Allergies    Current Outpatient Medications   Medication Sig Dispense Refill    VYVANSE 50 MG Oral Cap Take 1 capsule (50 mg total) by mouth every morning. methylPREDNISolone (MEDROL) 4 MG Oral Tablet Therapy Pack Take as directed 1 each 0    propranolol 10 MG Oral Tab Take 1 tablet (10 mg total) by mouth 2 (two) times daily. Melatonin 10 MG Oral Cap Take 1 capsule by mouth every evening. cetirizine 10 MG Oral Tab Take 1 tablet (10 mg total) by mouth daily. EXAM:   /70 (BP Location: Left arm, Patient Position: Sitting, Cuff Size: adult)   Pulse 82   LMP  (LMP Unknown)   SpO2 99%   General:  Patient is a(n) 38year old year old female in no acute distress. Neurologic[de-identified] WNL-Orientation to time, place and person, normal mood & affect, concentration & attention span intact. Inspection:  Ambulates with well-coordinated, fluid, non-antalgic gait. Gait is normal.  Neck: Full range of motion  Back: Reproducible tenderness palpation of the piriformis musculature  Cranial nerve: Grossly intact  Respiratory: Nonlabored    IMAGES:     No new imaging    ASSESSMENT AND PLAN:   Piriformis syndrome of right side  (primary encounter diagnosis)      The patient is a 40-year-old female with a history of piriformis syndrome.   Was treated successfully with this with piriformis injection in 2021. This led to 1.5 years of symptom relief. Now pain has returned. This is reproducible with palpation. Has failed conservative therapy including home exercise and nonsteroidals. I discussed repeat injection. Also provided a steroid Dosepak. Pain is  limiting functional status. Failed conservative treatment consisting of medications, activity modification, and  PT. 1.Risks of long term narcotic use d/w pt including dependence, abuse, and death from overdose and mixing narcotic with alcohol. Pt verbalized understanding. Medications filled today:  Requested Prescriptions     Signed Prescriptions Disp Refills    methylPREDNISolone (MEDROL) 4 MG Oral Tablet Therapy Pack 1 each 0     Sig: Take as directed     Orders:Orders Placed This Encounter      [address]         Radiology orders and consultations:None  The patient indicates understanding of these issues and agrees to the plan. No follow-ups on file.     Coleen Orona MD, 7/7/2023, 2:27 PM

## 2023-07-07 NOTE — PROGRESS NOTES
Patient presents in office today with reported pain in right glute radiating down leg    Current pain level reported = 0/10, pain comes after sitting for 15 mins or more    Last reported dose of n/a      Narcotic Contract renewal n/a    Urine Drug screen n/a

## 2023-07-11 ENCOUNTER — TELEPHONE (OUTPATIENT)
Dept: PAIN CLINIC | Facility: CLINIC | Age: 43
End: 2023-07-11

## 2023-07-11 NOTE — TELEPHONE ENCOUNTER
Prior authorization request completed for: Right piriformis injection    Authorization # NO PRIOR AUTHORIZATION / PREDETERMINATION REQUIRED / VALID AND BILLABLE  Authorization dates: N/A  CPT codes approved: 60199  Number of visits/dates of service approved: 1  Physician: Dr. Jimi Ochoa  Location: [facility]      Call Ref#: [id number]  Representative Name: Mitchell B     Patient can be scheduled. Routed to Navigator.

## 2023-07-12 NOTE — TELEPHONE ENCOUNTER
Spoke with patient who mentioned  prescribed medrol dose pack and has 2 days left, patient will call office back if she decides to move forward with scheduling.

## 2023-08-03 ENCOUNTER — HOSPITAL ENCOUNTER (OUTPATIENT)
Facility: HOSPITAL | Age: 43
Setting detail: HOSPITAL OUTPATIENT SURGERY
Discharge: HOME | End: 2023-08-03
Attending: ANESTHESIOLOGY | Admitting: ANESTHESIOLOGY
Payer: COMMERCIAL

## 2023-08-03 ENCOUNTER — APPOINTMENT (OUTPATIENT)
Dept: GENERAL RADIOLOGY | Facility: HOSPITAL | Age: 43
End: 2023-08-03
Attending: ANESTHESIOLOGY
Payer: COMMERCIAL

## 2023-08-03 ENCOUNTER — HOSPITAL ENCOUNTER (EMERGENCY)
Facility: HOSPITAL | Age: 43
Discharge: HOME | End: 2023-08-03
Attending: ANESTHESIOLOGY | Admitting: ANESTHESIOLOGY
Payer: COMMERCIAL

## 2023-08-03 VITALS
WEIGHT: 115.81 LBS | SYSTOLIC BLOOD PRESSURE: 117 MMHG | HEART RATE: 50 BPM | BODY MASS INDEX: 20.52 KG/M2 | TEMPERATURE: 97 F | HEIGHT: 63 IN | OXYGEN SATURATION: 94 % | RESPIRATION RATE: 18 BRPM | DIASTOLIC BLOOD PRESSURE: 81 MMHG

## 2023-08-03 LAB — B-HCG UR QL: NEGATIVE

## 2023-08-03 PROCEDURE — 3E0233Z INTRODUCTION OF ANTI-INFLAMMATORY INTO MUSCLE, PERCUTANEOUS APPROACH: ICD-10-PCS | Performed by: ANESTHESIOLOGY

## 2023-08-03 PROCEDURE — 20552 NJX 1/MLT TRIGGER POINT 1/2: CPT | Performed by: ANESTHESIOLOGY

## 2023-08-03 PROCEDURE — 3E023BZ INTRODUCTION OF ANESTHETIC AGENT INTO MUSCLE, PERCUTANEOUS APPROACH: ICD-10-PCS | Performed by: ANESTHESIOLOGY

## 2023-08-03 RX ORDER — METHYLPREDNISOLONE ACETATE 40 MG/ML
INJECTION, SUSPENSION INTRA-ARTICULAR; INTRALESIONAL; INTRAMUSCULAR; SOFT TISSUE
Status: DISCONTINUED | OUTPATIENT
Start: 2023-08-03 | End: 2023-08-03 | Stop reason: HOSPADM

## 2023-08-03 RX ORDER — LIDOCAINE HYDROCHLORIDE 10 MG/ML
INJECTION, SOLUTION EPIDURAL; INFILTRATION; INTRACAUDAL; PERINEURAL
Status: DISCONTINUED | OUTPATIENT
Start: 2023-08-03 | End: 2023-08-03 | Stop reason: HOSPADM

## 2023-08-03 NOTE — OPERATIVE REPORT
[facility]  Operative Report  8/3/2023     [facility] Patient Status:  Hospital Outpatient Surgery    1980 MRN [id number]   Location [address] [address] Attending Sophie Chan MD   Hosp Day # 0 PCP Mariusz Giles MD     Indication: La Casey is a 38year old female with piriformis syndrome    Preoperative Diagnosis:  Piriformis syndrome of right side [G57.01]    Postoperative Diagnosis: Same as above. Procedure performed: PIRIFORMIS MUSCLE INJECTION UNDER FLUOROSCOPY RIGHT with local    Anesthesia: Local     EBL: Less than 1 ml. Procedure Description:  After reviewing the patient's history and performing a focused physical examination, the diagnosis was confirmed and contraindications such as infection and coagulopathy were ruled out. Following review of potential side effects and complications, including but not necessarily limited to infection, allergic reaction, local tissue breakdown, nerve injury, and paresis, the patient indicated they understood and agreed to proceed. After obtaining the informed consent, the patient was brought to the procedure room and monitored. The patient was placed prone on the procedure table. The lumbar and sacral areas were prepped and draped in sterile fashion. Drainage of the corresponding SI joint was identified. A point, 1.5 cm inferior lateral to this was then anesthetized with 5 cc of 1% lidocaine. Adequate analgesia, a 22-gauge spinal needle was advanced with imaging guidance towards the piriformis musculature. After negative aspiration for heme, 1 cc amount of platelet injected outlining the piriformis muscle. Subsequently, a total mixture of 40 mg of triamcinolone with 5 cc of 1% lidocaine was then injected. The patient tolerated procedure well. Complications: None. Follow up: The patient was followed in the pain clinic as needed basis.       Liza Henry MD

## 2023-08-03 NOTE — DISCHARGE INSTRUCTIONS
Home Care Instructions Following Your Pain Procedure     Denita Venegas,  It has been a pleasure to have you as our patient. To help you at home, you must follow these general discharge instructions. We will review these with you before you are discharged. It is our hope that you have a complete and uneventful recovery from our procedure. General Instructions:  What to Expect:  Bandages from your procedure today can be removed when you get home. Please avoid soaking and/or swimming for 24 hours. Showering is okay  It is normal to have increased pain symptoms and/or pain at injection site for up to 3-5 days after procedure, you can use heat or ice (20 minutes on 20 minutes off) for comfort. You may experience some temporary side effects which may include restlessness or insomnia, flushing of the face, or heart palpitations. Please contact the provider if these symptoms do not resolve within 3-4 days. Lightheadedness or nausea may occur and should resolve within 24 to 48 hours. If you develop a headache after treatment, rest, drink fluids (with caffeine, if possible) and take mild over-the-counter pain medication. If the headache does not improve with the above treatment, contact the physician. Home Medications:  Resume all previously prescribed medication. Please avoid taking NSAIDs (Non-Steriodal Anti-Inflammatory Drugs) such as:  Ibuprofen ( Advil, Motrin) Aleve (Naproxen), Diclofenac, Meloxicam for 6 hours after procedure. If you are on Coumadin (Warfarin) or any other anti-coagulant (or \"blood thinning\") medication such as Plavix (Clopidogrel), Xarelto (Rivaroxaban), Eliquis (Apixaban), Effient (Prasugrel) etc., restart on the following day from the procedure unless otherwise directed by your provider. If you are a diabetic, please increase the frequency of your glucose monitoring after the procedure as steroids may cause a temporary (2-3 day) increase in your blood sugar.   Contact your primary care physician if your blood sugar remains elevated as you may require some medication adjustment. Diet:  Resume your regular diet as tolerated. Activity: We recommend that you relax and rest during the rest of your procedure day. If you feel weakness in your arms or legs do not drive. Follow-up Appointment  Please schedule a follow-up visit within 3 to 4 weeks after your last procedure date. Question or Concerns:  Feel free to call our office with any questions or concerns at [phone] (option #2)    Elena Xiao  Thank you for coming to [facility] for your procedure. The nurses try very hard to make sure you receive the best care possible. Your trust in them as well as us is greatly appreciated.     Thanks so much,   Dr. Louisa Mills

## 2023-08-03 NOTE — H&P
History & Physical Examination    Patient Name: Connie Prince  MRN: [id number]  CSN: [id number]  YOB: 1980    Pre-Operative Diagnosis:  Piriformis syndrome of right side [G57.01]    Present Illness: Patient with piriformis syndrome    VYVANSE 50 MG Oral Cap, Take 1 capsule (50 mg total) by mouth every morning., Disp: , Rfl:   methylPREDNISolone (MEDROL) 4 MG Oral Tablet Therapy Pack, Take as directed, Disp: 1 each, Rfl: 0  propranolol 10 MG Oral Tab, Take 1 tablet (10 mg total) by mouth 2 (two) times daily. , Disp: , Rfl:   Melatonin 10 MG Oral Cap, Take 1 capsule by mouth every evening., Disp: , Rfl:   cetirizine 10 MG Oral Tab, Take 1 tablet (10 mg total) by mouth daily. , Disp: , Rfl:       No current facility-administered medications for this encounter. Allergies: No Known Allergies    Past Medical History:   Diagnosis Date    Abnormal uterine bleeding     Decorative tattoo     Diverticulitis 2011     Past Surgical History:   Procedure Laterality Date    CHOLECYSTECTOMY      LAPAROSCOPIC CHOLECYSTECTOMY  07/18/2018    Dr Sara Rojas  03/31/2021    bilateral salpingectomy     Family History   Problem Relation Age of Onset    Cancer Mother         ovarian    Ovarian Cancer Mother 50    Other (Other) Mother         Hep C     Social History    Tobacco Use      Smoking status: Never      Smokeless tobacco: Never    Alcohol use: Yes      Alcohol/week: 0.0 standard drinks of alcohol      Comment: cage done 1/31/2020      SYSTEM Check if Review is Normal Check if Physical Exam is Normal If not normal, please explain:   HEENT [x ] [x ]    NECK & BACK [x ] [x ]    HEART [x ] [x ]    LUNGS [x ] [x ]    ABDOMEN [x ] [x ]    UROGENITAL [x ] [x ]    EXTREMITIES [x ] [x ]    OTHER        [ x ] I have discussed the risks and benefits and alternatives with the patient/family. They understand and agree to proceed with plan of care.   [ x ] I have reviewed the History and Physical done within the last 30 days. Any changes noted above.     Christy Handley MD

## 2023-08-04 ENCOUNTER — TELEPHONE (OUTPATIENT)
Dept: PAIN CLINIC | Facility: CLINIC | Age: 43
End: 2023-08-04

## 2023-08-04 NOTE — TELEPHONE ENCOUNTER
Courtesy called placed to patient for post procedure follow up. Patient stated she is doing okay, not noticing any difference yet in pain level. Educated patient that it takes 3-5 days for the steroid to be effective and to allow adequate time for medication to work. Pt verbalized understanding to call with any questions or concerns.       Procedure: PIRIFORMIS MUSCLE INJECTION UNDER FLUOROSCOPY RIGHT with local   Date: 8/3/23  Follow up Visit Scheduled: 8/23/23

## 2023-08-11 ENCOUNTER — TELEPHONE (OUTPATIENT)
Dept: INTERNAL MEDICINE CLINIC | Facility: CLINIC | Age: 43
End: 2023-08-11

## 2023-08-11 NOTE — TELEPHONE ENCOUNTER
Patient states she feels there are wic near her locations. She was advised to go to [facility] near her location for evaluation and testing for suspected UTI. Patient voiced understanding.  LOV with TB 5/24/2023

## 2023-08-11 NOTE — TELEPHONE ENCOUNTER
Pt in [facility], she started with UTI this am, frequency and burning with urination. She is asking if TB can please call her in a RX [facility]

## 2023-08-23 ENCOUNTER — TELEPHONE (OUTPATIENT)
Dept: PAIN CLINIC | Facility: CLINIC | Age: 43
End: 2023-08-23

## 2023-08-23 ENCOUNTER — OFFICE VISIT (OUTPATIENT)
Dept: PAIN CLINIC | Facility: CLINIC | Age: 43
End: 2023-08-23
Payer: COMMERCIAL

## 2023-08-23 VITALS — SYSTOLIC BLOOD PRESSURE: 110 MMHG | DIASTOLIC BLOOD PRESSURE: 70 MMHG

## 2023-08-23 DIAGNOSIS — G57.01 PIRIFORMIS SYNDROME OF RIGHT SIDE: ICD-10-CM

## 2023-08-23 DIAGNOSIS — M70.71 ISCHIAL BURSITIS OF RIGHT SIDE: Primary | ICD-10-CM

## 2023-08-23 PROCEDURE — 99214 OFFICE O/P EST MOD 30 MIN: CPT | Performed by: ANESTHESIOLOGY

## 2023-08-23 PROCEDURE — 3074F SYST BP LT 130 MM HG: CPT | Performed by: ANESTHESIOLOGY

## 2023-08-23 PROCEDURE — 3078F DIAST BP <80 MM HG: CPT | Performed by: ANESTHESIOLOGY

## 2023-08-23 RX ORDER — MELOXICAM 7.5 MG/1
7.5 TABLET ORAL DAILY
Qty: 14 TABLET | Refills: 0 | Status: SHIPPED | OUTPATIENT
Start: 2023-08-23

## 2023-08-23 RX ORDER — DEXTROAMPHETAMINE SACCHARATE, AMPHETAMINE ASPARTATE, DEXTROAMPHETAMINE SULFATE AND AMPHETAMINE SULFATE 1.875; 1.875; 1.875; 1.875 MG/1; MG/1; MG/1; MG/1
7.5 TABLET ORAL DAILY
COMMUNITY

## 2023-08-23 NOTE — PROGRESS NOTES
Last procedure: PIRIFORMIS MUSCLE INJECTION UNDER FLUOROSCOPY RIGHT with local   Date: 8/3/23  Percentage of relief obtained: 0%  Duration of relief: current    Current Pain Score: 0/10, pain begins after 15 mins of sitting

## 2023-08-23 NOTE — TELEPHONE ENCOUNTER
Prior authorization request completed for: right ischial bursa injection   Authorization # no auth/pre d is needed   Authorization dates: n/a  CPT codes approved: 20610  Number of visits/dates of service approved: 1  Physician: Evan Christine  Location: [facility]  Call Ref#: [id number]  Representative Name: Kashmir Knox: [email or web address]    Patient can be scheduled. Routed to Navigator.

## 2023-08-23 NOTE — PROGRESS NOTES
Name: Chris Redmond   : 1980   DOS: 2023     Pain Clinic Follow Up Visit:   Patient presents with: Follow - Up: PIRIFORMIS MUSCLE INJECTION UNDER FLUOROSCOPY RIGHT with local      Chris Redmond is a 38year old female with a history of piriformis syndrome following up after piriformis injection. The patient denies any significant improvement following the procedure. She complains of pain in the low back along with pain with sitting. Rates this as 5 out of 10. Pt denies any chills, fever, or weakness. There is no bladder or bowel incontinence associated with the pain. REVIEW OF SYSTEMS:  A ten point review of systems was performed with pertinent positives and negatives in the HPI. No Known Allergies    Current Outpatient Medications   Medication Sig Dispense Refill    amphetamine-dextroamphetamine 7.5 MG Oral Tab Take 1 tablet (7.5 mg total) by mouth daily. Meloxicam 7.5 MG Oral Tab Take 1 tablet (7.5 mg total) by mouth daily. 14 tablet 0    VYVANSE 50 MG Oral Cap Take 1 capsule (50 mg total) by mouth every morning. propranolol 10 MG Oral Tab Take 1 tablet (10 mg total) by mouth 2 (two) times daily. Melatonin 10 MG Oral Cap Take 1 capsule by mouth every evening. cetirizine 10 MG Oral Tab Take 1 tablet (10 mg total) by mouth daily. EXAM:   /70 (BP Location: Left arm, Patient Position: Sitting, Cuff Size: adult)   LMP  (LMP Unknown)   General:  Patient is a(n) 38year old year old female in no acute distress. Neurologic[de-identified] WNL-Orientation to time, place and person, normal mood & affect, concentration & attention span intact. Inspection:  Ambulates with well-coordinated, fluid, non-antalgic gait. Gait is normal.  Neck: Full range of motion  Respiratory: Speech is nonlabored  Cranial nerve: Grossly intact  Back: Gait intact. Injection site clear.   Does have tenderness palpation of the ischial bursa  IMAGES:     Intra-Op fluoroscopy reviewed with contrast.  Within the piriformis musculature    ASSESSMENT AND PLAN:   Ischial bursitis of right side  (primary encounter diagnosis)  Piriformis syndrome of right side    The patient is a very pleasant 40-year-old male who presents today for complaints of low back pain. This is primarily on the right side. The patient did have a piriformis muscle injection with modest improvement. Now complains of pain with sitting. This is reproducible with palpation of the ischial bursa. Therefore recommended trial of ischial bursa injection. Additionally, I did send a prescription for meloxicam for symptom management also encouraged the patient to do some piriformis stretching at home. Patient voiced good understanding. Pain is  limiting functional status. Failed conservative treatment consisting of medications, activity modification, and  PT. 1.Risks of long term narcotic use d/w pt including dependence, abuse, and death from overdose and mixing narcotic with alcohol. Pt verbalized understanding. Medications filled today:  Requested Prescriptions     Signed Prescriptions Disp Refills    Meloxicam 7.5 MG Oral Tab 14 tablet 0     Sig: Take 1 tablet (7.5 mg total) by mouth daily. Orders:Orders Placed This Encounter      [address]         Radiology orders and consultations:None  The patient indicates understanding of these issues and agrees to the plan. No follow-ups on file.     Jamil Troy MD, 8/23/2023, 9:04 AM

## 2023-09-12 ENCOUNTER — HOSPITAL ENCOUNTER (OUTPATIENT)
Facility: HOSPITAL | Age: 43
Setting detail: HOSPITAL OUTPATIENT SURGERY
Discharge: HOME | End: 2023-09-12
Attending: ANESTHESIOLOGY | Admitting: ANESTHESIOLOGY
Payer: COMMERCIAL

## 2023-09-12 ENCOUNTER — APPOINTMENT (OUTPATIENT)
Dept: GENERAL RADIOLOGY | Facility: HOSPITAL | Age: 43
End: 2023-09-12
Attending: ANESTHESIOLOGY
Payer: COMMERCIAL

## 2023-09-12 VITALS
RESPIRATION RATE: 16 BRPM | TEMPERATURE: 97 F | BODY MASS INDEX: 20.52 KG/M2 | DIASTOLIC BLOOD PRESSURE: 85 MMHG | SYSTOLIC BLOOD PRESSURE: 121 MMHG | HEART RATE: 72 BPM | OXYGEN SATURATION: 100 % | WEIGHT: 115.81 LBS | HEIGHT: 63 IN

## 2023-09-12 LAB — B-HCG UR QL: NEGATIVE

## 2023-09-12 PROCEDURE — 3E0U33Z INTRODUCTION OF ANTI-INFLAMMATORY INTO JOINTS, PERCUTANEOUS APPROACH: ICD-10-PCS | Performed by: ANESTHESIOLOGY

## 2023-09-12 PROCEDURE — 77002 NEEDLE LOCALIZATION BY XRAY: CPT | Performed by: ANESTHESIOLOGY

## 2023-09-12 PROCEDURE — 3E0U3BZ INTRODUCTION OF ANESTHETIC AGENT INTO JOINTS, PERCUTANEOUS APPROACH: ICD-10-PCS | Performed by: ANESTHESIOLOGY

## 2023-09-12 PROCEDURE — 20610 DRAIN/INJ JOINT/BURSA W/O US: CPT | Performed by: ANESTHESIOLOGY

## 2023-09-12 RX ORDER — LIDOCAINE HYDROCHLORIDE 10 MG/ML
INJECTION, SOLUTION EPIDURAL; INFILTRATION; INTRACAUDAL; PERINEURAL
Status: DISCONTINUED | OUTPATIENT
Start: 2023-09-12 | End: 2023-09-12

## 2023-09-12 RX ORDER — METHYLPREDNISOLONE ACETATE 40 MG/ML
INJECTION, SUSPENSION INTRA-ARTICULAR; INTRALESIONAL; INTRAMUSCULAR; SOFT TISSUE
Status: DISCONTINUED | OUTPATIENT
Start: 2023-09-12 | End: 2023-09-12

## 2023-09-13 ENCOUNTER — TELEPHONE (OUTPATIENT)
Dept: PAIN CLINIC | Facility: CLINIC | Age: 43
End: 2023-09-13

## 2023-09-27 ENCOUNTER — TELEMEDICINE (OUTPATIENT)
Dept: PAIN CLINIC | Facility: CLINIC | Age: 43
End: 2023-09-27
Payer: COMMERCIAL

## 2023-09-27 DIAGNOSIS — M70.71 ISCHIAL BURSITIS OF RIGHT SIDE: Primary | ICD-10-CM

## 2023-09-27 PROCEDURE — 99214 OFFICE O/P EST MOD 30 MIN: CPT | Performed by: ANESTHESIOLOGY

## 2023-09-27 NOTE — PROGRESS NOTES
Telehealth outside of [address] Verbal Consent   I conducted a telehealth visit with Dora Miles today, 23, which was completed using two-way, real-time interactive audio and video communication. This has been done in good omer to provide continuity of care in the best interest of the provider-patient relationship, due to the COVID -19 public health crisis/national emergency where restrictions of face-to-face office visits are ongoing. Every conscious effort was taken to allow for sufficient and adequate time to complete the visit. The patient was made aware of the limitations of the telehealth visit, including treatment limitations as no physical exam could be performed. The patient was advised to call 911 or to go to the ER in case there was an emergency. The patient was also advised of the potential privacy & security concerns related to the telehealth platform. The patient was made aware of where to find [facility] notice of privacy practices, telehealth consent form and other related consent forms and documents. which are located on the [facility] website. The patient verbally agreed to telehealth consent form, related consents and the risks discussed. Lastly, the patient confirmed that they were in [place]. Included in this visit, time may have been spent reviewing labs, medications, radiology tests and decision making. Appropriate medical decision-making and tests are ordered as detailed in the plan of care above. Coding/billing information is submitted for this visit based on complexity of care and/or time spent for the visit. Name: Dora Miles   : 1980   DOS: 2023     Pain Clinic Follow Up Visit:   No chief complaint on file. Dora Miles is a 38year old female with ischial bursitis following up after ischial bursa injection. The patient reports significant improvement in symptoms. She is able to tolerate her 45-minute commute without pain.     Pt denies any chills, fever, or weakness. There is no bladder or bowel incontinence associated with the pain. REVIEW OF SYSTEMS:  A ten point review of systems was performed with pertinent positives and negatives in the HPI. No Known Allergies    Current Outpatient Medications   Medication Sig Dispense Refill    amphetamine-dextroamphetamine 7.5 MG Oral Tab Take 1 tablet (7.5 mg total) by mouth daily. Meloxicam 7.5 MG Oral Tab Take 1 tablet (7.5 mg total) by mouth daily. 14 tablet 0    VYVANSE 50 MG Oral Cap Take 1 capsule (50 mg total) by mouth every morning. propranolol 10 MG Oral Tab Take 1 tablet (10 mg total) by mouth 2 (two) times daily. Melatonin 10 MG Oral Cap Take 1 capsule by mouth every evening. cetirizine 10 MG Oral Tab Take 1 tablet (10 mg total) by mouth daily. EXAM:   LMP  (LMP Unknown)   General:  Patient is a(n) 38year old year old female in no acute distress. Neurologic[de-identified] WNL-Orientation to time, place and person, normal mood & affect, concentration & attention span intact. Inspection:  Ambulates with well-coordinated, fluid, non-antalgic gait. Gait is normal.  Neck: Full range of motion observed  Cranial nerve: Grossly intact  Respiratory: Speech is nonlabored  Back: Gait is intact. Observed seated in no acute distress    IMAGES:     Intra-Op fluoroscopy reviewed consistent with ischial bursa injection    ASSESSMENT AND PLAN:   Ischial bursitis      The patient is a very pleasant 47-year-old female with history of ischial bursitis following up after ischial bursa injection. The patient reports significant functional improvement following the procedure. She no longer limps after her 45-minute commute to work. She is able to sit at work without discomfort. Able to sit on hard surfaces. Overall she is very pleased with results of the procedure. Discussed additional treatment options including home exercise program and use of nonsteroidals.   Patient is welcome to follow-up on as-needed basis. Radiology orders and consultations:None  The patient indicates understanding of these issues and agrees to the plan. No follow-ups on file.     Yamile Mike MD, 9/27/2023, 9:28 AM

## 2023-10-26 ENCOUNTER — TELEPHONE (OUTPATIENT)
Dept: INTERNAL MEDICINE CLINIC | Facility: CLINIC | Age: 43
End: 2023-10-26

## 2023-10-26 NOTE — TELEPHONE ENCOUNTER
Pt apologized for no-showing and is aware of fee  Pt rescheduled on below   Future Appointments   Date Time Provider [place]   1/17/2024  9:00 AM Ty Singleton MD [facility]

## 2023-11-02 ENCOUNTER — TELEPHONE (OUTPATIENT)
Dept: INTERNAL MEDICINE CLINIC | Facility: CLINIC | Age: 43
End: 2023-11-02

## 2023-11-02 DIAGNOSIS — Z12.31 ENCOUNTER FOR SCREENING MAMMOGRAM FOR MALIGNANT NEOPLASM OF BREAST: Primary | ICD-10-CM

## 2023-11-02 NOTE — TELEPHONE ENCOUNTER
Pt had cpe but cancelled it. Pt now scheduled on below and needs an order for levar.  Pt requesting a call back once it's placed for her.    Future Appointments   Date Time Provider Department Center   1/17/2024  9:00 AM Chana Lima MD [facility]

## 2023-11-24 ENCOUNTER — HOSPITAL ENCOUNTER (OUTPATIENT)
Dept: MAMMOGRAPHY | Age: 43
Discharge: HOME | End: 2023-11-24
Attending: INTERNAL MEDICINE
Payer: COMMERCIAL

## 2023-11-24 DIAGNOSIS — Z12.31 ENCOUNTER FOR SCREENING MAMMOGRAM FOR MALIGNANT NEOPLASM OF BREAST: ICD-10-CM

## 2023-11-24 PROCEDURE — 77067 SCR MAMMO BI INCL CAD: CPT | Performed by: INTERNAL MEDICINE

## 2023-11-24 PROCEDURE — 77063 BREAST TOMOSYNTHESIS BI: CPT | Performed by: INTERNAL MEDICINE

## 2024-01-10 ENCOUNTER — OFFICE VISIT (OUTPATIENT)
Dept: PAIN CLINIC | Facility: CLINIC | Age: 44
End: 2024-01-10
Payer: COMMERCIAL

## 2024-01-10 VITALS — SYSTOLIC BLOOD PRESSURE: 122 MMHG | DIASTOLIC BLOOD PRESSURE: 66 MMHG | OXYGEN SATURATION: 100 % | HEART RATE: 76 BPM

## 2024-01-10 DIAGNOSIS — M70.71 ISCHIAL BURSITIS OF RIGHT SIDE: Primary | ICD-10-CM

## 2024-01-10 PROCEDURE — 3074F SYST BP LT 130 MM HG: CPT | Performed by: PHYSICIAN ASSISTANT

## 2024-01-10 PROCEDURE — 3078F DIAST BP <80 MM HG: CPT | Performed by: PHYSICIAN ASSISTANT

## 2024-01-10 PROCEDURE — 99214 OFFICE O/P EST MOD 30 MIN: CPT | Performed by: PHYSICIAN ASSISTANT

## 2024-01-10 NOTE — PATIENT INSTRUCTIONS
Refill policies:    Allow 2-3 business days for refills; controlled substances may take longer.  Contact your pharmacy at least 5 days prior to running out of medication and have them send an electronic request or submit request through the “request refill” option in your [employer] account.  Refills are not addressed on weekends; covering physicians do not authorize routine medications on weekends.  No narcotics or controlled substances are refilled after noon on Fridays or by on call physicians.  By law, narcotics must be electronically prescribed.  A 30 day supply with no refills is the maximum allowed.  If your prescription is due for a refill, you may be due for a follow up appointment.  To best provide you care, patients receiving routine medications need to be seen at least once a year.  Patients receiving narcotic/controlled substance medications need to be seen at least once every 3 months.  In the event that your preferred pharmacy does not have the requested medication in stock (e.g. Backordered), it is your responsibility to find another pharmacy that has the requested medication available.  We will gladly send a new prescription to that pharmacy at your request.    Scheduling Tests:    If your physician has ordered radiology tests such as MRI or CT scans, please contact Central Scheduling at [phone] right away to schedule the test.  Once scheduled, the [facility] Centralized Referral Team will work with your insurance carrier to obtain pre-certification or prior authorization.  Depending on your insurance carrier, approval may take 3-10 days.  It is highly recommended patients assure they have received an authorization before having a test performed.  If test is done without insurance authorization, patient may be responsible for the entire amount billed.      Precertification and Prior Authorizations:  If your physician has recommended that you have a procedure or additional testing performed the [facility]  Centralized Referral Team will contact your insurance carrier to obtain pre-certification or prior authorization.    You are strongly encouraged to contact your insurance carrier to verify that your procedure/test has been approved and is a COVERED benefit.  Although the [facility] Centralized Referral Team does its due diligence, the insurance carrier gives the disclaimer that \"Although the procedure is authorized, this does not guarantee payment.\"    Ultimately the patient is responsible for payment.   Thank you for your understanding in this matter.  Paperwork Completion:  If you require FMLA or disability paperwork for your recovery, please make sure to either drop it off or have it faxed to our office at [phone]. Be sure the form has your name and date of birth on it.  The form will be faxed to our Forms Department and they will complete it for you.  There is a 25$ fee for all forms that need to be filled out.  Please be aware there is a 10-14 day turnaround time.  You will need to sign a release of information (IRWIN) form if your paperwork does not come with one.  You may call the Forms Department with any questions at [phone].  Their fax number is [phone].

## 2024-01-10 NOTE — PROGRESS NOTES
HPI:   Geeta Corcoran presents with complaints of R gluteal pain.    The pain is described as severe aching, stabbing that is intermittent.  The patient’s activity level has remained the same since last visit.  The pain is worst in the late evening.    Changes in condition/history since last visit: Patient is here today for follow-up, having last been seen on 9/27/2023.  At that time, had reported significant improvement following right ischial bursa injection 9/12/2023, and was pleased with her response.  Over the past week, pain began to return, and again is associated with inability to sit for prolonged time.      Last procedure: Right ischial bursa injection    date: 9/12/2023    Percentage of relief experienced from the procedure: 50%    Duration of the relief: 3 months    Previous procedure: Right piriformis injection date: 8/3/2023    Percentage of relief experienced from the procedure: 0%    Duration: N/A    The following activities will increase the patient’s pain: sitting, lying down     The following activities decrease the patient’s pain: changing positions     Functional Assessment: Patient reports that they are able to complete all of their ADL's such as eating, bathing, using the toilet, dressing and getting up from a bed or a chair independently.    Current Medications:  Current Outpatient Medications   Medication Sig Dispense Refill    amphetamine-dextroamphetamine 7.5 MG Oral Tab Take 1 tablet (7.5 mg total) by mouth daily.      VYVANSE 50 MG Oral Cap Take 1 capsule (50 mg total) by mouth every morning.      propranolol 10 MG Oral Tab Take 1 tablet (10 mg total) by mouth 2 (two) times daily.      Melatonin 10 MG Oral Cap Take 1 capsule by mouth every evening.      cetirizine 10 MG Oral Tab Take 1 tablet (10 mg total) by mouth daily.      Meloxicam 7.5 MG Oral Tab Take 1 tablet (7.5 mg total) by mouth daily. (Patient not taking: Reported on 1/10/2024) 14 tablet 0      Patient requires assistance  with: No assistance required    Reviewed Patient History Dated: 9/27/23 no changes noted    Physical Exam:   /66 (BP Location: Right arm, Patient Position: Sitting)   Pulse 76   SpO2 100%   VAS Pain Score:  /10  General Appearance: Well developed, well nourished, normal build, independent body habitus, no apparent physical disabilities, well groomed    Neurological Exam: WNL-Orientation to time, place and person, normal mood & effect, normal concentration & attention span  Inspection: non-antalgic, no acute distress  Pain to palpation R ischial bursa   No focal sensory/motor deficits   Radiology/Lab Test Reviewed: no new imaging  Lab Results   Component Value Date    WBC 12.4 (H) 06/29/2022    WBC 5.8 09/15/2021    WBC 5.1 01/18/2020   No results found for: \"HEMOGLOBIN\"  Lab Results   Component Value Date    .0 06/29/2022    .0 09/15/2021    .0 01/18/2020     Do you have any known blood/bleeding disorders?  No  Does patient currently take blood thinners?   None  Does patient currently take any antibiotics?   No  Patient educated and verbalized understanding.  Medical Decision Making:   Diagnosis:    Encounter Diagnosis   Name Primary?    Ischial bursitis of right side Yes     Impression: Moderate relief with right ischial bursa injection on 9/12/2023, and was 50% improved.  Better able to tolerate her day-to-day activities, and was \"good enough\" for just over 3 months.  Over the past 1 to 2 weeks, pain has again returned, and is again disrupting her quality of life, specifically sitting activities and sleeping.  Wishes a repeat procedure, an order is placed for repeat ischial bursa injection.  In addition, asked that she consider return to physical therapy, order was placed.    Plan: Patient to schedule the following injection: Right ischial bursa injection Levels: N/A, Procedure and risks were discussed with pt. including headache, bleeding, infection and potential nerve damage.  Order  and to return to physical therapy with a focus on HEP.  Follow-up 2 to 3 weeks.    No orders of the defined types were placed in this encounter.      Meds & Refills for this Visit:  Requested Prescriptions      No prescriptions requested or ordered in this encounter       Imaging & Consults:  None    The patient indicates understanding of these issues and agrees to the plan.    SOTO Campos

## 2024-01-10 NOTE — PROGRESS NOTES
Patient presents in office today with reported pain in right lower back/ gluteal pain    Has had right ischial bursa injection on 9/12/23 and Right piriformis muscle injection on 8/3/23.    Pt states pain resumed back in Oct 2023 and became worse last week    Current pain level reported = 0/10, pain rises when sitting for prolong period of time, gets top 10/10    Last reported dose of NA today      Narcotic Contract renewal NA    Urine Drug screen NA

## 2024-01-11 ENCOUNTER — TELEPHONE (OUTPATIENT)
Dept: PAIN CLINIC | Facility: CLINIC | Age: 44
End: 2024-01-11

## 2024-01-11 DIAGNOSIS — M70.71 ISCHIAL BURSITIS OF RIGHT SIDE: Primary | ICD-10-CM

## 2024-01-11 NOTE — TELEPHONE ENCOUNTER
Patient advised of insurance approval to proceed with injections and is agreeable to scheduling. Patient scheduled for procedure, pre-procedure instructions reviewed. Patient prefers Local sedation. Reviewed sedation instructions including No Fasting,No  and No Covid Test Required. Patient encouraged but not required to hold Meloxicam for 24 hours prior to procedure. Patient verbalized understanding of instructions, no further needs at this time.      [facility]  PRE-PROCEDURE INSTRUCTIONS WITHOUT SEDATION    Procedure: Right Ischial Bursa Injection       Appointment Date: 01/23/2024  Check-In Time: 09:00 AM      Prior to the procedure:  Please update us prior to the procedure if you are experiencing any symptoms of infection such as cough, fever, chills, urinary symptoms, or have recently been prescribed antibiotics, have open wounds, have recently had surgery or dental procedures.    Day of Procedure:  **Drivers will be required for patients who receive prescriptions for Valium.    NO FASTING REQUIRED  Please bring your Insurance Card, Photo ID, List of Current Medications and Referral (if applicable) to your appointment.  Please park in the [facility] and follow the signs to the [facility].  Check in at [facility] ([address] [place] [address]) outpatient registration in the [facility].  Please note-No prescriptions will be written by Pain Clinic in [place] on the day of procedure. If you require a refill of medications, please contact the office 48 hours prior to your procedure.  If you have an implanted Spinal Cord or Peripheral Nerve Stimulator: Please remember to turn device off for procedure.        Medication Hold:    Number of days you need to be off for the following medications:    Aggrenox 10 days   Agrylin (Anagrelide) 10 days  Brilinta (Ticagrelor) 7 days  Imbruvica (Ibrutinib) 3 days   Enbrel (Etanercept) 24 hours   Fragmin (Dalteparin) 24 hours   Pletal  (Cilostazol) 7 days  Effient (Prasugrel) 7 days  Pradaxa 10 days  Trental 7 days  Eliquis (Apixaban) 3 days  Xarelto (Rivaroxaban) 3 days  Lovenox (Enoxaparin) 24 hours  Aspirin  Greater than 81mg but less than 325mg   5 days  325mg and greater                  7 days  Coumadin       5 days  Procedure may be cancelled if INR is elevated.   Excedrin (with aspirin) 7 days  Plavix (Clopidogrel)                            7 days    NSAIDs: 24 hours preferred      Ibuprofen (Motrin, Advil, Vicoprofen), Naproxen (Naprosyn, Aleve), Piroxcam (Feldene), Meloxicam (Mobic), Oxaprozin (Daypro), Diclofenac (Voltaren), Indomethacin (Indocin), Etodolac (Lodine), Nabumetone (Relafen), Celebrex (Celecoxib)           HERBAL SUPPLEMENTS  5 days preferred  Fish oil, krill oil, Omega-3, Vascepa, Vitamin E, Turmeric, Garlic                       Insurance Authorization:   Most insurances are now requiring a preauthorization for all procedures.  In the event that your insurance does not authorize your procedure within 48 hours of the scheduled date, your procedure will be cancelled and rescheduled to a later date.  Please contact your insurance carrier to determine what your financial responsibility will be for the procedure(s).      Cancellation/Rescheduling Appointment:   In the event you need to cancel or reschedule your appointment, you must notify the office 24 hours prior.    Post-procedure instructions:        Please schedule a follow up visit within 2 to 4 weeks after your last procedure date   Please call our office with any questions or concerns before or after your procedure at  [phone].  If you are a diabetic, please increase the frequency of your glucose monitoring after the procedure as this may cause a temporary increase in your blood sugar.  Contact your primary care physician if your blood sugar rises as you may require some medication adjustment.  It is normal to have increased pain at injection site for up to 3-5  days after procedure, you can use heat or ice (20 minutes on 20 minutes off) for comfort.    **To hear a recorded version of these instructions, please call [phone] and follow the prompts.  **Para escuchar las instrucciones en Español, por favor de llamar el roderick [phone] opción 4.

## 2024-01-11 NOTE — TELEPHONE ENCOUNTER
Prior authorization request completed for:  right ischial bursa inj  Authorization # no auth needed   Pre-D: no  Exclusions/Restrictions: no  Covered Benefit: yes   Authorization dates: n/a  CPT codes approved: 20610  Number of visits/dates of service approved: 1  Physician: roxana  Location: [facility]   Call Ref#: venessa v 1/11/24  12:49 est  Representative Name: [facility]   Insurance Carrier: [phone]    Patient can be scheduled. Routed to Navigator.

## 2024-01-16 ENCOUNTER — LAB ENCOUNTER (OUTPATIENT)
Dept: LAB | Age: 44
End: 2024-01-16
Attending: INTERNAL MEDICINE
Payer: COMMERCIAL

## 2024-01-16 DIAGNOSIS — Z00.00 ROUTINE GENERAL MEDICAL EXAMINATION AT A HEALTH CARE FACILITY: ICD-10-CM

## 2024-01-16 DIAGNOSIS — Z13.0 SCREENING FOR DISORDER OF BLOOD AND BLOOD-FORMING ORGANS: ICD-10-CM

## 2024-01-16 DIAGNOSIS — Z13.220 SCREENING FOR LIPID DISORDERS: ICD-10-CM

## 2024-01-16 DIAGNOSIS — Z13.29 SCREENING FOR THYROID DISORDER: ICD-10-CM

## 2024-01-16 DIAGNOSIS — Z13.228 SCREENING FOR METABOLIC DISORDER: ICD-10-CM

## 2024-01-16 LAB
ALBUMIN SERPL-MCNC: 4 G/DL (ref 3.4–5)
ALBUMIN/GLOB SERPL: 1.2 {RATIO} (ref 1–2)
ALP LIVER SERPL-CCNC: 70 U/L
ALT SERPL-CCNC: 44 U/L
ANION GAP SERPL CALC-SCNC: 2 MMOL/L (ref 0–18)
AST SERPL-CCNC: 34 U/L (ref 15–37)
BASOPHILS # BLD AUTO: 0.09 X10(3) UL (ref 0–0.2)
BASOPHILS NFR BLD AUTO: 2 %
BILIRUB SERPL-MCNC: 0.3 MG/DL (ref 0.1–2)
BUN BLD-MCNC: 15 MG/DL (ref 9–23)
CALCIUM BLD-MCNC: 9.4 MG/DL (ref 8.5–10.1)
CHLORIDE SERPL-SCNC: 107 MMOL/L (ref 98–112)
CHOLEST SERPL-MCNC: 241 MG/DL (ref ?–200)
CO2 SERPL-SCNC: 29 MMOL/L (ref 21–32)
CREAT BLD-MCNC: 0.86 MG/DL
EGFRCR SERPLBLD CKD-EPI 2021: 85 ML/MIN/1.73M2 (ref 60–?)
EOSINOPHIL # BLD AUTO: 0.19 X10(3) UL (ref 0–0.7)
EOSINOPHIL NFR BLD AUTO: 4.1 %
ERYTHROCYTE [DISTWIDTH] IN BLOOD BY AUTOMATED COUNT: 12.4 %
FASTING PATIENT LIPID ANSWER: YES
FASTING STATUS PATIENT QL REPORTED: YES
GLOBULIN PLAS-MCNC: 3.3 G/DL (ref 2.8–4.4)
GLUCOSE BLD-MCNC: 85 MG/DL (ref 70–99)
HCT VFR BLD AUTO: 38.8 %
HDLC SERPL-MCNC: 119 MG/DL (ref 40–59)
HGB BLD-MCNC: 12.7 G/DL
IMM GRANULOCYTES # BLD AUTO: 0 X10(3) UL (ref 0–1)
IMM GRANULOCYTES NFR BLD: 0 %
LDLC SERPL CALC-MCNC: 113 MG/DL (ref ?–100)
LYMPHOCYTES # BLD AUTO: 2.11 X10(3) UL (ref 1–4)
LYMPHOCYTES NFR BLD AUTO: 46 %
MCH RBC QN AUTO: 28.7 PG (ref 26–34)
MCHC RBC AUTO-ENTMCNC: 32.7 G/DL (ref 31–37)
MCV RBC AUTO: 87.8 FL
MONOCYTES # BLD AUTO: 0.32 X10(3) UL (ref 0.1–1)
MONOCYTES NFR BLD AUTO: 7 %
NEUTROPHILS # BLD AUTO: 1.88 X10 (3) UL (ref 1.5–7.7)
NEUTROPHILS # BLD AUTO: 1.88 X10(3) UL (ref 1.5–7.7)
NEUTROPHILS NFR BLD AUTO: 40.9 %
NONHDLC SERPL-MCNC: 122 MG/DL (ref ?–130)
OSMOLALITY SERPL CALC.SUM OF ELEC: 286 MOSM/KG (ref 275–295)
PLATELET # BLD AUTO: 228 10(3)UL (ref 150–450)
POTASSIUM SERPL-SCNC: 4.1 MMOL/L (ref 3.5–5.1)
PROT SERPL-MCNC: 7.3 G/DL (ref 6.4–8.2)
RBC # BLD AUTO: 4.42 X10(6)UL
SODIUM SERPL-SCNC: 138 MMOL/L (ref 136–145)
TRIGL SERPL-MCNC: 54 MG/DL (ref 30–149)
TSI SER-ACNC: 3.25 MIU/ML (ref 0.36–3.74)
VLDLC SERPL CALC-MCNC: 9 MG/DL (ref 0–30)
WBC # BLD AUTO: 4.6 X10(3) UL (ref 4–11)

## 2024-01-16 PROCEDURE — 80061 LIPID PANEL: CPT

## 2024-01-16 PROCEDURE — 85025 COMPLETE CBC W/AUTO DIFF WBC: CPT

## 2024-01-16 PROCEDURE — 36415 COLL VENOUS BLD VENIPUNCTURE: CPT

## 2024-01-16 PROCEDURE — 84443 ASSAY THYROID STIM HORMONE: CPT

## 2024-01-16 PROCEDURE — 80053 COMPREHEN METABOLIC PANEL: CPT

## 2024-01-17 ENCOUNTER — OFFICE VISIT (OUTPATIENT)
Dept: INTERNAL MEDICINE CLINIC | Facility: CLINIC | Age: 44
End: 2024-01-17
Payer: COMMERCIAL

## 2024-01-17 VITALS
SYSTOLIC BLOOD PRESSURE: 116 MMHG | DIASTOLIC BLOOD PRESSURE: 70 MMHG | WEIGHT: 116.63 LBS | BODY MASS INDEX: 20.66 KG/M2 | HEART RATE: 71 BPM | RESPIRATION RATE: 18 BRPM | TEMPERATURE: 97 F | OXYGEN SATURATION: 99 % | HEIGHT: 63 IN

## 2024-01-17 DIAGNOSIS — M70.71 ISCHIAL BURSITIS OF RIGHT SIDE: ICD-10-CM

## 2024-01-17 DIAGNOSIS — Z12.11 SCREEN FOR COLON CANCER: ICD-10-CM

## 2024-01-17 DIAGNOSIS — Z00.00 ROUTINE GENERAL MEDICAL EXAMINATION AT A HEALTH CARE FACILITY: Primary | ICD-10-CM

## 2024-01-17 PROCEDURE — 3008F BODY MASS INDEX DOCD: CPT | Performed by: INTERNAL MEDICINE

## 2024-01-17 PROCEDURE — 3074F SYST BP LT 130 MM HG: CPT | Performed by: INTERNAL MEDICINE

## 2024-01-17 PROCEDURE — 99396 PREV VISIT EST AGE 40-64: CPT | Performed by: INTERNAL MEDICINE

## 2024-01-17 PROCEDURE — 3078F DIAST BP <80 MM HG: CPT | Performed by: INTERNAL MEDICINE

## 2024-01-17 RX ORDER — DEXTROAMPHETAMINE SACCHARATE, AMPHETAMINE ASPARTATE, DEXTROAMPHETAMINE SULFATE AND AMPHETAMINE SULFATE 3.75; 3.75; 3.75; 3.75 MG/1; MG/1; MG/1; MG/1
15 TABLET ORAL
COMMUNITY
Start: 2023-12-26

## 2024-01-17 NOTE — PROGRESS NOTES
Chief Complaint   Patient presents with    Well Adult     EJ RM 3- Pt is here for yearly physical       HPI:    Patient here for CPE  Sees gyne and utd on pap and mammogram  Due for cscope this year- referral given to [facility]  Suffering from right ischial burisitis pain for months, has order for PT from pain service and another injection planned later this month  Follows with psychiatry since 2021 for ADD and anxiety  She had flu vaccine and covid 19 latest booster (not in out records)  Works out 6 days a week, eats healthy.       Review of Systems   Constitutional: Negative for fever  HENT: Negative for hearing loss, congestion  Eyes: Negative for pain and visual disturbance.   Respiratory: Negative for cough, chest tightness  Cardiovascular: Negative for chest pain, palpitations  Gastrointestinal: Negative for nausea, vomiting  Genitourinary: Negative for dysuria, hematuria   Skin: Negative for color change and rash.   Neurological: Negative for dizziness, syncope  Hematological: Negative for adenopathy.   Psychiatric/Behavioral: as above    Patient Active Problem List   Diagnosis    Acute cholecystitis    Acute right-sided low back pain    Radicular pain of right lower extremity    Lumbosacral radiculopathy    Sterilization consult    Piriformis syndrome of right side    Diarrhea of presumed infectious origin       Past Medical History:   Diagnosis Date    Abnormal uterine bleeding     Decorative tattoo     Diverticulitis 2011     Past Surgical History:   Procedure Laterality Date    CHOLECYSTECTOMY      LAPAROSCOPIC CHOLECYSTECTOMY  07/18/2018    Dr Barnhart    LAPAROSCOPY,REMOVE ADNEXA  03/31/2021    bilateral salpingectomy     Family History   Problem Relation Age of Onset    Cancer Mother         ovarian    Ovarian Cancer Mother 48    Other (Other) Mother         Hep C     Social History     Socioeconomic History    Marital status:    Tobacco Use    Smoking status: Never    Smokeless tobacco: Never   Vaping  Use    Vaping Use: Never used   Substance and Sexual Activity    Alcohol use: Yes     Alcohol/week: 0.0 standard drinks of alcohol     Comment: cage done 1/31/2020    Drug use: No     Comment: CBD occ    Sexual activity: Yes     Partners: Male     Comment: BSO  3/31/21   Other Topics Concern    Caffeine Concern Yes     Comment: one cup of coffee    Exercise No     Comment: NA currently    Seat Belt Yes       Current Outpatient Medications   Medication Sig Dispense Refill    amphetamine-dextroamphetamine 15 MG Oral Tab Take 1 tablet (15 mg total) by mouth Noon.      VYVANSE 50 MG Oral Cap Take 1 capsule (50 mg total) by mouth every morning.      propranolol 10 MG Oral Tab Take 1 tablet (10 mg total) by mouth 2 (two) times daily.      Melatonin 10 MG Oral Cap Take 1 capsule by mouth every evening.      cetirizine 10 MG Oral Tab Take 1 tablet (10 mg total) by mouth daily.      amphetamine-dextroamphetamine 7.5 MG Oral Tab Take 1 tablet (7.5 mg total) by mouth daily. (Patient not taking: Reported on 1/17/2024)         Allergies  No Known Allergies    Health Maintenance  Immunizations:  Immunization History   Administered Date(s) Administered    Covid-19 Vaccine Moderna 100 mcg/0.5 ml 02/11/2021, 03/11/2021    Covid-19 Vaccine Pfizer Bivalent 30mcg/0.3mL 10/08/2022    FLU VAC QIV SPLIT 3 YRS AND OLDER (77709) 10/23/2020    FLULAVAL 6 months & older 0.5 ml Prefilled syringe (67889) 11/15/2017, 01/21/2019    FLUZONE 6 months and older PFS 0.5 ml (68380) 11/15/2017, 01/21/2019, 10/23/2022    Influenza 10/01/2019, 10/23/2020, 11/20/2021    TDAP 11/15/2017    Tb Intradermal Test 08/16/2018         Physical Exam  /70   Pulse 71   Temp 97.4 °F (36.3 °C) (Temporal)   Resp 18   Ht 5' 3\" (1.6 m)   Wt 116 lb 9.6 oz (52.9 kg)   LMP  (LMP Unknown)   SpO2 99%   BMI 20.65 kg/m²   Constitutional: Oriented to person, place, and time. No distress.   HEENT:  Normocephalic and atraumatic. Hearing and tympanic membranes normal.    Eyes: Conjunctivae and EOM are normal. PERRLA. No scleral icterus.   Neck: Normal range of motion. Neck supple.   Cardiovascular: Normal rate, regular rhythm and intact distal pulses.    Pulmonary/Chest: Effort normal and breath sounds normal.   Abdominal: Soft. Bowel sounds are normal. Non tender, ND  Neurological: No cranial nerve deficit or sensory deficit. Normal muscle tone. Coordination normal.   Skin: Skin is warm and dry.   Psychiatric: Normal mood and affect.     A/P:    Encounter Diagnoses   Name     Screen for colon cancer     Routine general medical examination at a health care facility- referred for colonoscopy, she is utd on mammogram and pap (sees gyne), continue healthy diet and regular exercise. She is utd on flu vaccine and covid 19 booster per patient (we do not have records)     Ischial bursitis of right side- start PT and f/u pain service as directed        No orders of the defined types were placed in this encounter.      Meds & Refills for this Visit:  Requested Prescriptions      No prescriptions requested or ordered in this encounter       Imaging & Consults:  GASTRO - INTERNAL      Return if symptoms worsen or fail to improve.    There are no Patient Instructions on file for this visit.    All questions were answered and the patient understands the plan.

## 2024-01-29 ENCOUNTER — APPOINTMENT (OUTPATIENT)
Dept: GENERAL RADIOLOGY | Facility: HOSPITAL | Age: 44
End: 2024-01-29
Attending: ANESTHESIOLOGY
Payer: COMMERCIAL

## 2024-01-29 ENCOUNTER — HOSPITAL ENCOUNTER (OUTPATIENT)
Facility: HOSPITAL | Age: 44
Setting detail: HOSPITAL OUTPATIENT SURGERY
Discharge: HOME | End: 2024-01-29
Attending: ANESTHESIOLOGY | Admitting: ANESTHESIOLOGY
Payer: COMMERCIAL

## 2024-01-29 VITALS
TEMPERATURE: 98 F | BODY MASS INDEX: 20.66 KG/M2 | OXYGEN SATURATION: 100 % | DIASTOLIC BLOOD PRESSURE: 63 MMHG | RESPIRATION RATE: 18 BRPM | HEART RATE: 85 BPM | SYSTOLIC BLOOD PRESSURE: 120 MMHG | HEIGHT: 63 IN | WEIGHT: 116.63 LBS

## 2024-01-29 PROCEDURE — 20610 DRAIN/INJ JOINT/BURSA W/O US: CPT | Performed by: ANESTHESIOLOGY

## 2024-01-29 PROCEDURE — 77002 NEEDLE LOCALIZATION BY XRAY: CPT | Performed by: ANESTHESIOLOGY

## 2024-01-29 PROCEDURE — 3E0U33Z INTRODUCTION OF ANTI-INFLAMMATORY INTO JOINTS, PERCUTANEOUS APPROACH: ICD-10-PCS | Performed by: ANESTHESIOLOGY

## 2024-01-29 RX ORDER — METHYLPREDNISOLONE ACETATE 40 MG/ML
INJECTION, SUSPENSION INTRA-ARTICULAR; INTRALESIONAL; INTRAMUSCULAR; SOFT TISSUE
Status: DISCONTINUED | OUTPATIENT
Start: 2024-01-29 | End: 2024-01-29

## 2024-01-29 RX ORDER — ONDANSETRON 2 MG/ML
4 INJECTION INTRAMUSCULAR; INTRAVENOUS ONCE AS NEEDED
Status: DISCONTINUED | OUTPATIENT
Start: 2024-01-29 | End: 2024-01-29

## 2024-01-29 RX ORDER — SODIUM CHLORIDE, SODIUM LACTATE, POTASSIUM CHLORIDE, CALCIUM CHLORIDE 600; 310; 30; 20 MG/100ML; MG/100ML; MG/100ML; MG/100ML
100 INJECTION, SOLUTION INTRAVENOUS CONTINUOUS
Status: DISCONTINUED | OUTPATIENT
Start: 2024-01-29 | End: 2024-01-29

## 2024-01-29 RX ORDER — LIDOCAINE HYDROCHLORIDE 10 MG/ML
INJECTION, SOLUTION EPIDURAL; INFILTRATION; INTRACAUDAL; PERINEURAL
Status: DISCONTINUED | OUTPATIENT
Start: 2024-01-29 | End: 2024-01-29

## 2024-01-29 NOTE — OPERATIVE REPORT
[facility]  Operative Report  2024     Geeta Corcoran Patient Status:  Hospital Outpatient Surgery    1980 MRN [id number]   Location [facility] Attending Brando Quintanilla MD   Hosp Day # 0 PCP Chana Lima MD     Indication: Geeta is a 43 year old female with ischial bursitis    Preoperative Diagnosis:  Ischial bursitis of right side [M70.71]    Postoperative Diagnosis: Same as above.    Procedure performed: RIGHT ISCHIAL BURSA INJECTION with local    Anesthesia: Local  .    EBL: Less than 1 ml.    Procedure Description:  After reviewing the patient's history and performing a focused physical examination, the diagnosis was confirmed and contraindications such as infection and coagulopathy were ruled out.  Following review of potential side effects and complications, including but not necessarily limited to infection, allergic reaction, local tissue breakdown, nerve injury, and paresis, the patient indicated they understood and agreed to proceed.  After obtaining the informed consent, the patient was brought to the procedure room and monitored.      The patient was placed prone on the procedure table.  The lumbar and sacral areas were prepped and draped in sterile fashion.  The inferior border of the right ischium was identified.  The overlying soft tissue was anesthetized with 5 cc of 1% lidocaine.  After adequate skin analgesia, a 22-gauge spinal needle was advanced with imaging guidance contact bone.  After repeat confirmation bony contact and negative aspiration, a total mixture of 40 mg of Depo-Medrol with 5 cc 1% lidocaine was injected easily.  The needle was then withdrawn tip intact.  Patient tolerated procedure well.  No objective subjective weakness. The patient was observed until discharge criteria met.  Discharge instructions were given and patient was released to a responsible adult.    Complications: None.    Follow up: The patient was followed in the pain  clinic as needed basis.      Brando Quintanilla MD

## 2024-01-29 NOTE — H&P
History & Physical Examination    Patient Name: Geeta Corcoran  MRN: [id number]  [facility]: [id number]  YOB: 1980    Pre-Operative Diagnosis:  Ischial bursitis of right side [M70.71]    Present Illness: Ischial bursitis    Medications Prior to Admission   Medication Sig Dispense Refill Last Dose    amphetamine-dextroamphetamine 15 MG Oral Tab Take 1 tablet (15 mg total) by mouth Noon.   1/28/2024    amphetamine-dextroamphetamine 7.5 MG Oral Tab Take 1 tablet (7.5 mg total) by mouth daily.   Past Week    VYVANSE 50 MG Oral Cap Take 1 capsule (50 mg total) by mouth every morning.   1/28/2024    propranolol 10 MG Oral Tab Take 1 tablet (10 mg total) by mouth 2 (two) times daily.   1/28/2024    Melatonin 10 MG Oral Cap Take 1 capsule by mouth every evening.   1/28/2024    cetirizine 10 MG Oral Tab Take 1 tablet (10 mg total) by mouth daily.   Past Month     Current Facility-Administered Medications   Medication Dose Route Frequency    lactated ringers infusion  100 mL/hr Intravenous Continuous    ondansetron (Zofran) 4 MG/2ML injection 4 mg  4 mg Intravenous Once PRN       Allergies: No Known Allergies    Past Medical History:   Diagnosis Date    Abnormal uterine bleeding     Decorative tattoo     Diverticulitis 2011     Past Surgical History:   Procedure Laterality Date    CHOLECYSTECTOMY      LAPAROSCOPIC CHOLECYSTECTOMY  07/18/2018    Dr Barnhart    LAPAROSCOPY,REMOVE ADNEXA  03/31/2021    bilateral salpingectomy     Family History   Problem Relation Age of Onset    Cancer Mother         ovarian    Ovarian Cancer Mother 48    Other (Other) Mother         Hep C     Social History     Tobacco Use    Smoking status: Never    Smokeless tobacco: Never   Substance Use Topics    Alcohol use: Yes     Alcohol/week: 0.0 standard drinks of alcohol     Comment: cage done 1/31/2020       SYSTEM Check if Review is Normal Check if Physical Exam is Normal If not normal, please explain:   HEENT [x ] [x ]    NECK & BACK [x ] [x ]     HEART [x ] [x ]    LUNGS [x ] [x ]    ABDOMEN [x ] [x ]    UROGENITAL [x ] [x ]    EXTREMITIES [x ] [x ]    OTHER        [ x ] I have discussed the risks and benefits and alternatives with the patient/family.  They understand and agree to proceed with plan of care.  [ x ] I have reviewed the History and Physical done within the last 30 days.  Any changes noted above.    Brando Quintanilla MD

## 2024-01-30 ENCOUNTER — TELEPHONE (OUTPATIENT)
Dept: PAIN CLINIC | Facility: CLINIC | Age: 44
End: 2024-01-30

## 2024-01-30 NOTE — TELEPHONE ENCOUNTER
Courtesy called placed to patient for post procedure follow up. Patient stated \"I'm good\". Informed patient that soreness is to be expected after the procedure. Educated patient that it takes 3-5 days for the steroid to be effective and to allow adequate time for medication to work. Encouraged patient to alternate ice and heat and to take medications as prescribed. Pt verbalized understanding to call with any questions or concerns.      Procedure: RIGHT ISCHIAL BURSA INJECTION   Date: 1/29/2024  Follow up Visit Scheduled: 2/14/2024 with     intact

## 2024-02-14 ENCOUNTER — TELEMEDICINE (OUTPATIENT)
Dept: PAIN CLINIC | Facility: CLINIC | Age: 44
End: 2024-02-14
Payer: COMMERCIAL

## 2024-02-14 DIAGNOSIS — M54.16 LUMBAR RADICULOPATHY: Primary | ICD-10-CM

## 2024-02-14 PROCEDURE — 99214 OFFICE O/P EST MOD 30 MIN: CPT | Performed by: ANESTHESIOLOGY

## 2024-02-14 NOTE — PROGRESS NOTES
Telehealth outside of [facility]  Telehealth Verbal Consent   I conducted a telehealth visit with Geeta Corcoran today, 24, which was completed using two-way, real-time interactive audio and video communication. This has been done in good omer to provide continuity of care in the best interest of the provider-patient relationship, due to the COVID -19 public health crisis/national emergency where restrictions of face-to-face office visits are ongoing. Every conscious effort was taken to allow for sufficient and adequate time to complete the visit.  The patient was made aware of the limitations of the telehealth visit, including treatment limitations as no physical exam could be performed.  The patient was advised to call 911 or to go to the ER in case there was an emergency.  The patient was also advised of the potential privacy & security concerns related to the telehealth platform.   The patient was made aware of where to find [facility]'s notice of privacy practices, telehealth consent form and other related consent forms and documents.  which are located on the [facility] website. The patient verbally agreed to telehealth consent form, related consents and the risks discussed.    Lastly, the patient confirmed that they were in [place].   Included in this visit, time may have been spent reviewing labs, medications, radiology tests and decision making. Appropriate medical decision-making and tests are ordered as detailed in the plan of care above.  Coding/billing information is submitted for this visit based on complexity of care and/or time spent for the visit.  Name: Geeta Corcoran   : 1980   DOS: 2024     Pain Clinic Follow Up Visit:   No chief complaint on file.      Geeta Corcoran is a 43 year old female with a history of ischial bursitis here for follow-up after right ischial bursa injection.  The patient does not endorse any significant improvement.  Additionally, patient has new symptoms which  include numbness in her shin.  This is most significant upon laying down at night.  Does not complain of any pain radiating towards the groin.  No difficulty with ambulation.    Pt denies any chills, fever, or weakness. There is no bladder or bowel incontinence associated with the pain.    REVIEW OF SYSTEMS:  A ten point review of systems was performed with pertinent positives and negatives in the HPI.    No Known Allergies    Current Outpatient Medications   Medication Sig Dispense Refill    amphetamine-dextroamphetamine 15 MG Oral Tab Take 1 tablet (15 mg total) by mouth Noon.      amphetamine-dextroamphetamine 7.5 MG Oral Tab Take 1 tablet (7.5 mg total) by mouth daily.      VYVANSE 50 MG Oral Cap Take 1 capsule (50 mg total) by mouth every morning.      propranolol 10 MG Oral Tab Take 1 tablet (10 mg total) by mouth 2 (two) times daily.      Melatonin 10 MG Oral Cap Take 1 capsule by mouth every evening.      cetirizine 10 MG Oral Tab Take 1 tablet (10 mg total) by mouth daily.           EXAM:   LMP  (LMP Unknown)   General:  Patient is a(n) 43 year old year old female in no acute distress.  Neurologic:: WNL-Orientation to time, place and person, normal mood & affect, concentration & attention span intact.   Inspection:  Ambulates with well-coordinated, fluid, non-antalgic gait.  Gait is normal.  Neck: Full range of motion observed  Cranial nerve: Grossly intact  Respiratory: Speech is nonlabored  Skin: Warm, dry  Back: Observed seated comfortably.  Gait intact    IMAGES:   Intra fluoroscopy reviewed which is consistent with injection of the ischial bursa      ASSESSMENT AND PLAN:     1. Lumbar radiculopathy      The patient is very pleasant 43-year-old female with a history of ischial bursitis.  She is status post ischial bursa injection without symptomatic improvement.  Now also complains of some numbness and tingling in the leg.  This may be consistent with lumbar radiculopathy.    Last advanced imaging was  from 2020.  Given that she has failed conservative management and has new radicular symptoms, will update lumbar MRI.  Encourage patient to continue with physical therapy.  Patient can follow-up after MRI is completed.      Radiology orders and consultations:MRI SPINE LUMBAR (CPT=72148)  The patient indicates understanding of these issues and agrees to the plan.  No follow-ups on file.    Brando Quintanilla MD, 2/14/2024, 10:23 AM

## 2024-02-22 ENCOUNTER — TELEPHONE (OUTPATIENT)
Dept: PHYSICAL THERAPY | Facility: HOSPITAL | Age: 44
End: 2024-02-22

## 2024-02-23 ENCOUNTER — OFFICE VISIT (OUTPATIENT)
Dept: PHYSICAL THERAPY | Age: 44
End: 2024-02-23
Attending: PHYSICIAN ASSISTANT
Payer: COMMERCIAL

## 2024-02-23 DIAGNOSIS — M70.71 ISCHIAL BURSITIS OF RIGHT SIDE: Primary | ICD-10-CM

## 2024-02-23 PROCEDURE — 97110 THERAPEUTIC EXERCISES: CPT

## 2024-02-23 PROCEDURE — 97161 PT EVAL LOW COMPLEX 20 MIN: CPT

## 2024-02-23 NOTE — PROGRESS NOTES
SPINE EVALUATION:     Diagnosis:   Ischial bursitis of right side (M70.71), right sided back/lumbar radiculitis     Referring Provider: Dalton  Date of Evaluation:    2/23/2024    Precautions:  None Next MD visit:   none scheduled  Date of Surgery: n/a     PATIENT SUMMARY   Geeta Corcoran is a 43 year old female who presents to therapy today with complaints of right low back pain that travels into right buttock, side of right leg  to outside of mid calf described as sharp, and sometimes she has pins and needles in her 1st and 2nd toes.     She has had pain on/off for a few years for no specific reason. Pain started increasing after her steroid injection in her ischial area jan 17.  She did have a piriformis injection a couple of years ago and it did help then. She is scheduled for an MRI next week.     Pt describes pain level current 7/10, at best 4/10, at worst 9/10.     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 She is in the office most of the time-a lot of computer work. She does have a standing desk. She goes into the classrooms about 3x a week for approx 30 min each time. She use to exercise 6x a week approx  30 min each time high intensity training  (cardio weight lifting fast movements). She has not been working out since December due pain. Pt has a 6 yr and 9 year old kids    Current functional limitations include pain and difficulty with sitting/driving  longer than 5 min, standing longer than 30 min, walking is usually the best but she still has a limp. Bending forward towards the ground, picking up toys off ground, doing laundry, standing after prolonged sitting/driving.      Geeta describes prior level of function no pain/difficulty with above mentioned activities. Pt goals include return of above mentioned activities without pain/difficulty.  Past medical history was reviewed with Geeta. Significant findings include   Past Medical History:   Diagnosis Date    Abnormal uterine  bleeding     Decorative tattoo     Diverticulitis 2011       Pt denies diplopia, dysarthria, dysphasia, dizziness, drop attacks, bowel/bladder changes, saddle anesthesia.    ASSESSMENT  Geeta presents to physical therapy evaluation with primary c/o right low back and leg pain. The results of the objective tests and measures show poor posture, slight decreased trunk arom, tightness B hamstrings right more than left, tenderness/tightness right piriformis and post/lat hip, decreased flexibility with flexing lumbar spine.     Functional deficits include but are not limited to see pt summary.  Signs and symptoms are consistent with diagnosis of ischial bursitis; however, it appears pt also has right low back pain and right LE radicular symptoms.     Pt and PT discussed evaluation findings, pathology, POC and HEP.  Pt voiced understanding and performs HEP correctly without reported pain. Skilled Physical Therapy is medically necessary to address the above impairments and reach functional goals.     OBJECTIVE:   Observation/Posture: slight HFP, slight increased thoracic kyphosis, increased lumbar lordosis slight lateral trunk shift to left. Slight antalgic gait right LE   Neuro Screen: DTR B LE equal 2 to 2+ ankle/knee intact sharp/dull sensation B mid calf to feet/toes     Standing trunk  AROM:   Flexion: 80 to 85 deg-pain   Extension: 20 to 25 deg-no pain  Sidebending: R 25 to 30 deg; L 25 to 30 deg-tightness felt  Rotation: R 30 to 35 deg; L 30 to 35 deg-tightness felt    Accessory motion: decreased lumbar spine flexion and decreased thoracic extension   Palpation: tenderness/tightness right piriformis, post/lat right hip     Strength:  UE/Scapular LE    B LE knees/hips/ankles 5/5 throughout     Flexibility:   UE/Scapular LE    Hip Flexor: R min to mod tightness, L min to mod tightness  Hamstrings: R 60 to 65 deg; L 70 to 75 deg  Piriformis: R mod tightness; L WNL     Special tests:   Neg B SLR and slump tests  Pt  demonstrates lumbar extension biased-nearly all extension based activities are pain free with most flexion activities causing pain    Gait: pt ambulates on level ground with antalgia, decreased step length right LE, and decreased stance phase right LE .    Today’s Treatment and Response:   Pt education was provided on exam findings, treatment diagnosis, treatment plan, expectations, and prognosis. Pt was also provided recommendations for activity modifications, possible soreness after evaluation, modalities as needed [ice/heat], and postural corrections. Pt instructed how to make and use towel roll to support lumbar spine when sitting and driving   Patient was instructed in and issued a HEP for:     Access Code: [email or web address]  URL: [email or web address]  Date: 02/24/2024  Prepared by: Saray Clinton    Exercises  - Supine Posterior Pelvic Tilt  - 2 to 3 x daily - 7 x weekly - 1 sets - 10 reps  - Supine Hamstring Stretch  - 2 to 3 x daily - 7 x weekly - 1 sets - 10 reps  - Cat Cow  - 2 to 3 x daily - 7 x weekly - 3 sets - 10 reps  - Prone Press Up On Elbows  - 2 to 3 x daily - 7 x weekly - 1 sets - 10 reps  - Sit to Stand  - 2 to 3 x daily - 7 x weekly - 1 sets - 10 reps  - Standing Lumbar Extension  - 2 to 3 x daily - 7 x weekly - 1 sets - 10 reps    Charges: PT Eval Low Complexity, therex x1      Total Timed Treatment: 15 min     Total Treatment Time: eval plus 15 min     Based on clinical rationale and outcome measures, this evaluation involved Low Complexity decision making due to 1-2 personal factors/co morbidities  PLAN OF CARE:    Goals: (to be met in 12 visits)   Decrease tenderness/tightness right piriformis to allow pt to stand after prolonged sitting/driving with minimal to no pain/difficulty  Increase arom of trunk and B hamstrings 10 to 15 deg in all deficit planes to allow pt to bend forward toward ground,  toys off floor, and do laundry with minimal to no pain/difficulty  Decrease right  piriformis and right hip flexor tightness, increase thoracic spine mobility with extension and lumbar spine mobility with flexion to improve posture 15 to 20 percent more neutral to allow pt to sit/drive longer than 20 to 30 min and stand after prolonged sitting/driving  with minimal to no pain/difficulty  Decrease right LE radicular symptoms 40 to 50 percent in regards to intensity, frequency, and duration to allow pt to stand for greater than 45 min and ambulate without gait deviations   Pt to be able to return to modified workouts     Frequency / Duration: Patient will be seen for 2 x/week or a total of 8 visits over a 90 day period. Treatment will include: Gait training, Manual Therapy, Neuromuscular Re-education, Self-Care Home Management, Therapeutic Activities, Therapeutic Exercise, Home Exercise Program instruction, and Modalities to include: as needed    Education or treatment limitation: None  Rehab Potential:good    LEFS Score  LEFS Score: 63.75 % (2/22/2024  8:03 PM)      Patient/Family/Caregiver was advised of these findings, precautions, and treatment options and has agreed to actively participate in planning and for this course of care.    Thank you for your referral. Please co-sign or sign and return this letter via fax as soon as possible to [phone]. If you have any questions, please contact me at Dept: [phone]    Sincerely,  Electronically signed by therapist: Saray Clinton, PT    Physician's certification required: Yes  I certify the need for these services furnished under this plan of treatment and while under my care.    X___________________________________________________ Date____________________    Certification From: 2/23/2024  To:5/23/2024

## 2024-02-26 ENCOUNTER — APPOINTMENT (OUTPATIENT)
Dept: PHYSICAL THERAPY | Age: 44
End: 2024-02-26
Attending: PHYSICIAN ASSISTANT
Payer: COMMERCIAL

## 2024-02-27 ENCOUNTER — TELEPHONE (OUTPATIENT)
Dept: PHYSICAL THERAPY | Facility: HOSPITAL | Age: 44
End: 2024-02-27

## 2024-03-01 ENCOUNTER — OFFICE VISIT (OUTPATIENT)
Dept: PHYSICAL THERAPY | Age: 44
End: 2024-03-01
Attending: PHYSICIAN ASSISTANT
Payer: COMMERCIAL

## 2024-03-01 PROCEDURE — 97110 THERAPEUTIC EXERCISES: CPT

## 2024-03-01 NOTE — PROGRESS NOTES
Diagnosis:   Ischial bursitis of right side , right sided back / lumbar radiculitis       Referring Provider: Dalton  Date of Evaluation:    2/23 / 2024    Precautions:  None Next MD visit:   none scheduled  Date of Surgery: n/a   Insurance Primary/Secondary: [employer] / N/A     # Auth Visits: 12            Subjective: \" I continue to have pain on the right side of lower back especially with prolonged sitting . The pain  is radiating down to my leg and my foot with prolonged driving . I am performing my HEP daily and it helps \".    Pain: 4-5/10      Objective:       Assessment:  reviewed initial HEP with patent to ensure proper form . Patient performed HEP correctly without pain .      Goals: ( to be met in 12 visits )  Decrease tenderness / tightness right piriformis to allow pt to stand after prolonged sitting/ driving with minimal to no pain / difficulty.  Increase arom of trunk and B hamstrings 10 to 15 deg in all deficit planes to allow pt to bend forward toward ground ,  toys off floor , and do laundry with minimal to no pain / difficulty.  Decrease right piriformis and right hip flexor tightness , increase thoracic spine mobility with extension and lumbar spine mobility with flexion to improve posture 12 to 20 percent more neutral to allow pt to sit/drive longer then 20 to 30 min with minimal to no pain / difficulty.  Decrease right LE radicular symptoms 40 to 50 percent in regards to intensity , frequency , and duration to allow pt to stand for greater then 45 min and ambulate without gait deviation .  Pt to be able to return to modified workouts .    Plan: continue PT and advanced towards goals as able .  Date: 3/1/2024  TX#: 2/12 Date:                 TX#: 3/ Date:                 TX#: 4/ Date:                 TX#: 5/ Date:   Tx#: 6/   THERE EX X 40 MIN :       NU step  x 6 min , level 5       Supine :  R piriformis self stretch    5 reps with 10 sec hold   HS R stretch with belt OP x 5 with 10 sec  hold   PPT AROM x 10  TrA activation   2 x 10  TrA activation with adductors isometric adductors 2 x 10   TrA activation with abductors isometrics with fitness ring   2 x 10   Bridging x 10   Cat / cow AROM    10 x 5 sec hold   Prone :  Prone on elbows   2 x 10                 HEP:     Charges: there ex x 3        Total Timed Treatment: 40 min  Total Treatment Time: 45 min

## 2024-03-04 ENCOUNTER — TELEPHONE (OUTPATIENT)
Dept: PHYSICAL THERAPY | Facility: HOSPITAL | Age: 44
End: 2024-03-04

## 2024-03-05 ENCOUNTER — TELEPHONE (OUTPATIENT)
Dept: CASE MANAGEMENT | Age: 44
End: 2024-03-05

## 2024-03-05 NOTE — TELEPHONE ENCOUNTER
Contacted [employer] and scheduled a peer to peer on 3/6/2024 @10:15 am with Krissy Israel    Reference number [id number]

## 2024-03-05 NOTE — TELEPHONE ENCOUNTER
Dr. Quintanilla        Status: DENIED        Reference number [id number]     A copy of the denial letter is filed under the MEDIA tab, reference for complete details. You may reach out to [employer] at [phone] to discuss decision.     Please reach out to patient with plan of care.      Thank you  Corinna NEGRON

## 2024-03-08 ENCOUNTER — OFFICE VISIT (OUTPATIENT)
Dept: PHYSICAL THERAPY | Age: 44
End: 2024-03-08
Attending: PHYSICIAN ASSISTANT
Payer: COMMERCIAL

## 2024-03-08 PROCEDURE — 97110 THERAPEUTIC EXERCISES: CPT

## 2024-03-08 NOTE — PROGRESS NOTES
Diagnosis:   Ischial bursitis of right side , right sided back / lumbar radiculitis       Referring Provider: Dalton  Date of Evaluation:    2/23 / 2024    Precautions:  None Next MD visit:   none scheduled  Date of Surgery: n/a   Insurance Primary/Secondary: [employer] / N/A     # Auth Visits: 12            Subjective: \" I continue to feel tinging in my both feel at night but when I wake up in AM the tingling in gone \".    Pain: 4-5/10      Objective:  ( 3/08/24 ) AROM of thoracic ext : mod loss , AROM of lumbar extension : mod loss .    Assessment:  patient presented with mod tightness along thoracic and lumbar paraspinals and with mod loss of thoracic and lumbar AROM of extension .Initiated joint mobs to thoracic and lumbar spine to facilitate soft tissue  extensibility and to promote improved ROM of lumbar and thoracic extension in order to improve upright posturing with prolonged seated and standing activities .Patient presented with increase AROM of thoracic and lumbar spine from mod loss to min loss .Patient was able to perform all given ex's with radicular symptoms stayed abolished .      Goals: ( to be met in 12 visits )  Decrease tenderness / tightness right piriformis to allow pt to stand after prolonged sitting/ driving with minimal to no pain / difficulty.  Increase arom of trunk and B hamstrings 10 to 15 deg in all deficit planes to allow pt to bend forward toward ground ,  toys off floor , and do laundry with minimal to no pain / difficulty.  Decrease right piriformis and right hip flexor tightness , increase thoracic spine mobility with extension and lumbar spine mobility with flexion to improve posture 12 to 20 percent more neutral to allow pt to sit/drive longer then 20 to 30 min with minimal to no pain / difficulty.  Decrease right LE radicular symptoms 40 to 50 percent in regards to intensity , frequency , and duration to allow pt to stand for greater then 45 min and ambulate without gait  deviation .  Pt to be able to return to modified workouts .    Plan: continue PT and advanced towards goals as able .  Date: 3/1/2024  TX#: 2/12 Date:   3/8/24              TX#: 3/12 Date:                 TX#: 4/ Date:                 TX#: 5/ Date:   Tx#: 6/   THERE EX X 40 MIN : MANUAL MOBS x 15 min :  Prone :  Joint mobs to thoracic and  lumbar spine PA central gr 1-3       NU step  x 6 min , level 5 THERE EX X 30 MIN       Supine :  R piriformis self stretch    5 reps with 10 sec hold   HS R stretch with belt OP x 5 with 10 sec hold   PPT AROM x 10  TrA activation   2 x 10  TrA activation with adductors isometric adductors 2 x 10   TrA activation with abductors isometrics with fitness ring   2 x 10   Bridging x 10   Cat / cow AROM    10 x 5 sec hold   Prone :  Prone on elbows   2 x 10    Supine :  R piriformis stretch ( modified figure 4 )    5 reps each with 10 sec hold   Prone :  Prone on elbows   2 x 10   Prone press up   2 x 10   Prayer stretch :   X 10 reps with 10 sec hold   Prayer stretch to R/L :  X 10 reps each with 5  sec hold   Supine :  Pelvic clocks :  6 to 12 x 10   Bridging   2 x 10   Standing    GINA over fulcrum   2 x 10               HEP:     Charges: there ex x 3        Total Timed Treatment: 40 min  Total Treatment Time: 45 min

## 2024-03-15 ENCOUNTER — HOSPITAL ENCOUNTER (OUTPATIENT)
Dept: MRI IMAGING | Age: 44
Discharge: HOME | End: 2024-03-15
Attending: ANESTHESIOLOGY
Payer: COMMERCIAL

## 2024-03-15 ENCOUNTER — OFFICE VISIT (OUTPATIENT)
Dept: PHYSICAL THERAPY | Age: 44
End: 2024-03-15
Attending: PHYSICIAN ASSISTANT
Payer: COMMERCIAL

## 2024-03-15 DIAGNOSIS — M54.16 LUMBAR RADICULOPATHY: ICD-10-CM

## 2024-03-15 PROCEDURE — 97110 THERAPEUTIC EXERCISES: CPT

## 2024-03-15 PROCEDURE — 72148 MRI LUMBAR SPINE W/O DYE: CPT | Performed by: ANESTHESIOLOGY

## 2024-03-15 PROCEDURE — 97140 MANUAL THERAPY 1/> REGIONS: CPT

## 2024-03-15 NOTE — PROGRESS NOTES
Diagnosis:   Ischial bursitis of right side , right sided back / lumbar radiculitis       Referring Provider: Dalton  Date of Evaluation:    2/23 / 2024    Precautions:  None Next MD visit:   none scheduled  Date of Surgery: n/a   Insurance Primary/Secondary: [employer] / N/A     # Auth Visits: 12            Subjective: \" I continue to feel tinging in my both feel at nigh but it is less intense and when I wake up next day the tingling is gone \". ' I noticed that I do not have pain in my buttock with prolonged driving any more \".    Pain: 0 /10  ( discomfort in right buttock with prolonged sitting )       Objective:  ( 3/08/24 ) AROM of thoracic ext : mod loss , AROM of lumbar extension : mod loss .                     ( 3/15/24 ) AROM of thoracic ext : min loss , AROM of lumbar extension : min loss .    Assessment:  patient  continue to present  with mod tightness along thoracic and lumbar paraspinals but demo increase in  thoracic and lumbar AROM of extension  to min loss  at the beginning of this session ..Continued with joint mobs to thoracic and lumbar spine to facilitate soft tissue  extensibility and to promote improved ROM of lumbar and thoracic extension in order to facilitate and  improve upright posturing with prolonged seated and standing activities .Initiated STM / trigger point release to R piriformis with patient good tolerance .Patient presented with increase AROM of thoracic and lumbar spine from min  loss to no  loss  after joint mobs .Patient was able to perform all given ex's with radicular symptoms stayed abolished . Added self OP to lumbar extension AROM to further progress lumbar ext mobility with patient good tolerance . Patient demo improved AROM of lumbar spine to no loss after rep lumbar ext with OP.       Goals: ( to be met in 12 visits )  Decrease tenderness / tightness right piriformis to allow pt to stand after prolonged sitting/ driving with minimal to no pain / difficulty.  Increase arom  of trunk and B hamstrings 10 to 15 deg in all deficit planes to allow pt to bend forward toward ground ,  toys off floor , and do laundry with minimal to no pain / difficulty.  Decrease right piriformis and right hip flexor tightness , increase thoracic spine mobility with extension and lumbar spine mobility with flexion to improve posture 12 to 20 percent more neutral to allow pt to sit/drive longer then 20 to 30 min with minimal to no pain / difficulty.  Decrease right LE radicular symptoms 40 to 50 percent in regards to intensity , frequency , and duration to allow pt to stand for greater then 45 min and ambulate without gait deviation .  Pt to be able to return to modified workouts .    Plan: continue PT and advanced towards goals as able .  Date: 3/1/2024  TX#: 2/12 Date:   3/8/24              TX#: 3/12 Date: 3/15/24                TX#: 4/12 Date:                 TX#: 5/ Date:   Tx#: 6/   THERE EX X 40 MIN : MANUAL MOBS x 15 min :  Prone :  Joint mobs to thoracic and  lumbar spine PA central gr 1-3  MANUAL MOBS x 15 min :  Prone :  Joint mobs to the thoracic and lumbar paraspinals gr 1-3 central   Trigger point release to R piriformis        NU step  x 6 min , level 5 THERE EX X 30 MIN  THERE EX X 30 MIN      Supine :  R piriformis self stretch    5 reps with 10 sec hold   HS R stretch with belt OP x 5 with 10 sec hold   PPT AROM x 10  TrA activation   2 x 10  TrA activation with adductors isometric adductors 2 x 10   TrA activation with abductors isometrics with fitness ring   2 x 10   Bridging x 10   Cat / cow AROM    10 x 5 sec hold   Prone :  Prone on elbows   2 x 10    Supine :  R piriformis stretch ( modified figure 4 )    5 reps each with 10 sec hold   Prone :  Prone on elbows   2 x 10   Prone press up   2 x 10   Prayer stretch :   X 10 reps with 10 sec hold   Prayer stretch to R/L :  X 10 reps each with 5  sec hold   Supine :  Pelvic clocks :  6 to 12 x 10   Bridging   2 x 10   Standing    GINA  over fulcrum   2 x 10   Supine :  R piriformis self stretch ( modified figure 4 )   5 reps with 10 sec hold  Bridging with adductors with yellow ball with TrA activation   2 x 10   Bridging with adductors with fitness right and with TrA activation   2 x 10    Prone :  Prone on elbows   2 x 10   Prone press up 2 x 10   Prone press up with sag 2 x 10              HEP:     Charges: there ex x 2. Manual 1        Total Timed Treatment: 45 min  Total Treatment Time: 45 min

## 2024-03-22 ENCOUNTER — OFFICE VISIT (OUTPATIENT)
Dept: PHYSICAL THERAPY | Age: 44
End: 2024-03-22
Attending: PHYSICIAN ASSISTANT
Payer: COMMERCIAL

## 2024-03-22 PROCEDURE — 97140 MANUAL THERAPY 1/> REGIONS: CPT

## 2024-03-22 PROCEDURE — 97110 THERAPEUTIC EXERCISES: CPT

## 2024-03-22 NOTE — PROGRESS NOTES
Diagnosis:   Ischial bursitis of right side , right sided back / lumbar radiculitis       Referring Provider: Dalton  Date of Evaluation:    2/23 /2024    Precautions:  None Next MD visit:   none scheduled  Date of Surgery: n/a   Insurance Primary/Secondary: [employer] / N/A     # Auth Visits: 12            Subjective: \" I do not have any feet tingling at night and I am able to seat longer period of times without LB pain / discomfort . I am performing my HEP 3 times a day \".    Pain: 0 /10  ( discomfort in right buttock with prolonged sitting )       Objective:  ( 3/08/24 ) AROM of thoracic ext : mod loss , AROM of lumbar extension : mod loss .                     ( 3/15/24 ) AROM of thoracic ext : min loss , AROM of lumbar extension : min loss .                     (3 /22/24 ) AROM of thoracic ext : min loss , AROM of lumbar extension : min loss    Assessment:  patient  presented   with less  tightness along thoracic and lumbar paraspinals and demo increase in  thoracic and lumbar AROM of extension  to min loss  at the beginning of this session ..Continued with joint mobs to thoracic and lumbar spine to facilitate soft tissue  extensibility and to promote improved ROM of lumbar and thoracic extension in order to facilitate and  improve upright posturing with prolonged seated and standing activities .Initiated doorway stretches and pectoral stretches on 1/2 FR to further facilitate and promote thoracic mobility with prolonged seated and standing activities and initiated scapula strength ex's in prone position to progress trunks stability with upright posturing .Patient required manual and verbal cueing for correct scapula mm recruitment to ensue proper form but able to perform all given ex's without report of increase in lower back pain \".      Goals: ( to be met in 12 visits )  Decrease tenderness / tightness right piriformis to allow pt to stand after prolonged sitting/ driving with minimal to no pain /  difficulty.  Increase arom of trunk and B hamstrings 10 to 15 deg in all deficit planes to allow pt to bend forward toward ground ,  toys off floor , and do laundry with minimal to no pain / difficulty.  Decrease right piriformis and right hip flexor tightness , increase thoracic spine mobility with extension and lumbar spine mobility with flexion to improve posture 12 to 20 percent more neutral to allow pt to sit/drive longer then 20 to 30 min with minimal to no pain / difficulty.  Decrease right LE radicular symptoms 40 to 50 percent in regards to intensity , frequency , and duration to allow pt to stand for greater then 45 min and ambulate without gait deviation .  Pt to be able to return to modified workouts .    Plan: continue PT and advanced towards goals as able .  Date: 3/1/2024  TX#: 2/12 Date:   3/8/24              TX#: 3/12 Date: 3/15/24                TX#: 4/12 Date: 3/22/24                TX#: 5/12 Date:   Tx#: 6/   THERE EX X 40 MIN : MANUAL MOBS x 15 min :  Prone :  Joint mobs to thoracic and  lumbar spine PA central gr 1-3  MANUAL MOBS x 15 min :  Prone :  Joint mobs to the thoracic and lumbar paraspinals gr 1-3 central   Trigger point release to R piriformis    MANUAL MOBS X 15 MIN :  Prone :  Joints mobs to the thoracic and lumbar paraspinals gr 1-3 central     NU step  x 6 min , level 5 THERE EX X 30 MIN  THERE EX X 30 MIN  THERE EX X 30 MIN :    Supine :  R piriformis self stretch    5 reps with 10 sec hold   HS R stretch with belt OP x 5 with 10 sec hold   PPT AROM x 10  TrA activation   2 x 10  TrA activation with adductors isometric adductors 2 x 10   TrA activation with abductors isometrics with fitness ring   2 x 10   Bridging x 10   Cat / cow AROM    10 x 5 sec hold   Prone :  Prone on elbows   2 x 10    Supine :  R piriformis stretch ( modified figure 4 )    5 reps each with 10 sec hold   Prone :  Prone on elbows   2 x 10   Prone press up   2 x 10   Prayer stretch :   X 10 reps with  10 sec hold   Prayer stretch to R/L :  X 10 reps each with 5  sec hold   Supine :  Pelvic clocks :  6 to 12 x 10   Bridging   2 x 10   Standing    GINA over fulcrum   2 x 10   Supine :  R piriformis self stretch ( modified figure 4 )   5 reps with 10 sec hold  Bridging with adductors with yellow ball with TrA activation   2 x 10   Bridging with adductors with fitness right and with TrA activation   2 x 10    Prone :  Prone on elbows   2 x 10   Prone press up 2 x 10   Prone press up with sag 2 x 10   Prone :  Prone on elbow   2 x 10   Prone press ups   2 x 10   Prone press up with self OP   2 x 10   B scapula retraction   X 10   B scapula retraction with T's   X 10  B scapula retraction with Y's   X 10   Supine :  Supine on 1/2 FR :  Pectoral stretches :  Serratus punches       X 10   Horizontal abd      X 10   Snow angels   X 10   Standing :  Doorway stretch for pectoral mm      X 5 reps x 10 sec hold            HEP:   ( 3 /22 / 24 ) : doorway stretch , scapula retraction in seated position , prone : scapula retraction with she ext and with T ( handout provided )    Charges: there ex x 2. Manual 1        Total Timed Treatment: 45 min  Total Treatment Time: 45 min

## 2024-03-25 ENCOUNTER — OFFICE VISIT (OUTPATIENT)
Dept: PHYSICAL THERAPY | Age: 44
End: 2024-03-25
Attending: PHYSICIAN ASSISTANT
Payer: COMMERCIAL

## 2024-03-25 PROCEDURE — 97140 MANUAL THERAPY 1/> REGIONS: CPT

## 2024-03-25 PROCEDURE — 97110 THERAPEUTIC EXERCISES: CPT

## 2024-03-25 NOTE — PROGRESS NOTES
Diagnosis:   Ischial bursitis of right side , right sided back / lumbar radiculitis       Referring Provider: Dalton  Date of Evaluation:    2/23 /2024    Precautions:  None Next MD visit:   none scheduled  Date of Surgery: n/a   Insurance Primary/Secondary: [employer] / N/A     # Auth Visits: 12            Subjective: pt reports she has improved 70 percent since starting therapy.    Pain: 0/10 at best when walking around. Today coming into therapy 2/10. Worst 5/10 when sitting in a little chair folding laundry.      Objective:  ( 3/08/24 ) AROM of thoracic ext : mod loss , AROM of lumbar extension : mod loss .                     ( 3/15/24 ) AROM of thoracic ext : min loss , AROM of lumbar extension : min loss .                     (3 /22/24 ) AROM of thoracic ext : min loss , AROM of lumbar extension : min loss                      (3/25/24) pt has significant thoracic kyphosis     Assessment: pt did very well with session. She mainly now has issues with sitting; therefore, new ex added sitting on swiss ball to improve core/trunk/lumbar stab/engagement and posture. Also added PA mobes to T-spine and B post ribs to decrease thoracic kyphosis.       Goals: ( to be met in 12 visits )  Decrease tenderness / tightness right piriformis to allow pt to stand after prolonged sitting/ driving with minimal to no pain / difficulty.  Increase arom of trunk and B hamstrings 10 to 15 deg in all deficit planes to allow pt to bend forward toward ground ,  toys off floor , and do laundry with minimal to no pain / difficulty.  Decrease right piriformis and right hip flexor tightness , increase thoracic spine mobility with extension and lumbar spine mobility with flexion to improve posture 12 to 20 percent more neutral to allow pt to sit/drive longer then 20 to 30 min with minimal to no pain / difficulty.  Decrease right LE radicular symptoms 40 to 50 percent in regards to intensity , frequency , and duration to allow pt to stand  for greater then 45 min and ambulate without gait deviation .  Pt to be able to return to modified workouts .    Plan: continue PT and advanced towards goals as able. Monitor effects of new manual therapy and ex added. If pt continues to do well she may not need all 12 sessions and/or she could decrease to 1x a week and/or be discharged early if goals are met early.   Date: 3/1/2024  TX#: 2/12 Date:   3/8/24              TX#: 3/12 Date: 3/15/24                TX#: 4/12 Date: 3/22/24                TX#: 5/12 Date: 3/25/24  Tx#: 6/12   THERE EX X 40 MIN : MANUAL MOBS x 15 min :  Prone :  Joint mobs to thoracic and  lumbar spine PA central gr 1-3  MANUAL MOBS x 15 min :  Prone :  Joint mobs to the thoracic and lumbar paraspinals gr 1-3 central   Trigger point release to R piriformis    MANUAL MOBS X 15 MIN :  Prone :  Joints mobs to the thoracic and lumbar paraspinals gr 1-3 central  Man there 15 min  Prone PA mobes to L-spine, either side of L-spine, T-spine and B post ribs  Piriformis release right    NU step  x 6 min , level 5 THERE EX X 30 MIN  THERE EX X 30 MIN  THERE EX X 30 MIN : There ex 30 min   Supine :  R piriformis self stretch    5 reps with 10 sec hold   HS R stretch with belt OP x 5 with 10 sec hold   PPT AROM x 10  TrA activation   2 x 10  TrA activation with adductors isometric adductors 2 x 10   TrA activation with abductors isometrics with fitness ring   2 x 10   Bridging x 10   Cat / cow AROM    10 x 5 sec hold   Prone :  Prone on elbows   2 x 10    Supine :  R piriformis stretch ( modified figure 4 )    5 reps each with 10 sec hold   Prone :  Prone on elbows   2 x 10   Prone press up   2 x 10   Prayer stretch :   X 10 reps with 10 sec hold   Prayer stretch to R/L :  X 10 reps each with 5  sec hold   Supine :  Pelvic clocks :  6 to 12 x 10   Bridging   2 x 10   Standing    GINA over fulcrum   2 x 10   Supine :  R piriformis self stretch ( modified figure 4 )   5 reps with 10 sec hold  Bridging with  adductors with yellow ball with TrA activation   2 x 10   Bridging with adductors with fitness right and with TrA activation   2 x 10    Prone :  Prone on elbows   2 x 10   Prone press up 2 x 10   Prone press up with sag 2 x 10   Prone :  Prone on elbow   2 x 10   Prone press ups   2 x 10   Prone press up with self OP   2 x 10   B scapula retraction   X 10   B scapula retraction with T's   X 10  B scapula retraction with Y's   X 10   Supine :  Supine on 1/2 FR :  Pectoral stretches :  Serratus punches       X 10   Horizontal abd      X 10   Snow angels   X 10   Standing :  Doorway stretch for pectoral mm      X 5 reps x 10 sec hold  Prone  Prone on elbows 10x  Prone press ups 10x  Prone B scapulae retraction 10x 10 sec  On all 4's prayer stretch 5x 10 sec  Supine  Supine 1/2 foam roll B shoulder flex/horizontal abd/add and ER 5x 10 sec  Supine right piriformis stretch IR and ER 5x 10 sec  Sitting on ball circles cw/ccw, fw/bw/sw 10x ea  Sitting on ball 2 pounds each hand shoulder flex and abd 10x, bench press and  press 10x     Sitting on ball GSC rowing/ext 20x ea             HEP:   ( 3 /22 / 24 ) : doorway stretch , scapula retraction in seated position , prone : scapula retraction with she ext and with T ( handout provided )  3/25/24 sports cord rowing/ext, sitting on edge of chair pelvic circles cw/ccw, fw/bw. Sitting 2 pound weights as instructed above    Charges: there ex x 2. Manual 1        Total Timed Treatment: 45 min  Total Treatment Time: 45 min

## 2024-04-01 ENCOUNTER — APPOINTMENT (OUTPATIENT)
Dept: PHYSICAL THERAPY | Age: 44
End: 2024-04-01
Attending: PHYSICIAN ASSISTANT
Payer: COMMERCIAL

## 2024-04-08 PROBLEM — Z12.11 SPECIAL SCREENING FOR MALIGNANT NEOPLASM OF COLON: Status: ACTIVE | Noted: 2024-04-08

## 2024-04-10 ENCOUNTER — APPOINTMENT (OUTPATIENT)
Dept: PHYSICAL THERAPY | Age: 44
End: 2024-04-10
Attending: PHYSICIAN ASSISTANT
Payer: COMMERCIAL

## 2024-04-12 ENCOUNTER — APPOINTMENT (OUTPATIENT)
Dept: PHYSICAL THERAPY | Age: 44
End: 2024-04-12
Attending: PHYSICIAN ASSISTANT
Payer: COMMERCIAL

## 2024-04-12 ENCOUNTER — TELEPHONE (OUTPATIENT)
Dept: PHYSICAL THERAPY | Facility: HOSPITAL | Age: 44
End: 2024-04-12

## 2024-04-15 ENCOUNTER — APPOINTMENT (OUTPATIENT)
Dept: PHYSICAL THERAPY | Age: 44
End: 2024-04-15
Attending: PHYSICIAN ASSISTANT
Payer: COMMERCIAL

## 2024-04-17 ENCOUNTER — APPOINTMENT (OUTPATIENT)
Dept: PHYSICAL THERAPY | Age: 44
End: 2024-04-17
Attending: PHYSICIAN ASSISTANT
Payer: COMMERCIAL

## 2024-04-22 ENCOUNTER — OFFICE VISIT (OUTPATIENT)
Dept: PHYSICAL THERAPY | Age: 44
End: 2024-04-22
Attending: PHYSICIAN ASSISTANT
Payer: COMMERCIAL

## 2024-04-22 PROCEDURE — 97140 MANUAL THERAPY 1/> REGIONS: CPT

## 2024-04-22 PROCEDURE — 97110 THERAPEUTIC EXERCISES: CPT

## 2024-04-22 NOTE — PROGRESS NOTES
Diagnosis:   Ischial bursitis of right side , right sided back / lumbar radiculitis       Referring Provider: Dalton  Date of Evaluation:    2/23 /2024    Precautions:  None Next MD visit:   none scheduled  Date of Surgery: n/a   Insurance Primary/Secondary: [employer] / N/A     # Auth Visits: 12            Subjective: \" my mid back and my lower back feels much better and I feel less tightness with movements . Piriformis stretches help me to release my lower back pain and tightness \".    Pain: 0/10 ( some thoracic and lumbar stiffness with prolonged sitting )      Objective:  ( 3/08/24 ) AROM of thoracic ext : mod loss , AROM of lumbar extension : mod loss .                     ( 3/15/24 ) AROM of thoracic ext : min loss , AROM of lumbar extension : min loss .                     (3 /22/24 ) AROM of thoracic ext : min loss , AROM of lumbar extension : min loss                      (3/25/24) pt has significant thoracic kyphosis     Assessment: continued with joints mobs to thoracic and lumbar spine to further progress spinal mobility with movements . Added trunk stability ex's for mid and lower back for improved stability with sustained sitting . Provided verbal and manual cues for correct scapula recruitment with scapula strength ex's performed in prone position to ensure proper form .Patient was able to perform all given ex's without report of mid / lower back pain and reported decrease in thoracic / lumbar tightness .      Goals: ( to be met in 12 visits )  Decrease tenderness / tightness right piriformis to allow pt to stand after prolonged sitting/ driving with minimal to no pain / difficulty.  Increase arom of trunk and B hamstrings 10 to 15 deg in all deficit planes to allow pt to bend forward toward ground ,  toys off floor , and do laundry with minimal to no pain / difficulty.  Decrease right piriformis and right hip flexor tightness , increase thoracic spine mobility with extension and lumbar spine  mobility with flexion to improve posture 12 to 20 percent more neutral to allow pt to sit/drive longer then 20 to 30 min with minimal to no pain / difficulty.  Decrease right LE radicular symptoms 40 to 50 percent in regards to intensity , frequency , and duration to allow pt to stand for greater then 45 min and ambulate without gait deviation .  Pt to be able to return to modified workouts .    Plan: continue PT and advanced towards goals as able.Will decrease frequency of PT to 1 x week as per PT/PTA collaboration and with patient agreement .  Date:   3/8/24              TX#: 3/12 Date: 3/15/24                TX#: 4/12 Date: 3/22/24                TX#: 5/12 Date: 3/25/24  Tx#: 6/12 Date : 4/22/24  TX # 7/12   MANUAL MOBS x 15 min :  Prone :  Joint mobs to thoracic and  lumbar spine PA central gr 1-3  MANUAL MOBS x 15 min :  Prone :  Joint mobs to the thoracic and lumbar paraspinals gr 1-3 central   Trigger point release to R piriformis    MANUAL MOBS X 15 MIN :  Prone :  Joints mobs to the thoracic and lumbar paraspinals gr 1-3 central  Man there 15 min  Prone PA mobes to L-spine, either side of L-spine, T-spine and B post ribs  Piriformis release right  MANUAL THERAPY  x 15 min   Prone :  PA mobs to thoracic and lumbar joints gr 1-3   R piriformis release    THERE EX X 30 MIN  THERE EX X 30 MIN  THERE EX X 30 MIN : There ex 30 min THERE EX X 30 MIN    Supine :  R piriformis stretch ( modified figure 4 )    5 reps each with 10 sec hold   Prone :  Prone on elbows   2 x 10   Prone press up   2 x 10   Prayer stretch :   X 10 reps with 10 sec hold   Prayer stretch to R/L :  X 10 reps each with 5  sec hold   Supine :  Pelvic clocks :  6 to 12 x 10   Bridging   2 x 10   Standing    GINA over fulcrum   2 x 10   Supine :  R piriformis self stretch ( modified figure 4 )   5 reps with 10 sec hold  Bridging with adductors with yellow ball with TrA activation   2 x 10   Bridging with adductors with fitness right and with TrA  activation   2 x 10    Prone :  Prone on elbows   2 x 10   Prone press up 2 x 10   Prone press up with sag 2 x 10   Prone :  Prone on elbow   2 x 10   Prone press ups   2 x 10   Prone press up with self OP   2 x 10   B scapula retraction   X 10   B scapula retraction with T's   X 10  B scapula retraction with Y's   X 10   Supine :  Supine on 1/2 FR :  Pectoral stretches :  Serratus punches       X 10   Horizontal abd      X 10   Snow angels   X 10   Standing :  Doorway stretch for pectoral mm      X 5 reps x 10 sec hold  Prone  Prone on elbows 10x  Prone press ups 10x  Prone B scapulae retraction 10x 10 sec  On all 4's prayer stretch 5x 10 sec  Supine  Supine 1/2 foam roll B shoulder flex/horizontal abd/add and ER 5x 10 sec  Supine right piriformis stretch IR and ER 5x 10 sec  Sitting on ball circles cw/ccw, fw/bw/sw 10x ea  Sitting on ball 2 pounds each hand shoulder flex and abd 10x, bench press and  press 10x     Sitting on ball GSC rowing/ext 20x ea    Prone :  Pectoral stretch with hands behind neck   2 x 10   Prone on elbows   2 x 10   Prone press up   2 x 10   Scapula retraction   2 x 10   Scapula retraction with sh ext   2 x 10   Scapula retraction with horizontal abd   2 x 10   Alt hip ext with arm flex   2 x 10   \"  \"  2 x 10   Supine :  R piriformis self stretch   10 reps x 10 sec hold   Bridging  2 x 10   Bridging with adductors activation  2 x 10   Bridging with abductors activation   2 x 10             HEP:   ( 3 /22 / 24 ) : doorway stretch , scapula retraction in seated position , prone : scapula retraction with she ext and with T ( handout provided )  3/25/24 sports cord rowing/ext, sitting on edge of chair pelvic circles cw/ccw, fw/bw. Sitting 2 pound weights as instructed above    Charges: there ex x 2. Manual 1        Total Timed Treatment: 45 min  Total Treatment Time: 45 min

## 2024-04-24 ENCOUNTER — APPOINTMENT (OUTPATIENT)
Dept: PHYSICAL THERAPY | Age: 44
End: 2024-04-24
Attending: PHYSICIAN ASSISTANT
Payer: COMMERCIAL

## 2024-04-29 ENCOUNTER — OFFICE VISIT (OUTPATIENT)
Dept: PHYSICAL THERAPY | Age: 44
End: 2024-04-29
Attending: PHYSICIAN ASSISTANT
Payer: COMMERCIAL

## 2024-04-29 PROCEDURE — 97110 THERAPEUTIC EXERCISES: CPT

## 2024-04-29 PROCEDURE — 97140 MANUAL THERAPY 1/> REGIONS: CPT

## 2024-04-29 NOTE — PROGRESS NOTES
Diagnosis:   Ischial bursitis of right side , right sided back / lumbar radiculitis       Referring Provider: Dalton  Date of Evaluation:    2/23 /2024    Precautions:  None Next MD visit:   none scheduled  Date of Surgery: n/a   Insurance Primary/Secondary: [employer] / N/A     # Auth Visits: 12            Subjective: \" I did a lot of sitting and driving this past week and over the weekend and my lower back got stiff but I did my HEP and it felt better \".    Pain: 0/10 ( some thoracic and lumbar stiffness with prolonged sitting )      Objective:  ( 3/08/24 ) AROM of thoracic ext : mod loss , AROM of lumbar extension : mod loss .                     ( 3/15/24 ) AROM of thoracic ext : min loss , AROM of lumbar extension : min loss .                     (3 /22/24 ) AROM of thoracic ext : min loss , AROM of lumbar extension : min loss                      (3/25/24) pt has significant thoracic kyphosis     Assessment: continued with joints mobs to thoracic and lumbar spine to further progress spinal mobility with movements . Increased reps on  trunk stability ex's for mid and lower back for improved stability with sustained sitting . Initiated TrA / multifidus strength ex's in prone position to further challenge trunk stability with sustained seated and standing activities with patient good tolerance .      Goals: ( to be met in 12 visits )  Decrease tenderness / tightness right piriformis to allow pt to stand after prolonged sitting/ driving with minimal to no pain / difficulty.  Increase arom of trunk and B hamstrings 10 to 15 deg in all deficit planes to allow pt to bend forward toward ground ,  toys off floor , and do laundry with minimal to no pain / difficulty.  Decrease right piriformis and right hip flexor tightness , increase thoracic spine mobility with extension and lumbar spine mobility with flexion to improve posture 12 to 20 percent more neutral to allow pt to sit/drive longer then 20 to 30 min with  minimal to no pain / difficulty.  Decrease right LE radicular symptoms 40 to 50 percent in regards to intensity , frequency , and duration to allow pt to stand for greater then 45 min and ambulate without gait deviation .  Pt to be able to return to modified workouts .    Plan: continue PT and advanced towards goals as able.Will decrease frequency of PT to 1 x week as per PT/PTA collaboration and with patient agreement .  Date: 3/15/24                TX#: 4/12 Date: 3/22/24                TX#: 5/12 Date: 3/25/24  Tx#: 6/12 Date : 4/22/24  TX # 7/12 Date : 4/29/24  TX # 8/12   MANUAL MOBS x 15 min :  Prone :  Joint mobs to the thoracic and lumbar paraspinals gr 1-3 central   Trigger point release to R piriformis    MANUAL MOBS X 15 MIN :  Prone :  Joints mobs to the thoracic and lumbar paraspinals gr 1-3 central  Man there 15 min  Prone PA mobes to L-spine, either side of L-spine, T-spine and B post ribs  Piriformis release right  MANUAL THERAPY  x 15 min   Prone :  PA mobs to thoracic and lumbar joints gr 1-3   R piriformis release  MANUAL THERAPY X 15 MIN   Prone :  PA mobs to thoracic and lumbar spine gr 1-3 central   R piriformis release    THERE EX X 30 MIN  THERE EX X 30 MIN : There ex 30 min THERE EX X 30 MIN  THERE EX X 30 MIN    Supine :  R piriformis self stretch ( modified figure 4 )   5 reps with 10 sec hold  Bridging with adductors with yellow ball with TrA activation   2 x 10   Bridging with adductors with fitness right and with TrA activation   2 x 10    Prone :  Prone on elbows   2 x 10   Prone press up 2 x 10   Prone press up with sag 2 x 10   Prone :  Prone on elbow   2 x 10   Prone press ups   2 x 10   Prone press up with self OP   2 x 10   B scapula retraction   X 10   B scapula retraction with T's   X 10  B scapula retraction with Y's   X 10   Supine :  Supine on 1/2 FR :  Pectoral stretches :  Serratus punches       X 10   Horizontal abd      X 10   Snow angels   X 10   Standing :  Doorway  stretch for pectoral mm      X 5 reps x 10 sec hold  Prone  Prone on elbows 10x  Prone press ups 10x  Prone B scapulae retraction 10x 10 sec  On all 4's prayer stretch 5x 10 sec  Supine  Supine 1/2 foam roll B shoulder flex/horizontal abd/add and ER 5x 10 sec  Supine right piriformis stretch IR and ER 5x 10 sec  Sitting on ball circles cw/ccw, fw/bw/sw 10x ea  Sitting on ball 2 pounds each hand shoulder flex and abd 10x, bench press and  press 10x     Sitting on ball GSC rowing/ext 20x ea    Prone :  Pectoral stretch with hands behind neck   2 x 10   Prone on elbows   2 x 10   Prone press up   2 x 10   Scapula retraction   2 x 10   Scapula retraction with sh ext   2 x 10   Scapula retraction with horizontal abd   2 x 10   Alt hip ext with arm flex   2 x 10   \"  \"  2 x 10   Supine :  R piriformis self stretch   10 reps x 10 sec hold   Bridging  2 x 10   Bridging with adductors activation  2 x 10   Bridging with abductors activation   2 x 10    Prone :  Press on elbows   2 x 20   Prone press ups   2 x  10  Prone scapula retraction   2 x 10   Prone scapula retraction with sh ext   2 x 10   Scapula retraction with arm horizontal abd   2 x 10   TrA / multifidus activation with :  Alt hip ext   2 x 10   Alt shoulder flex   2 x 10  Alt hip ext with shoulder flex   2 x 10   \"  \"   2 x 10   Supine :  Active pectoral stretch on 1/2 FR :  \" Swims \"  X 30  Serratus punches     X 20   \" Snow angels \"  X 20            HEP:   ( 3 /22 / 24 ) : doorway stretch , scapula retraction in seated position , prone : scapula retraction with she ext and with T ( handout provided )  3/25/24 sports cord rowing/ext, sitting on edge of chair pelvic circles cw/ccw, fw/bw. Sitting 2 pound weights as instructed above    Charges: there ex x 2. Manual 1        Total Timed Treatment: 45 min  Total Treatment Time: 45 min

## 2024-05-01 ENCOUNTER — APPOINTMENT (OUTPATIENT)
Dept: PHYSICAL THERAPY | Age: 44
End: 2024-05-01
Attending: PHYSICIAN ASSISTANT
Payer: COMMERCIAL

## 2024-05-06 ENCOUNTER — OFFICE VISIT (OUTPATIENT)
Dept: PHYSICAL THERAPY | Age: 44
End: 2024-05-06
Attending: PHYSICIAN ASSISTANT
Payer: COMMERCIAL

## 2024-05-06 PROCEDURE — 97110 THERAPEUTIC EXERCISES: CPT

## 2024-05-06 PROCEDURE — 97140 MANUAL THERAPY 1/> REGIONS: CPT

## 2024-05-07 NOTE — PROGRESS NOTES
Diagnosis:   Ischial bursitis of right side , right sided back / lumbar radiculitis       Referring Provider: Dalton  Date of Evaluation:    2/23 /2024    Precautions:  None Next MD visit:   none scheduled  Date of Surgery: n/a   Insurance Primary/Secondary: [employer] / N/A     # Auth Visits: 12           Discharge Summary  Pt has attended 9 visits in Physical Therapy    .    Subjective: pt reports she is overall 85 to 90 percent better since starting therapy. She is russell to bend forward toward the ground overall 85 percent easier, she can  now stand for 60 min and sit/drive for 30 min without pain/difficulty. She has not had any radicular symptoms in her leg-frequency, duration, intensity has decreased overall 95 to 100 percent. She is back to doing her workouts 4 days a week-90 percent improved since starting therapy. Doing laundry has not improved much. She is able to stand after prolonged sitting/driving overall 50 percent easier.     Pain: 0/10 at best with worst 4/10 most of the time pain is 0 to 2/10      Objective:  see below for ex and manual therapy  5/6/24  -tenderness/tightness has decreased 75 percent  -arom of trunk has increased 10 to 15 degrees and is now WNL-flexion 90 deg, SBR/L 30 to 35 deg, RR/RL 40 to 45 deg, ext 25 to 30 deg  -arom right hamstrings 75 to 80 deg, left 85 to 90 deg  -flexibility of piriformis, hip flexors, thoracic  and lumbar spine has improved to allow posture to improve overall 15 to 20 percent more neutral  -pt is able to ambulate without gait deviations    Assessment:  Pt has met nearly all goals stated on evaluation and is independent with proper posture/body mechanics and HEP.     Goals: ( to be met in 12 visits )  Decrease tenderness / tightness right piriformis to allow pt to stand after prolonged sitting/ driving with minimal to no pain / difficulty.-goals met  Increase arom of trunk and B hamstrings 10 to 15 deg in all deficit planes to allow pt to bend forward toward  ground ,  toys off floor , and do laundry with minimal to no pain / difficulty-goals almost met  Decrease right piriformis and right hip flexor tightness , increase thoracic spine mobility with extension and lumbar spine mobility with flexion to improve posture 15 to 20 percent more neutral to allow pt to sit/drive longer then 20 to 30 min with minimal to no pain / difficulty.-goals met  Decrease right LE radicular symptoms 40 to 50 percent in regards to intensity , frequency , and duration to allow pt to stand for greater then 45 min and ambulate without gait deviation .  Pt to be able to return to modified workouts-goals met       Post LEFS Score  Post LEFS Score: 81.25 % (5/6/2024  7:12 PM)    17.5 % improvement  Post Oswestry Disability Index Score  Post Score: 12 % (5/6/2024  7:09 PM)    -12 % improvement    Plan: pt to be discharged with HEP which was reviewed. Pt to contact clinic/office with any questions/concerns      Patient/Family/Caregiver was advised of these findings, precautions, and treatment options and has agreed to actively participate in planning and for this course of care.    Thank you for your referral. If you have any questions, please contact me at Dept: [phone].    Sincerely,  Electronically signed by therapist: Saray Clinton, PT     Physician's certification required:  No  Please co-sign or sign and return this letter via fax as soon as possible to [phone].   I certify the need for these services furnished under this plan of treatment and while under my care.    X___________________________________________________ Date____________________    Certification From: 5/6/2024  To:8/4/2024    Date: 3/15/24                TX#: 4/12 Date: 3/22/24                TX#: 5/12 Date: 3/25/24  Tx#: 6/12 Date : 4/22/24  TX # 7/12 Date : 4/29/24  TX # 8/12 5/6/24  TX 9/12   MANUAL MOBS x 15 min :  Prone :  Joint mobs to the thoracic and lumbar paraspinals gr 1-3 central   Trigger point  release to R piriformis    MANUAL MOBS X 15 MIN :  Prone :  Joints mobs to the thoracic and lumbar paraspinals gr 1-3 central  Man there 15 min  Prone PA mobes to L-spine, either side of L-spine, T-spine and B post ribs  Piriformis release right  MANUAL THERAPY  x 15 min   Prone :  PA mobs to thoracic and lumbar joints gr 1-3   R piriformis release  MANUAL THERAPY X 15 MIN   Prone :  PA mobs to thoracic and lumbar spine gr 1-3 central   R piriformis release  Manual therapy 10 min  Re-assessment        THERE EX X 30 MIN  THERE EX X 30 MIN : There ex 30 min THERE EX X 30 MIN  THERE EX X 30 MIN  There ex 30 min   Supine :  R piriformis self stretch ( modified figure 4 )   5 reps with 10 sec hold  Bridging with adductors with yellow ball with TrA activation   2 x 10   Bridging with adductors with fitness right and with TrA activation   2 x 10    Prone :  Prone on elbows   2 x 10   Prone press up 2 x 10   Prone press up with sag 2 x 10   Prone :  Prone on elbow   2 x 10   Prone press ups   2 x 10   Prone press up with self OP   2 x 10   B scapula retraction   X 10   B scapula retraction with T's   X 10  B scapula retraction with Y's   X 10   Supine :  Supine on 1/2 FR :  Pectoral stretches :  Serratus punches       X 10   Horizontal abd      X 10   Snow angels   X 10   Standing :  Doorway stretch for pectoral mm      X 5 reps x 10 sec hold  Prone  Prone on elbows 10x  Prone press ups 10x  Prone B scapulae retraction 10x 10 sec  On all 4's prayer stretch 5x 10 sec  Supine  Supine 1/2 foam roll B shoulder flex/horizontal abd/add and ER 5x 10 sec  Supine right piriformis stretch IR and ER 5x 10 sec  Sitting on ball circles cw/ccw, fw/bw/sw 10x ea  Sitting on ball 2 pounds each hand shoulder flex and abd 10x, bench press and  press 10x     Sitting on ball GSC rowing/ext 20x ea    Prone :  Pectoral stretch with hands behind neck   2 x 10   Prone on elbows   2 x 10   Prone press up   2 x 10   Scapula retraction   2 x  10   Scapula retraction with sh ext   2 x 10   Scapula retraction with horizontal abd   2 x 10   Alt hip ext with arm flex   2 x 10   \"  \"  2 x 10   Supine :  R piriformis self stretch   10 reps x 10 sec hold   Bridging  2 x 10   Bridging with adductors activation  2 x 10   Bridging with abductors activation   2 x 10    Prone :  Press on elbows   2 x 20   Prone press ups   2 x  10  Prone scapula retraction   2 x 10   Prone scapula retraction with sh ext   2 x 10   Scapula retraction with arm horizontal abd   2 x 10   TrA / multifidus activation with :  Alt hip ext   2 x 10   Alt shoulder flex   2 x 10  Alt hip ext with shoulder flex   2 x 10   \"  \"   2 x 10   Supine :  Active pectoral stretch on 1/2 FR :  \" Swims \"  X 30  Serratus punches     X 20   \" Snow angels \"  X 20   Tm 1/2 mph 2 min ea sw and fw 2 min  Supine B hamstring stretch with hands 5x 10 sec ea  Hook lying trunk rotation 5 to 10x 5 to 10 sec  Piriformis stretch right 5 to 10x 10 sec both hip ER and  hip IR   Prone B hip ext 10x 5 sec hold  Instructed pt in self piriformis release and prox hamstring release with tennis ball against the wall   Standing lumbar ext 5x 3 to 5 sec  Reviewed HEP            HEP:   ( 3 /22 / 24 ) : doorway stretch , scapula retraction in seated position , prone : scapula retraction with she ext and with T ( handout provided )  3/25/24 sports cord rowing/ext, sitting on edge of chair pelvic circles cw/ccw, fw/bw. Sitting 2 pound weights as instructed above    Charges: there ex x 2. Manual 1        Total Timed Treatment: 40 min  Total Treatment Time: 40 min

## 2024-05-09 ENCOUNTER — APPOINTMENT (OUTPATIENT)
Dept: PHYSICAL THERAPY | Age: 44
End: 2024-05-09
Attending: PHYSICIAN ASSISTANT
Payer: COMMERCIAL

## 2024-05-24 ENCOUNTER — E-VISIT (OUTPATIENT)
Dept: TELEHEALTH | Age: 44
End: 2024-05-24

## 2024-05-24 DIAGNOSIS — R39.89 SUSPECTED UTI: Primary | ICD-10-CM

## 2024-05-25 RX ORDER — NITROFURANTOIN 25; 75 MG/1; MG/1
100 CAPSULE ORAL 2 TIMES DAILY
Qty: 10 CAPSULE | Refills: 0 | Status: SHIPPED | OUTPATIENT
Start: 2024-05-25 | End: 2024-05-30

## 2024-05-25 RX ORDER — PHENAZOPYRIDINE HYDROCHLORIDE 200 MG/1
200 TABLET, FILM COATED ORAL 3 TIMES DAILY PRN
Qty: 6 TABLET | Refills: 0 | Status: SHIPPED | OUTPATIENT
Start: 2024-05-25 | End: 2024-05-27

## 2024-05-25 NOTE — PROGRESS NOTES
Geeta Corcoran is a 43 year old female submitting e-visit for urinary symptoms.  HPI:   See answers to questionnaire and [facility] message exchange  UTI 8/2023 - treated with bactrim    Current Outpatient Medications   Medication Sig Dispense Refill    PEG 3350-KCl-Na Bicarb-NaCl 420 g Oral Recon Soln Take as directed by physician. 4000 mL 0    amphetamine-dextroamphetamine 15 MG Oral Tab Take 1 tablet (15 mg total) by mouth Noon.      amphetamine-dextroamphetamine 7.5 MG Oral Tab Take 1 tablet (7.5 mg total) by mouth daily.      VYVANSE 50 MG Oral Cap Take 1 capsule (50 mg total) by mouth every morning.      propranolol 10 MG Oral Tab Take 1 tablet (10 mg total) by mouth 2 (two) times daily.      Melatonin 10 MG Oral Cap Take 1 capsule by mouth every evening.      cetirizine 10 MG Oral Tab Take 1 tablet (10 mg total) by mouth daily.        Past Medical History:    Abnormal uterine bleeding    Anxiety    Arthritis    Back pain    Body piercing    Decorative tattoo    Diverticulitis      Past Surgical History:   Procedure Laterality Date    Cholecystectomy      Colonoscopy      Laparoscopic cholecystectomy  07/18/2018    Dr Barnhart    Laparoscopy,remove adnexa  03/31/2021    bilateral salpingectomy      Family History   Problem Relation Age of Onset    Cancer Mother         ovarian    Ovarian Cancer Mother 48    Other (Other) Mother         Hep C      Social History:  Social History     Socioeconomic History    Marital status:    Tobacco Use    Smoking status: Never    Smokeless tobacco: Never   Vaping Use    Vaping status: Never Used   Substance and Sexual Activity    Alcohol use: Yes     Alcohol/week: 1.0 - 2.0 standard drink of alcohol     Types: 1 - 2 Glasses of wine per week     Comment: cage done 1/31/2020    Drug use: No     Comment: CBD occ    Sexual activity: Yes     Partners: Male     Comment: [facility]  3/31/21   Other Topics Concern    Caffeine Concern Yes     Comment: one cup of coffee    Exercise No      Comment: NA currently    Seat Belt Yes         ASSESSMENT AND PLAN:       Diagnoses and all orders for this visit:    Suspected UTI  -     nitrofurantoin monohydrate macro (MACROBID) 100 MG Oral Cap; Take 1 capsule (100 mg total) by mouth 2 (two) times daily for 5 days.  -     phenazopyridine 200 MG Oral Tab; Take 1 tablet (200 mg total) by mouth 3 (three) times daily as needed for Pain.        Will treat with medication as listed.  Info provided on use, dose, and possible side effects  Supportive measures  Patient advised to follow up with PCP if no improvement or worsening of symptoms  Refer to [facility] message exchange for specific patient instructions      Duration of  the service:  9 minutes

## 2024-07-19 ENCOUNTER — APPOINTMENT (OUTPATIENT)
Dept: GENERAL RADIOLOGY | Age: 44
End: 2024-07-19
Attending: EMERGENCY MEDICINE
Payer: COMMERCIAL

## 2024-07-19 ENCOUNTER — HOSPITAL ENCOUNTER (EMERGENCY)
Age: 44
Discharge: HOME | End: 2024-07-19
Attending: EMERGENCY MEDICINE
Payer: COMMERCIAL

## 2024-07-19 VITALS
HEIGHT: 61 IN | WEIGHT: 120 LBS | RESPIRATION RATE: 16 BRPM | OXYGEN SATURATION: 100 % | DIASTOLIC BLOOD PRESSURE: 83 MMHG | SYSTOLIC BLOOD PRESSURE: 114 MMHG | TEMPERATURE: 97 F | HEART RATE: 63 BPM | BODY MASS INDEX: 22.66 KG/M2

## 2024-07-19 DIAGNOSIS — M62.830 SPASM OF THORACIC BACK MUSCLE: Primary | ICD-10-CM

## 2024-07-19 LAB — D DIMER PPP FEU-MCNC: <0.27 UG/ML FEU (ref ?–0.5)

## 2024-07-19 PROCEDURE — 71045 X-RAY EXAM CHEST 1 VIEW: CPT | Performed by: EMERGENCY MEDICINE

## 2024-07-19 PROCEDURE — 99284 EMERGENCY DEPT VISIT MOD MDM: CPT

## 2024-07-19 PROCEDURE — 96374 THER/PROPH/DIAG INJ IV PUSH: CPT

## 2024-07-19 PROCEDURE — 85379 FIBRIN DEGRADATION QUANT: CPT | Performed by: EMERGENCY MEDICINE

## 2024-07-19 RX ORDER — LIDOCAINE 50 MG/G
1 PATCH TOPICAL EVERY 24 HOURS
Qty: 10 PATCH | Refills: 0 | Status: SHIPPED | OUTPATIENT
Start: 2024-07-19

## 2024-07-19 RX ORDER — KETOROLAC TROMETHAMINE 30 MG/ML
30 INJECTION, SOLUTION INTRAMUSCULAR; INTRAVENOUS ONCE
Status: COMPLETED | OUTPATIENT
Start: 2024-07-19 | End: 2024-07-19

## 2024-07-19 RX ORDER — IBUPROFEN 600 MG/1
600 TABLET ORAL EVERY 8 HOURS PRN
Qty: 30 TABLET | Refills: 0 | Status: SHIPPED | OUTPATIENT
Start: 2024-07-19 | End: 2024-07-26

## 2024-07-19 RX ORDER — CYCLOBENZAPRINE HCL 10 MG
10 TABLET ORAL NIGHTLY
Qty: 10 TABLET | Refills: 0 | Status: SHIPPED | OUTPATIENT
Start: 2024-07-19 | End: 2024-07-29

## 2024-07-19 NOTE — ED INITIAL ASSESSMENT (HPI)
Mid upper back pain started suddenly this morning while standing, denies chest pain/shortness of breath, cough/fever, no injury   Refill request for Lipitor 40mg q hs #90 with 1 refill 1/6/22 next appt 1/6/23  Rx sent to pharmacy for same

## 2024-07-19 NOTE — ED PROVIDER NOTES
Patient Seen in: [facility] In [place]      History     Chief Complaint   Patient presents with    Back Pain     Stated Complaint: mid upper back pain, sharp    Subjective:   HPI    ***    Objective:   Past Medical History:    Abnormal uterine bleeding    Anxiety    Arthritis    Back pain    Body piercing    Decorative tattoo    Diverticulitis              Past Surgical History:   Procedure Laterality Date    Cholecystectomy      Colonoscopy      Laparoscopic cholecystectomy  07/18/2018    Dr Barnhart    Laparoscopy,remove adnexa  03/31/2021    bilateral salpingectomy                Social History     Socioeconomic History    Marital status:    Tobacco Use    Smoking status: Never    Smokeless tobacco: Never   Vaping Use    Vaping status: Never Used   Substance and Sexual Activity    Alcohol use: Yes     Alcohol/week: 1.0 - 2.0 standard drink of alcohol     Types: 1 - 2 Glasses of wine per week     Comment: cage done 1/31/2020    Drug use: No     Comment: CBD occ    Sexual activity: Yes     Partners: Male     Comment: BSO  3/31/21   Other Topics Concern    Caffeine Concern Yes     Comment: one cup of coffee    Exercise No     Comment: NA currently    Seat Belt Yes              Review of Systems    Positive for stated Chief Complaint: Back Pain    Other systems are as noted in HPI.  Constitutional and vital signs reviewed.      All other systems reviewed and negative except as noted above.    Physical Exam     ED Triage Vitals [07/19/24 0708]   /66   Pulse 89   Resp 16   Temp 97.1 °F (36.2 °C)   Temp src Temporal   SpO2 100 %   O2 Device None (Room air)       Current Vitals:   Vital Signs  BP: 114/83  Pulse: 63  Resp: 16  Temp: 97.1 °F (36.2 °C)  Temp src: Temporal    Oxygen Therapy  SpO2: 100 %  O2 Device: None (Room air)            Physical Exam    ***      ED Course     Labs Reviewed   D-DIMER - Normal             ***         MDM      ***                                    MDM    Disposition and Plan     Clinical Impression:  No diagnosis found.     Disposition:  There is no disposition on file for this visit.  There is no disposition time on file for this visit.    Follow-up:  No follow-up provider specified.        Medications Prescribed:  Current Discharge Medication List

## 2024-10-25 ENCOUNTER — PATIENT MESSAGE (OUTPATIENT)
Dept: INTERNAL MEDICINE CLINIC | Facility: CLINIC | Age: 44
End: 2024-10-25

## 2024-10-25 DIAGNOSIS — Z12.31 ENCOUNTER FOR SCREENING MAMMOGRAM FOR MALIGNANT NEOPLASM OF BREAST: Primary | ICD-10-CM

## 2024-10-26 NOTE — TELEPHONE ENCOUNTER
LOV 1/17/24  Last mammogram screening 11/24/23  RECOMMENDATIONS:    ROUTINE MAMMOGRAM AND CLINICAL EVALUATION IN 12 MONTHS.     Mammogram order placed per protocol

## 2024-11-27 ENCOUNTER — HOSPITAL ENCOUNTER (OUTPATIENT)
Dept: MAMMOGRAPHY | Age: 44
Discharge: HOME | End: 2024-11-27
Attending: INTERNAL MEDICINE
Payer: COMMERCIAL

## 2024-11-27 DIAGNOSIS — Z12.31 ENCOUNTER FOR SCREENING MAMMOGRAM FOR MALIGNANT NEOPLASM OF BREAST: ICD-10-CM

## 2024-11-27 PROCEDURE — 77063 BREAST TOMOSYNTHESIS BI: CPT | Performed by: INTERNAL MEDICINE

## 2024-11-27 PROCEDURE — 77067 SCR MAMMO BI INCL CAD: CPT | Performed by: INTERNAL MEDICINE

## 2024-11-29 ENCOUNTER — OFFICE VISIT (OUTPATIENT)
Dept: OBGYN CLINIC | Facility: CLINIC | Age: 44
End: 2024-11-29
Payer: COMMERCIAL

## 2024-11-29 VITALS
HEIGHT: 63 IN | DIASTOLIC BLOOD PRESSURE: 62 MMHG | BODY MASS INDEX: 21.23 KG/M2 | HEART RATE: 75 BPM | SYSTOLIC BLOOD PRESSURE: 100 MMHG | WEIGHT: 119.81 LBS

## 2024-11-29 DIAGNOSIS — Z01.419 WELL WOMAN EXAM WITH ROUTINE GYNECOLOGICAL EXAM: Primary | ICD-10-CM

## 2024-11-29 DIAGNOSIS — N92.4 ABNORMAL PERIMENOPAUSAL BLEEDING: ICD-10-CM

## 2024-11-29 PROCEDURE — 99396 PREV VISIT EST AGE 40-64: CPT | Performed by: STUDENT IN AN ORGANIZED HEALTH CARE EDUCATION/TRAINING PROGRAM

## 2024-11-29 RX ORDER — DEXTROAMPHETAMINE SACCHARATE, AMPHETAMINE ASPARTATE, DEXTROAMPHETAMINE SULFATE AND AMPHETAMINE SULFATE 5; 5; 5; 5 MG/1; MG/1; MG/1; MG/1
0.5 TABLET ORAL 2 TIMES DAILY
COMMUNITY
Start: 2024-10-13

## 2024-11-29 NOTE — PROGRESS NOTES
[facility]  Obstetrics and Gynecology  History & Physical    CC: Patient presents for a well woman exam     Subjective:     HPI: Geeta Corcoran is a 43 year old  female here for a well women exam.  Patient was last seen 2021 for annual visit.  At that time patient was starting to have symptoms of perimenopause including irregular bleeding as well as vasomotor symptoms including hot flashes.  Patient states she feels like the symptoms occurred right around the time of her tubal ligation, although she believes this is more of an incidental correlation. Lab work performed at that time was inconclusive although estrogen levels were slightly decreased.     Has one older sister, unsure if she is menopausal  Her mom went through menopause age 50  Nonsmoker  She is currently exercising 6 days per week. Currently on Vyvanse and Adderall supplement for ADHD as well as propranolol for anxiety.    She states her periods have significantly decreased since last being seen.  States 1 cycle in , in  she believes she has had less than 4 cycles.  States vasomotor symptoms are overall manageable and much improved compared to the year before.  Denies any vaginal atrophy, irritation, or abnormal discharge.  Denies symptoms of worsening fatigue, brain fog, night sweats, or mood swings.  She does state some mood swings but feels like it was more due to new medication Vyvanse then attribute all to perimenopause.  Is interested in repeating hormonal testing.    OB History:  OB History    Para Term  AB Living   2 2 2 0 0 2   SAB IAB Ectopic Multiple Live Births   0 0 0 0 2      # Outcome Date GA Lbr Andrea/2nd Weight Sex Type Anes PTL Lv   2 Term 18 38w1d 03:39 / 00:49 6 lb 7.7 oz (2.94 kg) F NORMAL SPONT EPI N BENOIT      Complications: Group B beta strep +   1 Term  38w0d  6 lb (2.722 kg) F NORMAL SPONT EPI N BENOIT       Gyne History:  Hx Prior Abnormal Pap: No  Pap Date: 21  Pap Result  Notes: WNL  No LMP recorded (lmp unknown). (Menstrual status: Tubal Ligation).    no history of STDs (non-HPV).    Sexual history: Active? Yes, with   Birth control? S/p tubal ligation    Meds:  Medications Ordered Prior to Encounter[1]    All:  Allergies[2]    PMH:  Past Medical History:    Abnormal uterine bleeding    Anxiety    Arthritis    Back pain    Body piercing    Decorative tattoo    Diverticulitis       Immunization History:   Immunization History   Administered Date(s) Administered    Covid-19 Vaccine Moderna 100 mcg/0.5 ml 02/11/2021, 03/11/2021    Covid-19 Vaccine Pfizer Bivalent 30mcg/0.3mL 10/08/2022    FLU VAC QIV SPLIT 3 YRS AND OLDER (94671) 10/23/2020    FLULAVAL 6 months & older 0.5 ml Prefilled syringe (05651) 11/15/2017, 01/21/2019    FLUZONE 6 months and older PFS 0.5 ml (18168) 11/15/2017, 01/21/2019, 10/23/2022    Influenza 10/01/2019, 10/23/2020, 11/20/2021    TDAP 11/15/2017    Tb Intradermal Test 08/16/2018       PSH:  Past Surgical History:   Procedure Laterality Date    Cholecystectomy      Colonoscopy      Laparoscopic cholecystectomy  07/18/2018    Dr Barnhart    Laparoscopy,remove adnexa  03/31/2021    bilateral salpingectomy       Social History:  Social History     Socioeconomic History    Marital status:      Spouse name: Not on file    Number of children: Not on file    Years of education: Not on file    Highest education level: Not on file   Occupational History    Not on file   Tobacco Use    Smoking status: Never    Smokeless tobacco: Never   Vaping Use    Vaping status: Never Used   Substance and Sexual Activity    Alcohol use: Yes     Alcohol/week: 1.0 - 2.0 standard drink of alcohol     Types: 1 - 2 Glasses of wine per week     Comment: socially    Drug use: Yes     Types: Cannabis     Comment: CBD occ    Sexual activity: Yes     Partners: Male     Birth control/protection: Tubal Ligation     Comment: BSO  3/31/21   Other Topics Concern     Service Not  Asked    Blood Transfusions Not Asked    Caffeine Concern Yes     Comment: one cup of coffee    Occupational Exposure Not Asked    Hobby Hazards Not Asked    Sleep Concern Not Asked    Stress Concern Not Asked    Weight Concern Not Asked    Special Diet Not Asked    Back Care Not Asked    Exercise No     Comment: NA currently    Bike Helmet Not Asked    Seat Belt Yes    Self-Exams Not Asked   Social History Narrative    Not on file     Social Drivers of Health     Financial Resource Strain: Not on file   Food Insecurity: Not on file   Transportation Needs: Not on file   Physical Activity: Not on file   Stress: Not on file   Social Connections: Not on file   Housing Stability: Not on file         Patient feels unsafe or threatened?: no    Abuse: no physical, sexual or mental.     Family History:  Family History   Problem Relation Age of Onset    Cancer Mother         ovarian    Ovarian Cancer Mother 48    Other (Other) Mother         Hep C       Health maintenance:  Mammogram (age 38 and q1-2yr): BIRADS 1 11/2024, repeat in 1 year  Colonoscopy (age 43 and q10yr): 4/2024 normal, repeat in 10 years    Review of Systems:  General:  denies fevers, chills, fatigue and malaise  Breast:  denies rashes, skin changes, pain, lumps or discharge   Respiratory:  denies SOB, dyspnea, cough or wheezing  Cardiovascular:  denies chest pain, palpitations  GI: denies abdominal pain, diarrhea, constipation  :  denies dysuria, hematuria, increased urinary frequency.   Heme:  denies history of excessive bleeding or bruising      Objective:     Vitals:    11/29/24 1040   BP: 100/62   Pulse: 75   Weight: 119 lb 12.8 oz (54.3 kg)   Height: 63\"         Body mass index is 21.22 kg/m².    General: AAO.NAD.   CVS exam: normal peripheral perfusion  Chest: non-labored breathing, no tachypnea   Breast:  symmetric, no dominant or suspicious mass, no skin or nipple changes and no axillary adenopathy  Abdominal exam:  soft, nontender,  nondistended  Pelvic exam:   VULVA: normal appearing vulva with no masses, tenderness or lesions  PERINEUM:  normal appearing, no lesions   URETHRAL MEATUS:  normal appearing, no lesions   VAGINA: normal appearing vagina with normal color and discharge, no lesions  CERVIX: normal appearing cervix without discharge or lesions  UTERUS: uterus is normal size, shape, consistency and nontender  ADNEXA: normal adnexa in size, nontender and no masses  PERIRECTAL:  normal appearing, no lesions   Ext: non-tender, no edema    Assessment:     Geeta Corcoran is a 43 year old  female here for a well women exam.     Patient Active Problem List   Diagnosis    Acute cholecystitis    Acute right-sided low back pain    Radicular pain of right lower extremity    Lumbosacral radiculopathy    Sterilization consult    Piriformis syndrome of right side    Diarrhea of presumed infectious origin    Special screening for malignant neoplasm of colon    Abnormal perimenopausal bleeding         Plan:     Cervical cancer screening  - discussion held with the patient about ASCCP guidelines  - repeat pap smear due in 2026  - declined STI testing today  Health maintenance  - encouraged to maintain weight at healthy BMI  - discussed importance of exercise and healthy eating  - self breast exam instructions provided   - bilateral screening mammogram recommendations discussed; per PCP   - referral for colon cancer screening d/w patient; per PCP, up to date  Perimenopause  - Pt with irregular menses for past 3 years, reports 1 period in last year  - Repeat hormone tests ordered today (FSH/LH, estradiol, testosterone)  - Pelvic US ordered to rule out structural abnormalities of irregular bleeding  - Discussed medical management of vasomotor symptoms of menopause and patient would like to defer at this time       Problem List Items Addressed This Visit       Abnormal perimenopausal bleeding    Relevant Orders    US PELVIS (TRANSABDOMINAL AND  TRANSVAGINAL) (CPT=76856/94287)    FSH    LH (Luteinizing Hormone)    Estradiol    Testosterone, Total And Free     Other Visit Diagnoses       Well woman exam with routine gynecological exam    -  Primary                  All of the findings and plan were discussed with the patient.  She notes understanding and agrees with the plan of care.  All questions were answered to the best of my ability at this time.      RTC in 1 year for a well woman exam or sooner if needed     Mandi Hess DO   [facility] - OBGYN      Discussed with patient that there will not be further notification of normal or benign results other than receiving results on [employer]. A eyefactive message or telephone call will be placed by the physician and/or office staff if results are abnormal.       Note to patient and family   The 21st Century Cures Act makes medical notes available to patients in the interest of transparency.  However, please be advised that this is a medical document.  It is intended as [id number]-[id number] communication.  It is written and medical language may contain abbreviations or verbiage that are technical and unfamiliar.  It may appear blunt or direct.  Medical documents are intended to carry relevant information, facts as evident, and the clinical opinion of the practitioner.      This note could include assistance by Dragon voice recognition. Errors in content may be related to improper recognition by the system; efforts to review and correct have been done but errors may still exist.          [1]   Current Outpatient Medications on File Prior to Visit   Medication Sig Dispense Refill    amphetamine-dextroamphetamine 20 MG Oral Tab Take 0.5 tablets by mouth 2 (two) times daily.      VYVANSE 50 MG Oral Cap Take 1 capsule (50 mg total) by mouth every morning.      propranolol 10 MG Oral Tab Take 1 tablet (10 mg total) by mouth 2 (two) times daily.      Melatonin 10 MG Oral Cap Take 1 capsule by mouth every evening.      cetirizine  10 MG Oral Tab Take 1 tablet (10 mg total) by mouth daily.      lidocaine 5 % External Patch Place 1 patch onto the skin daily. 10 patch 0    PEG 3350-KCl-Na Bicarb-NaCl 420 g Oral Recon Soln Take as directed by physician. (Patient not taking: Reported on 11/29/2024) 4000 mL 0    amphetamine-dextroamphetamine 15 MG Oral Tab Take 1 tablet (15 mg total) by mouth Noon.      amphetamine-dextroamphetamine 7.5 MG Oral Tab Take 1 tablet (7.5 mg total) by mouth daily.       No current facility-administered medications on file prior to visit.   [2] No Known Allergies

## 2024-12-23 ENCOUNTER — NURSE TRIAGE (OUTPATIENT)
Dept: INTERNAL MEDICINE CLINIC | Facility: CLINIC | Age: 44
End: 2024-12-23

## 2024-12-23 ENCOUNTER — HOSPITAL ENCOUNTER (EMERGENCY)
Age: 44
Discharge: HOME | End: 2024-12-23
Attending: EMERGENCY MEDICINE
Payer: COMMERCIAL

## 2024-12-23 VITALS
HEIGHT: 61 IN | BODY MASS INDEX: 22.66 KG/M2 | TEMPERATURE: 99 F | SYSTOLIC BLOOD PRESSURE: 131 MMHG | DIASTOLIC BLOOD PRESSURE: 90 MMHG | WEIGHT: 120 LBS | RESPIRATION RATE: 16 BRPM | HEART RATE: 69 BPM | OXYGEN SATURATION: 100 %

## 2024-12-23 DIAGNOSIS — N30.91 HEMORRHAGIC CYSTITIS: Primary | ICD-10-CM

## 2024-12-23 LAB
B-HCG UR QL: NEGATIVE
BILIRUB UR QL STRIP.AUTO: NEGATIVE
CLARITY UR REFRACT.AUTO: CLEAR
COLOR UR AUTO: YELLOW
GLUCOSE UR STRIP.AUTO-MCNC: NEGATIVE MG/DL
KETONES UR STRIP.AUTO-MCNC: NEGATIVE MG/DL
NITRITE UR QL STRIP.AUTO: NEGATIVE
PH UR STRIP.AUTO: 6 [PH] (ref 5–8)
PROT UR STRIP.AUTO-MCNC: NEGATIVE MG/DL
RBC #/AREA URNS AUTO: >10 /HPF
SP GR UR STRIP.AUTO: 1.01 (ref 1–1.03)
UROBILINOGEN UR STRIP.AUTO-MCNC: 0.2 MG/DL

## 2024-12-23 PROCEDURE — 81001 URINALYSIS AUTO W/SCOPE: CPT | Performed by: EMERGENCY MEDICINE

## 2024-12-23 PROCEDURE — 99283 EMERGENCY DEPT VISIT LOW MDM: CPT

## 2024-12-23 PROCEDURE — 81015 MICROSCOPIC EXAM OF URINE: CPT

## 2024-12-23 PROCEDURE — 87086 URINE CULTURE/COLONY COUNT: CPT | Performed by: EMERGENCY MEDICINE

## 2024-12-23 PROCEDURE — 81025 URINE PREGNANCY TEST: CPT

## 2024-12-23 PROCEDURE — 87086 URINE CULTURE/COLONY COUNT: CPT

## 2024-12-23 PROCEDURE — 81001 URINALYSIS AUTO W/SCOPE: CPT

## 2024-12-23 PROCEDURE — 99284 EMERGENCY DEPT VISIT MOD MDM: CPT

## 2024-12-23 RX ORDER — CEPHALEXIN 500 MG/1
500 CAPSULE ORAL 3 TIMES DAILY
Qty: 15 CAPSULE | Refills: 0 | Status: SHIPPED | OUTPATIENT
Start: 2024-12-23 | End: 2024-12-28

## 2024-12-23 NOTE — ED INITIAL ASSESSMENT (HPI)
Noticed frequency, pain and blood in urine this am- intermittent lower abd discomfort- went to IC and sent here

## 2024-12-23 NOTE — TELEPHONE ENCOUNTER
Action Requested: Summary for Provider     []  Critical Lab, Recommendations Needed  [] Need Additional Advice  []   FYI    []   Need Orders  [] Need Medications Sent to Pharmacy  []  Other     SUMMARY: Pt is on her way now to [facility] for eval of acute symptoms. Pt requesting to schedule follow up since she has been getting frequent UTIs. Last UTI was a month ago.     Reason for call: Urinary Symptoms  Onset: Today   Reason for Disposition   Side (flank) or lower back pain present    Protocols used: Urination Pain - Female-A-OH    C/o blood in urine and pain with urination   Stated was pink/red, toilet bowl turned that color   Denies fever  C/o mild back pain and abd pain   Stated she gets frequent UTI, last one was one month ago  Pt stated her  is a physician   No apts here. Advised [facility] for eval. Pt requesting an appointment for f/u. Notified can schedule appt however, needs to be evaluated today for acute symptoms. Pt verbalizes understanding and stated she is on her way there now.     Future Appointments   Date Time Provider Department Center   12/26/2024 11:40 AM Chana Lima MD [id number] [facility]

## 2024-12-23 NOTE — ED PROVIDER NOTES
Patient Seen in: [facility] In [place]      History     Chief Complaint   Patient presents with    Abdomen/Flank Pain     Stated Complaint: right abd pain onset and pain with urination, sent from IC for cat scan    Subjective:   HPI      44-year-old female with a past medical history as below including frequent UTIs presents with dysuria that started this morning.  Patient also notes slight pink tinge to the urine.  Patient states symptoms are similar to previous UTIs.  Patient states she had a mild pain in her lower back earlier that has resolved.  Patient states she went to immediate care was sent to ED for concerns of kidney stone.  Patient denies previous history of kidney stones.  She denies any flank pain.  Denies fever or chills.  Denies nausea or vomiting.    Objective:     Past Medical History:    Abnormal uterine bleeding    Anxiety    Arthritis    Back pain    Body piercing    Decorative tattoo    Diverticulitis              Past Surgical History:   Procedure Laterality Date    Cholecystectomy      Colonoscopy      Laparoscopic cholecystectomy  07/18/2018    Dr Barnhart    Laparoscopy,remove adnexa  03/31/2021    bilateral salpingectomy                Social History     Socioeconomic History    Marital status:    Tobacco Use    Smoking status: Never    Smokeless tobacco: Never   Vaping Use    Vaping status: Never Used   Substance and Sexual Activity    Alcohol use: Yes     Alcohol/week: 1.0 - 2.0 standard drink of alcohol     Types: 1 - 2 Glasses of wine per week     Comment: socially    Drug use: Yes     Types: Cannabis     Comment: CBD occ    Sexual activity: Yes     Partners: Male     Birth control/protection: Tubal Ligation     Comment: BSO  3/31/21   Other Topics Concern    Caffeine Concern Yes     Comment: one cup of coffee    Exercise No     Comment: NA currently    Seat Belt Yes                  Physical Exam     ED Triage Vitals [12/23/24 1311]   /90   Pulse 69    Resp 16   Temp 98.9 °F (37.2 °C)   Temp src Temporal   SpO2 100 %   O2 Device None (Room air)       Current Vitals:   Vital Signs  BP: 131/90  Pulse: 69  Resp: 16  Temp: 98.9 °F (37.2 °C)  Temp src: Temporal    Oxygen Therapy  SpO2: 100 %  O2 Device: None (Room air)        Physical Exam  Vitals and nursing note reviewed.   Constitutional:       Appearance: She is well-developed.   HENT:      Head: Normocephalic and atraumatic.      Mouth/Throat:      Mouth: Mucous membranes are moist.   Eyes:      General: No scleral icterus.  Abdominal:      General: Bowel sounds are normal. There is no distension.      Palpations: Abdomen is soft.      Tenderness: There is no abdominal tenderness. There is no right CVA tenderness or left CVA tenderness.   Musculoskeletal:      Comments: No lumbar tenderness   Skin:     General: Skin is warm and dry.   Neurological:      General: No focal deficit present.      Mental Status: She is alert and oriented to person, place, and time.      Cranial Nerves: No cranial nerve deficit.      Motor: No weakness.   Psychiatric:         Mood and Affect: Mood normal.         Behavior: Behavior normal.             ED Course     Labs Reviewed   URINALYSIS WITH CULTURE REFLEX - Abnormal; Notable for the following components:       Result Value    Blood Urine Large (*)     Leukocyte Esterase Urine Moderate (*)     All other components within normal limits   UA MICROSCOPIC ONLY, URINE - Abnormal; Notable for the following components:    WBC Urine 21-50 (*)     RBC Urine >10 (*)     Bacteria Urine Rare (*)     Squamous Epi. Cells Moderate (*)     All other components within normal limits   POCT PREGNANCY URINE - Normal   URINE CULTURE, ROUTINE                   MDM      44-year-old female with a past medical history as below including frequent UTIs presents with dysuria that started this morning.      Differential includes but is not limited to UTI, interstitial cystitis, overactive bladder.      UA  shows strong evidence UTI.  Patient states symptoms are similar to previous UTIs she has had.  Shared decision making used discussed risks/benefits of CT along with labs and will defer at this time as patient's clinical symptoms and exam findings are concerning for kidney stones.  Will give Rx for Keflex for UTI pending culture results.  Return precaution discussed.        Medical Decision Making  Amount and/or Complexity of Data Reviewed  Labs: ordered. Decision-making details documented in ED Course.    Risk  Prescription drug management.        Disposition and Plan     Clinical Impression:  1. Hemorrhagic cystitis         Disposition:  Discharge  12/23/2024  3:01 pm    Follow-up:  No follow-up provider specified.        Medications Prescribed:  Current Discharge Medication List        START taking these medications    Details   cephALEXin 500 MG Oral Cap Take 1 capsule (500 mg total) by mouth 3 (three) times daily for 5 days.  Qty: 15 capsule, Refills: 0                 Supplementary Documentation:

## 2024-12-26 ENCOUNTER — OFFICE VISIT (OUTPATIENT)
Dept: INTERNAL MEDICINE CLINIC | Facility: CLINIC | Age: 44
End: 2024-12-26
Payer: COMMERCIAL

## 2024-12-26 VITALS
DIASTOLIC BLOOD PRESSURE: 60 MMHG | TEMPERATURE: 98 F | HEART RATE: 76 BPM | OXYGEN SATURATION: 100 % | SYSTOLIC BLOOD PRESSURE: 102 MMHG | HEIGHT: 63.39 IN | WEIGHT: 118.81 LBS | BODY MASS INDEX: 20.79 KG/M2 | RESPIRATION RATE: 18 BRPM

## 2024-12-26 DIAGNOSIS — R30.0 DYSURIA: Primary | ICD-10-CM

## 2024-12-26 DIAGNOSIS — N39.0 FREQUENT UTI: ICD-10-CM

## 2024-12-26 PROCEDURE — 99213 OFFICE O/P EST LOW 20 MIN: CPT | Performed by: INTERNAL MEDICINE

## 2024-12-26 RX ORDER — CIPROFLOXACIN 250 MG/1
250 TABLET, FILM COATED ORAL 2 TIMES DAILY
COMMUNITY
Start: 2024-11-25

## 2024-12-26 NOTE — PROGRESS NOTES
Geeta Corcoran is a 44 year old female.    Chief Complaint   Patient presents with    UTI     ES rm - 4 - Frequent UTI's with painful stream has resolved.       HPI:     Pleasant  patient here for follow up visit for UTI. She first went to UC for dysuria times one day on 12/23/24 who then directed her to er for further evaluation.  Results in UC came back with klebsiella uti. Patient was given keflex and is feeling well, no symptoms since evening of 12/23. No SE reported on ABX.  She tends to get UTI after sexual activity with her , believes 3-4 UTI per year.   Interestingly urine cx from same day in er came back as probably contamination ( urgent care showed klebsiella)    Patient Active Problem List   Diagnosis    Acute cholecystitis    Acute right-sided low back pain    Radicular pain of right lower extremity    Lumbosacral radiculopathy    Sterilization consult    Piriformis syndrome of right side    Diarrhea of presumed infectious origin    Special screening for malignant neoplasm of colon    Abnormal perimenopausal bleeding     Current Outpatient Medications   Medication Sig Dispense Refill    ciprofloxacin 250 MG Oral Tab Take 1 tablet (250 mg total) by mouth 2 (two) times daily.      cephALEXin 500 MG Oral Cap Take 1 capsule (500 mg total) by mouth 3 (three) times daily for 5 days. 15 capsule 0    amphetamine-dextroamphetamine 20 MG Oral Tab Take 0.5 tablets by mouth 2 (two) times daily.      VYVANSE 50 MG Oral Cap Take 1 capsule (50 mg total) by mouth every morning.      propranolol 10 MG Oral Tab Take 1 tablet (10 mg total) by mouth 2 (two) times daily.      Melatonin 10 MG Oral Cap Take 1 capsule by mouth every evening.      cetirizine 10 MG Oral Tab Take 1 tablet (10 mg total) by mouth daily.      lidocaine 5 % External Patch Place 1 patch onto the skin daily. (Patient not taking: Reported on 12/26/2024) 10 patch 0      Past Medical History:    Abnormal uterine bleeding    Anxiety     Arthritis    Back pain    Body piercing    Decorative tattoo    Diverticulitis      Social History:  Social History     Socioeconomic History    Marital status:    Tobacco Use    Smoking status: Never    Smokeless tobacco: Never   Vaping Use    Vaping status: Never Used   Substance and Sexual Activity    Alcohol use: Yes     Alcohol/week: 1.0 - 2.0 standard drink of alcohol     Types: 1 - 2 Glasses of wine per week     Comment: socially    Drug use: Yes     Types: Cannabis     Comment: CBD occ    Sexual activity: Yes     Partners: Male     Birth control/protection: Tubal Ligation     Comment: BSO  3/31/21   Other Topics Concern    Caffeine Concern Yes     Comment: one cup of coffee    Exercise No     Comment: NA currently    Seat Belt Yes     Family History   Problem Relation Age of Onset    Cancer Mother         ovarian    Ovarian Cancer Mother 48    Other (Other) Mother         Hep C        Allergies  Allergies[1]      REVIEW OF SYSTEMS:   GENERAL HEALTH:  no fevers   RESPIRATORY: no cough  CARDIOVASCULAR: denies chest pain  GI: denies abdominal pain  : no dysuria now    EXAM:   /60 (BP Location: Left arm, Patient Position: Sitting, Cuff Size: adult)   Pulse 76   Temp 97.6 °F (36.4 °C) (Temporal)   Resp 18   Ht 5' 3.39\" (1.61 m)   Wt 118 lb 12.8 oz (53.9 kg)   LMP  (LMP Unknown)   SpO2 100%   BMI 20.79 kg/m²   GENERAL: well developed, NAD  LUNGS: normal rate without respiratory distress, lungs clear to auscultation  CARDIO: RRR nl S1 S2  GI: normal bowel sounds, soft, NT/ND  EXTREMITIES: no cyanosis, clubbing or edema  NEURO: Alert and oriented    ASSESSMENT AND PLAN:     Encounter Diagnoses   Name     Dysuria/klebsiella uti (cx taken in urgent care and I was able to see results on her phone)- doing well, pt to finish keflex course     Frequent UTI- discussed preventive measures including cranberry supplement, referred to urology         No orders of the defined types were placed in this  encounter.      Meds & Refills for this Visit:  Requested Prescriptions      No prescriptions requested or ordered in this encounter       Imaging & Consults:  UROLOGY - INTERNAL    Return if symptoms worsen or fail to improve.  There are no Patient Instructions on file for this visit.      The patient indicates understanding of these issues and agrees to the plan.           [1] No Known Allergies

## 2024-12-27 ENCOUNTER — LAB ENCOUNTER (OUTPATIENT)
Dept: LAB | Age: 44
End: 2024-12-27
Attending: STUDENT IN AN ORGANIZED HEALTH CARE EDUCATION/TRAINING PROGRAM
Payer: COMMERCIAL

## 2024-12-27 DIAGNOSIS — N92.4 ABNORMAL PERIMENOPAUSAL BLEEDING: ICD-10-CM

## 2024-12-27 LAB
ESTRADIOL SERPL-MCNC: <11.8 PG/ML
FSH SERPL-ACNC: 129 MIU/ML
LH SERPL-ACNC: 48.9 MIU/ML

## 2024-12-27 PROCEDURE — 83002 ASSAY OF GONADOTROPIN (LH): CPT

## 2024-12-27 PROCEDURE — 83001 ASSAY OF GONADOTROPIN (FSH): CPT

## 2024-12-27 PROCEDURE — 82670 ASSAY OF TOTAL ESTRADIOL: CPT

## 2024-12-27 PROCEDURE — 36415 COLL VENOUS BLD VENIPUNCTURE: CPT

## 2024-12-27 PROCEDURE — 84402 ASSAY OF FREE TESTOSTERONE: CPT

## 2024-12-27 PROCEDURE — 84403 ASSAY OF TOTAL TESTOSTERONE: CPT

## 2024-12-28 LAB
FREE TESTOST DIRECT: 0.9 PG/ML
TESTOSTERONE: <3 NG/DL

## 2025-01-10 ENCOUNTER — HOSPITAL ENCOUNTER (OUTPATIENT)
Dept: ULTRASOUND IMAGING | Age: 45
Discharge: HOME | End: 2025-01-10
Attending: STUDENT IN AN ORGANIZED HEALTH CARE EDUCATION/TRAINING PROGRAM
Payer: COMMERCIAL

## 2025-01-10 DIAGNOSIS — N92.4 ABNORMAL PERIMENOPAUSAL BLEEDING: ICD-10-CM

## 2025-01-10 PROCEDURE — 76856 US EXAM PELVIC COMPLETE: CPT | Performed by: STUDENT IN AN ORGANIZED HEALTH CARE EDUCATION/TRAINING PROGRAM

## 2025-01-10 PROCEDURE — 76830 TRANSVAGINAL US NON-OB: CPT | Performed by: STUDENT IN AN ORGANIZED HEALTH CARE EDUCATION/TRAINING PROGRAM

## 2025-02-03 ENCOUNTER — E-VISIT (OUTPATIENT)
Dept: TELEHEALTH | Age: 45
End: 2025-02-03
Payer: COMMERCIAL

## 2025-02-03 DIAGNOSIS — U07.1 COVID: Primary | ICD-10-CM

## 2025-02-03 DIAGNOSIS — U07.1 POSITIVE SELF-ADMINISTERED ANTIGEN TEST FOR COVID-19: ICD-10-CM

## 2025-02-03 LAB — AMB EXT COVID-19 RESULT: DETECTED

## 2025-02-04 PROCEDURE — 99421 OL DIG E/M SVC 5-10 MIN: CPT | Performed by: PHYSICIAN ASSISTANT

## 2025-02-04 NOTE — PROGRESS NOTES
Geeta Corcoran is a 44 year old female who initiated e-visit care today.    HPI:   See answers to questionnaire submission     Current Outpatient Medications   Medication Sig Dispense Refill    amphetamine-dextroamphetamine 20 MG Oral Tab Take 0.5 tablets by mouth 2 (two) times daily.      VYVANSE 50 MG Oral Cap Take 1 capsule (50 mg total) by mouth every morning.      propranolol 10 MG Oral Tab Take 1 tablet (10 mg total) by mouth 2 (two) times daily.      Melatonin 10 MG Oral Cap Take 1 capsule by mouth every evening.      cetirizine 10 MG Oral Tab Take 1 tablet (10 mg total) by mouth daily.        Past Medical History:    Abnormal uterine bleeding    Anxiety    Arthritis    Back pain    Body piercing    Decorative tattoo    Diverticulitis      Past Surgical History:   Procedure Laterality Date    Cholecystectomy      Colonoscopy      Laparoscopic cholecystectomy  07/18/2018    Dr Barnhart    Laparoscopy,remove adnexa  03/31/2021    bilateral salpingectomy      Family History   Problem Relation Age of Onset    Cancer Mother         ovarian    Ovarian Cancer Mother 48    Other (Other) Mother         Hep C      Social History:  Social History     Socioeconomic History    Marital status:    Tobacco Use    Smoking status: Never    Smokeless tobacco: Never   Vaping Use    Vaping status: Never Used   Substance and Sexual Activity    Alcohol use: Yes     Alcohol/week: 1.0 - 2.0 standard drink of alcohol     Types: 1 - 2 Glasses of wine per week     Comment: socially    Drug use: Yes     Types: Cannabis     Comment: CBD occ    Sexual activity: Yes     Partners: Male     Birth control/protection: Tubal Ligation     Comment: BSO  3/31/21   Other Topics Concern    Caffeine Concern Yes     Comment: one cup of coffee    Exercise No     Comment: NA currently    Seat Belt Yes         ASSESSMENT AND PLAN:       Diagnoses and all orders for this visit:    COVID  -     nirmatrelvir-ritonavir 300-100 MG Oral Tablet Therapy  Pack; Take two nirmatrelvir tablets (300mg) with one ritonavir tablet (100mg) together twice daily for 5 days.    Positive self-administered antigen test for COVID-19         The patient has been deemed a candidate for Paxlovid.  Symptoms began less than 5 days ago  Patient does not require hospitalization.       Labs reveal o significant history of liver or kidney problems/conditions.   Per [place] drug interaction : none    Discussed use, dose, and possible side effects.    The patient agrees to treatment with Paxlovid.   Advised to continue to OTC medications as needed and use infection control measures according to CDC guidelines.    Duration of  the service:  10 minutes      See [facility] message exchange and Patient Instructions for Comfort Care and patient education.

## 2025-07-21 ENCOUNTER — OFFICE VISIT (OUTPATIENT)
Dept: OBGYN CLINIC | Facility: CLINIC | Age: 45
End: 2025-07-21
Payer: COMMERCIAL

## 2025-07-21 VITALS
WEIGHT: 115 LBS | HEIGHT: 63 IN | BODY MASS INDEX: 20.37 KG/M2 | HEART RATE: 82 BPM | DIASTOLIC BLOOD PRESSURE: 82 MMHG | SYSTOLIC BLOOD PRESSURE: 108 MMHG

## 2025-07-21 DIAGNOSIS — N95.2 ATROPHIC VAGINITIS: Primary | ICD-10-CM

## 2025-07-21 RX ORDER — LISDEXAMFETAMINE DIMESYLATE 60 MG/1
CAPSULE ORAL
COMMUNITY
Start: 2025-04-14

## 2025-07-21 RX ORDER — CONJUGATED ESTROGENS 0.62 MG/G
0.5 CREAM VAGINAL AS DIRECTED
Qty: 42.5 G | Refills: 11 | Status: SHIPPED | OUTPATIENT
Start: 2025-07-21

## 2025-07-21 NOTE — PATIENT INSTRUCTIONS
Atrophic Vaginitis    Atrophic vaginitis means the walls of your vagina have become thin. This happens when your body makes too little of the hormone estrogen.   Menopause or surgical removal of the ovaries are the most common causes of a drop in estrogen. Breastfeeding can also cause the hormone level to drop.   Symptoms of atrophic vaginitis include:   Dryness, soreness, burning, or itching in the vagina.  Vaginal discharge.  Sex can be uncomfortable, even painful. After sex, you may have bleeding from your vagina. You may also have burning or pain when you pee (urinate).   Home care  Your health care provider may advise one or more of these as treatment:   Vaginal creams, lotions, and lubricants. These products help relieve vaginal dryness. They don’t need a prescription. They can be found in the personal care section of most pharmacies. Creams and lotions are used daily to help keep the vagina moist. Lubricants help reduce dryness and pain during sex. Choose water-based lubricants. Don’t use petroleum jelly, mineral oil, or other oils. These increase the chance of infection.  Hormone therapy (HT). HT increases the amount of estrogen in your body. This can help manage or relieve symptoms. HT can be given in pill form. It may be given as a lotion, cream, ring put into the vagina, or a patch on the skin. The risks and benefits of HT vary for each person. For instance, your risk may be higher if you've had breast cancer. Discuss this treatment with your provider. Not everyone can use HT.  You don’t need to stop having sex. In fact, regular sex can help keep vaginal tissues healthy. Take steps to make sex more comfortable by using water-based lubricants.   Preventing infections  Atrophic vaginitis makes an infection of the vagina or the urinary tract more likely. To help reduce your risk:   Keep your genitals clean. When you bathe, wash the outside of your vagina with mild soap and water. Clean gently between the  folds of your vagina.  Wipe from front to back after a bowel movement.  Don’t douche unless your health care provider tells you to.  Don't use scented toilet paper, scented vaginal sprays, or scented tampons.  Don't wear clothes that are tight in the crotch. These include pantyhose, jeans, and leggings. Wear cotton underwear. Change it every day.  Follow-up care  Follow up with your health care provider, or as advised.   When to get medical advice  Contact your health care provider right away if any of these occur:   Fever of 100.4°F (38°C) or higher, or as advised by your provider  Symptoms don’t go away or get worse even with treatment  Vaginal area swells or becomes painful  Vaginal area bleeds, but not because of your period  Bad-smelling discharge from the vagina  Pain or burning feeling when you pee, or you have trouble peeing  Open sores develop around vagina   [facility] last reviewed this educational content on 2/1/2025  This information is for informational purposes only. This is not intended to be a substitute for professional medical advice, diagnosis, or treatment. Always seek the advice and follow the directions from your physician or other qualified health care provider.  © 5062-3893 [employer]. All rights reserved. This information is not intended as a substitute for professional medical care. Always follow your healthcare professional's instructions.

## 2025-07-21 NOTE — PROGRESS NOTES
[facility]  Obstetrics and Gynecology  Follow Up Progress Note    Subjective:     Geeta Corcoran is a 44 year old  female who was last seen in office 2024 for annual exam and presents today with c/o recurrent UTI symptoms. The patient reports for past 1-2 years with onset of perimenopause she will note irritation with urination, mainly after sexual intercourse. Was seen in ER 2024 and diagnosed with UTI by UA but urine culture was clean. She does note improvement in symptoms with increased PO hydration. She is interested in possible HRT to address recurrent UTI symptoms. She does also report decreased libido and would possibly be interested in treatment for this if possible.     Review of Systems:  General: no complaints per category. See HPI for additional information.   Breast: no complaints per category. See HPI for additional information.   Respiratory: no complaints per category. See HPI for additional information.   Cardiovascular: no complaints per category. See HPI for additional information.   GI: no complaints per category. See HPI for additional information.   : no complaints per category. See HPI for additional information.   Heme: no complaints per category. See HPI for additional information.     OB History    Para Term  AB Living   2 2 2 0 0 2   SAB IAB Ectopic Multiple Live Births   0 0 0  2      # Outcome Date GA Lbr Andrea/2nd Weight Sex Type Anes PTL Lv   2 Term 18 38w1d 03:39 / 00:49 6 lb 7.7 oz (2.94 kg) F NORMAL SPONT EPI N BENOIT      Complications: Group B beta strep +   1 Term  38w0d  6 lb (2.722 kg) F Vag-Spont EPI N BENOIT         Gyne History:  Menarche: 10  Period Cycle (Days): menopause  Use of Birth Control (if yes, specify type): Tubal Ligation  Hx Prior Abnormal Pap: No  Pap Date: 21  Pap Result Notes: 21 WNL  Follow Up Recommendation: due  No LMP recorded. (Menstrual status: Tubal Ligation).      Meds:  Medications Ordered Prior  to Encounter[1]    All:  Allergies[2]    PMH:  Past Medical History[3]    PSH:  Past Surgical History[4]      Objective:     Vitals:    25 1304   BP: 108/82   Pulse: 82   Weight: 115 lb (52.2 kg)   Height: 63\"         Body mass index is 20.37 kg/m².    General: AAO.NAD.   CVS exam: normal peripheral perfusion  Chest: non-labored breathing, no tachypnea   Breast: deferred  Abdominal exam: soft, nontender, nondistended  Pelvic exam: deferred  Ext: non-tender, no edema    Labs:  2024:  (H), LH 48.9, estradiol <11.8 (Low), testosterone <3 (low)    Imaging:   Pelvic US 01/10/2025  PROCEDURE:  US PELVIS W EV (CPT=76856,16203)     COMPARISON:  None.     INDICATIONS:  N92.4 Abnormal perimenopausal bleeding     TECHNIQUE:  Pelvic ultrasound using transabdominal and endovaginal technique.  Transvaginal ultrasound was used to better evaluate adnexal and endometrial detail.     PATIENT STATED HISTORY: (As transcribed by Technologist)  Patient states irregular periods.           FINDINGS:                TRANSABDOMINAL ULTRASOUND:  UTERUS: The uterus measures 5.9 x 2.7 x 4.3 cm. The endometrium appears unremarkable on transabdominal images.  Nabothian cysts.       No significant free pelvic fluid.     TRANSVAGINAL ULTRASOUND:  UTERUS:. Endometrial stripe measures:  3 mm in diameter     OVARIES:  RIGHT OVARY: Right ovary measures 2.7 x 1.0 x 1.2 cm.     LEFT OVARY: Left ovary is visualized and measures 2.8 x 1.8 x 1.3 cm.     VASCULARITY: Normal flow is documented with color Doppler imaging.      Impression   CONCLUSION:  No acute findings.     LOCATION:  [facility]     Dictated by (CST): Tomy Castañeda MD on 1/10/2025 at 6:23 PM      Finalized by (CST): Tomy Castañeda MD on 1/10/2025 at 6:25 PM       Assessment:     Geeta Corcoran is a 44 year old  female who presents for atrophic vaginitis and recurrent UTI symptoms.        Plan:     Problem List Items Addressed This Visit    None  Visit Diagnoses          Atrophic vaginitis    -  Primary    Relevant Medications    conjugated estrogens (PREMARIN) 0.625 MG/GM Vaginal Cream              Recurrent UTI vs Atrophic Vaginitis  - pt with symptoms of perimenopause, now reports no period for <12 months  - lab work c/w menopause  - Rx for topical estrogen cream (Premarin) sent to patient' pharmacy for symptomatic relief    All of the findings and plan were discussed with the patient.  She notes understanding and agrees with the plan of care.  All questions were answered to the best of my ability at this time.      Total patient time was 15 minutes in evaluation, consultation, and coordination of care. This included face to face and non-face to face actions. The patient's questions and concerns were addressed.       RTC in 1 year for annual exam or sooner if needed     Mandi Hess DO   [facility]       Discussed with patient that there will not be further notification of normal or benign results other than receiving results on Momperyt. A [employer]ae message or telephone call will be placed by the physician and/or office staff if results are abnormal.     Note to patient and family   The 21st Century Cures Act makes medical notes available to patients in the interest of transparency.  However, please be advised that this is a medical document.  It is intended as mggh-ne-madh communication.  It is written and medical language may contain abbreviations or verbiage that are technical and unfamiliar.  It may appear blunt or direct.  Medical documents are intended to carry relevant information, facts as evident, and the clinical opinion of the practitioner.        This note could include assistance by Dragon voice recognition. Errors in content may be related to improper recognition by the system; efforts to review and correct have been done but errors may still exist.          [1]   Current Outpatient Medications on File Prior to Visit   Medication Sig Dispense Refill     VYVANSE 60 MG Oral Cap       propranolol 10 MG Oral Tab Take 1 tablet (10 mg total) by mouth 2 (two) times daily.      cetirizine 10 MG Oral Tab Take 1 tablet (10 mg total) by mouth daily.      amphetamine-dextroamphetamine 20 MG Oral Tab Take 0.5 tablets by mouth 2 (two) times daily.      VYVANSE 50 MG Oral Cap Take 1 capsule (50 mg total) by mouth every morning.      Melatonin 10 MG Oral Cap Take 1 capsule by mouth every evening.       No current facility-administered medications on file prior to visit.   [2] No Known Allergies  [3]   Past Medical History:   Abnormal uterine bleeding    Anxiety    Arthritis    Back pain    Body piercing    Decorative tattoo    Diverticulitis   [4]   Past Surgical History:  Procedure Laterality Date    Cholecystectomy      Colonoscopy      Laparoscopic cholecystectomy  07/18/2018    Dr Barnhart    Laparoscopy,remove adnexa  03/31/2021    bilateral salpingectomy

## (undated) DEVICE — CAUTERY CORD DISP E0503

## (undated) DEVICE — BANDAID CURAD 3IN X 1IN

## (undated) DEVICE — REMOVER LOT 4OZ NONIRRITATING UNSCNT SFT

## (undated) DEVICE — NITINOL WIRE STR 035

## (undated) DEVICE — REMOVER PREP SOLUTION 4OZ

## (undated) DEVICE — STERILE POLYISOPRENE POWDER-FREE SURGICAL GLOVES: Brand: PROTEXIS

## (undated) DEVICE — NEEDLE SPINAL 22X3-1/2 BLK

## (undated) DEVICE — LIGASURE LAP MARYLAND 37CM

## (undated) DEVICE — INSUFFLATION NEEDLE TO ESTABLISH PNEUMOPERITONEUM.: Brand: INSUFFLATION NEEDLE

## (undated) DEVICE — GLOVE SURG SENSICARE SZ 7

## (undated) DEVICE — LAP CHOLE/APPY CDS-LF: Brand: MEDLINE INDUSTRIES, INC.

## (undated) DEVICE — SUTURE VICRYL 2-0 UR-6

## (undated) DEVICE — UNDYED BRAIDED (POLYGLACTIN 910), SYNTHETIC ABSORBABLE SUTURE: Brand: COATED VICRYL

## (undated) DEVICE — GLOVE SURG SENSICARE SZ 7-1/2

## (undated) DEVICE — NEEDLE SPNL 22GA L5IN BLK HUB QNCKE BVL DISP

## (undated) DEVICE — SUTURE VICRYL 5-0 P-3

## (undated) DEVICE — SOL  .9 1000ML BTL

## (undated) DEVICE — DERMABOND LIQUID ADHESIVE

## (undated) DEVICE — GLOVE BIOGEL M SURG SZ 6-1/2

## (undated) DEVICE — GYN LAP CDS: Brand: MEDLINE INDUSTRIES, INC.

## (undated) DEVICE — PAIN TRAY: Brand: MEDLINE INDUSTRIES, INC.

## (undated) DEVICE — CHLORAPREP 26ML APPLICATOR

## (undated) DEVICE — BANDAGE ADH W1INXL3IN NAT FAB WVN N ADH PD

## (undated) DEVICE — KENDALL SCD EXPRESS SLEEVES, KNEE LENGTH, MEDIUM: Brand: KENDALL SCD

## (undated) DEVICE — BANDAID COVERLET 1X3

## (undated) DEVICE — SUTURE VICRYL 0

## (undated) DEVICE — 40580 - THE PINK PAD - ADVANCED TRENDELENBURG POSITIONING KIT: Brand: 40580 - THE PINK PAD - ADVANCED TRENDELENBURG POSITIONING KIT

## (undated) DEVICE — TIGERTAIL 5F FLXTIP 70CM

## (undated) DEVICE — Device

## (undated) DEVICE — DISPOSABLE LAPAROSCOPIC CLIP APPLIER WITH 20 CLIPS.: Brand: EPIX® UNIVERSAL CLIP APPLIER

## (undated) DEVICE — #11 STERILE BLADE: Brand: POLYMER COATED BLADES

## (undated) DEVICE — [HIGH FLOW INSUFFLATOR,  DO NOT USE IF PACKAGE IS DAMAGED,  KEEP DRY,  KEEP AWAY FROM SUNLIGHT,  PROTECT FROM HEAT AND RADIOACTIVE SOURCES.]: Brand: PNEUMOSURE

## (undated) DEVICE — GLOVE,SURG,SENSICARE,ALOE,LF,PF,7: Brand: MEDLINE

## (undated) DEVICE — MARKER SKIN 2 TIP

## (undated) DEVICE — SUTURE VICRYL 0 UR-6

## (undated) DEVICE — PLUMEPORT ACTIV LAPAROSCOPIC SMOKE FILTRATION DEVICE: Brand: PLUMEPORT ACTIVE

## (undated) DEVICE — TROCAR: Brand: KII SHIELDED BLADED ACCESS SYSTEM

## (undated) DEVICE — GLV SURG SZ 7.5 THK10MIL WHT ISOLEX SYN

## (undated) DEVICE — SYRINGE 30ML LL TIP

## (undated) DEVICE — SKIN REG/FINE DUAL MARKER, RULER, LABELS: Brand: MEDLINE

## (undated) DEVICE — NEEDLE SPNL 22GA L3.5IN BLK HUB SS RW FIT

## (undated) DEVICE — SKIN MARKER DUAL TIP WITH RULER CAP AND LABELS: Brand: DEVON

## (undated) DEVICE — TROCAR: Brand: KII FIOS FIRST ENTRY

## (undated) DEVICE — GLOVE SURG SENSICARE SZ 6-1/2

## (undated) DEVICE — TROCAR: Brand: KII® SLEEVE

## (undated) NOTE — LETTER
18    Re: Joel Luis  : 1980    To Whom It May Concern:  Joel Luis is under my care for a medical condition. Please excuse her from school/work on 2018.   If you have any questions concerning this letter, jaymie

## (undated) NOTE — MR AVS SNAPSHOT
After Visit Summary   9/17/2021    Titus Uriarte   MRN: [id number]           Visit Information     Date & Time  9/17/2021 10:30 AM Provider  Ivone Qureshi MD Department  [facility], [facility], [address] [address] [facility] Dept.  Phone  [phone] Mackenzie Estrada [[id number] CUSTOM]  9/17/2021 9/17/2022      Follow-up Instructions    Return in about 1 year (around 9/17/2022).      Imaging Scheduling Instructions     Around September 17, 2021   Imaging:   [facility] HALIE 2D+3D SCREENING BILAT (ZLH=12917/78175)    Instr Providers  Treatment for mild illness or injury that does not require immediate attention.  Average cost  $70*   [facility]  Monday – Friday  8:00 am – 8:00 pm   Saturday – Sunday  8:00 am – 4:00 pm    WALK-IN [facility]ARE  Pr

## (undated) NOTE — LETTER
Patient Name: Anushka Robison  YOB: 1980          MRN number:  [id number]  Date:  5/31/2019  Referring Physician:  Daria Ramos             Dx: R Lumbar radiculopathy         Authorized # of Visits:  [id number]         Next MD visit: none scheduled  Fall

## (undated) NOTE — MR AVS SNAPSHOT
After Visit Summary   2017    Ariadne Vargas    MRN: [id number]           Visit Information     Date & Time  2017  9:00 AM Provider  [address]  Dept[address]  Phone  [phone]      Your Vitals Were Expires: 11/6/2017  9:35 AM    4. Enter your Zip Code and Date of Birth (mm/dd/yyyy) as indicated and click Next. You will be taken to the next sign-up page. 5. Create a Salesvuet Username.  This will be your Salesvuet login Username and cannot be changed, so not require immediate attention. Average cost  $70*       VIDEO VISITS  Visit face-to-face with a [facility] physician   using your mobile device or computer   using [employer]ar      e-VISITS  Communicate with a [facility] physician   online.   The physician will re

## (undated) NOTE — LETTER
18    Re: Ashely Howard  : 1980    To Whom It May Concern:  Ashely Howard is under my care for ***.   Please excuse her from school/work ***  If you have any questions concerning this letter, please feel free to contact my of

## (undated) NOTE — LETTER
18    Re: Vianey Rolle  : 1980    To Whom It May Concern:  Vianey Rolle is a patient under my care and was seen in our office for an appointment on 2018.   If you have any questions concerning this letter, please fe

## (undated) NOTE — LETTER
18        RE:  Scott Paul  :  1980      To Whom It May Concern:    Scott Paul  is under my care for pregnancy with an estimated delivery date of 18.   She was seen in the office today for monitoring and OB exam.  S

## (undated) NOTE — MR AVS SNAPSHOT
[facility] [address], [facility] [address] [facility]  [phone]               Thank you for choosing us for your health care visit with Alejandro Hudson MD.  We are glad to serve you and happy to provide you with this

## (undated) NOTE — LETTER
01/29/21        [address] [address]      Dear Adán Ferrera,    [address] records indicate that you have outstanding lab work and or testing that was ordered for you and has not yet been completed:  Orders Placed This Encounter

## (undated) NOTE — Clinical Note
IMPRESSION: IUP at 20w3d AMA: low-risk cell free fetal DNA, declined invasive testing  RECOMMENDATIONS: Continue care with Dr. Isac Herrera ultrasound at 32 weeks. Weekly NST's at 36 weeks.

## (undated) NOTE — MR AVS SNAPSHOT
[address] [facility] [address] [phone]               Thank you for choosing us for your health care visit with Gloria Delgado MD.  We are glad to serve you and happy to provide you with this summary of your vis Follow-up Instructions     Return in about 4 weeks (around 7/6/2017).          Reason for Today's Visit     Prenatal Care           Medical Issues Discussed Today     AMA (advanced maternal age) multigravida 35+      Instructions and Information about Your

## (undated) NOTE — LETTER
[facility]  Janet [address] [facility], [address] [place] [address]    Consent for Operation    Date: __________________    Time: _______________    1.  I authorize the performance upon [employer] the following operation:    Procedure(s):  LAPAROSCOPIC procedure has been videotaped, the surgeon will obtain the original videotape. The hospital will not be responsible for storage or maintenance of this tape.     6. For the purpose of advancing medical education, I consent to the admittance of observers to t STATEMENTS REQUIRING INSERTION OR COMPLETION WERE FILLED IN.     Signature of Patient:   ___________________________    When the patient is a minor or mentally incompetent to give consent:  Signature of person authorized to consent for patient: ____________ supplements, and pills I can buy without a prescription (including street drugs/illegal medications). Failure to inform my anesthesiologist about these medicines may increase my risk of anesthetic complications.   · If I am allergic to anything or have had Anesthesiologist Signature     Date   Time  I have discussed the procedure and information above with the patient (or patient’s representative) and answered their questions. The patient or their representative has agreed to have anesthesia services.     ___